# Patient Record
Sex: MALE | Race: WHITE | NOT HISPANIC OR LATINO | Employment: UNEMPLOYED | RURAL
[De-identification: names, ages, dates, MRNs, and addresses within clinical notes are randomized per-mention and may not be internally consistent; named-entity substitution may affect disease eponyms.]

---

## 2018-12-04 ENCOUNTER — HISTORICAL (OUTPATIENT)
Dept: ADMINISTRATIVE | Facility: HOSPITAL | Age: 58
End: 2018-12-04

## 2018-12-06 LAB
LAB AP CLINICAL INFORMATION: NORMAL
LAB AP COMMENTS: NORMAL
LAB AP DIAGNOSIS - HISTORICAL: NORMAL
LAB AP GROSS PATHOLOGY - HISTORICAL: NORMAL
LAB AP SPECIMEN SUBMITTED - HISTORICAL: NORMAL

## 2021-04-30 DIAGNOSIS — Z12.11 COLON CANCER SCREENING: Primary | ICD-10-CM

## 2021-05-17 ENCOUNTER — ANESTHESIA EVENT (OUTPATIENT)
Dept: GASTROENTEROLOGY | Facility: HOSPITAL | Age: 61
End: 2021-05-17
Payer: COMMERCIAL

## 2021-05-17 ENCOUNTER — ANESTHESIA (OUTPATIENT)
Dept: GASTROENTEROLOGY | Facility: HOSPITAL | Age: 61
End: 2021-05-17
Payer: COMMERCIAL

## 2021-05-17 ENCOUNTER — HOSPITAL ENCOUNTER (OUTPATIENT)
Dept: GASTROENTEROLOGY | Facility: HOSPITAL | Age: 61
Discharge: HOME | End: 2021-05-17
Attending: INTERNAL MEDICINE
Payer: COMMERCIAL

## 2021-05-17 VITALS
SYSTOLIC BLOOD PRESSURE: 142 MMHG | RESPIRATION RATE: 14 BRPM | TEMPERATURE: 98 F | DIASTOLIC BLOOD PRESSURE: 78 MMHG | OXYGEN SATURATION: 96 % | HEART RATE: 94 BPM

## 2021-05-17 DIAGNOSIS — D12.4 ADENOMATOUS POLYP OF DESCENDING COLON: ICD-10-CM

## 2021-05-17 DIAGNOSIS — K57.30 DIVERTICULA, COLON: ICD-10-CM

## 2021-05-17 DIAGNOSIS — Z12.11 SCREENING FOR MALIGNANT NEOPLASM OF COLON: ICD-10-CM

## 2021-05-17 DIAGNOSIS — Z12.11 COLON CANCER SCREENING: ICD-10-CM

## 2021-05-17 PROCEDURE — 27201423 OPTIME MED/SURG SUP & DEVICES STERILE SUPPLY

## 2021-05-17 PROCEDURE — D9220A PRA ANESTHESIA: ICD-10-PCS | Mod: PT,,, | Performed by: NURSE ANESTHETIST, CERTIFIED REGISTERED

## 2021-05-17 PROCEDURE — D9220A PRA ANESTHESIA: Mod: PT,,, | Performed by: NURSE ANESTHETIST, CERTIFIED REGISTERED

## 2021-05-17 PROCEDURE — 88305 TISSUE EXAM BY PATHOLOGIST: CPT | Mod: 26,,, | Performed by: PATHOLOGY

## 2021-05-17 PROCEDURE — 63600175 PHARM REV CODE 636 W HCPCS: Performed by: NURSE ANESTHETIST, CERTIFIED REGISTERED

## 2021-05-17 PROCEDURE — 45385 COLONOSCOPY W/LESION REMOVAL: CPT | Mod: PT

## 2021-05-17 PROCEDURE — 37000008 HC ANESTHESIA 1ST 15 MINUTES

## 2021-05-17 PROCEDURE — 25000003 PHARM REV CODE 250: Performed by: NURSE ANESTHETIST, CERTIFIED REGISTERED

## 2021-05-17 PROCEDURE — 88305 TISSUE EXAM BY PATHOLOGIST: CPT | Mod: SUR | Performed by: INTERNAL MEDICINE

## 2021-05-17 PROCEDURE — C1889 IMPLANT/INSERT DEVICE, NOC: HCPCS

## 2021-05-17 PROCEDURE — 88305 SURGICAL PATHOLOGY: ICD-10-PCS | Mod: 26,,, | Performed by: PATHOLOGY

## 2021-05-17 RX ORDER — LIDOCAINE HYDROCHLORIDE 20 MG/ML
INJECTION, SOLUTION EPIDURAL; INFILTRATION; INTRACAUDAL; PERINEURAL
Status: DISCONTINUED | OUTPATIENT
Start: 2021-05-17 | End: 2021-05-17

## 2021-05-17 RX ORDER — SODIUM CHLORIDE 0.9 % (FLUSH) 0.9 %
10 SYRINGE (ML) INJECTION
Status: DISCONTINUED | OUTPATIENT
Start: 2021-05-17 | End: 2021-05-18 | Stop reason: HOSPADM

## 2021-05-17 RX ORDER — PROPOFOL 10 MG/ML
INJECTION, EMULSION INTRAVENOUS
Status: DISCONTINUED | OUTPATIENT
Start: 2021-05-17 | End: 2021-05-17

## 2021-05-17 RX ORDER — ATORVASTATIN CALCIUM 10 MG/1
10 TABLET, FILM COATED ORAL DAILY
COMMUNITY
End: 2021-12-03

## 2021-05-17 RX ADMIN — PROPOFOL 30 MG: 10 INJECTION, EMULSION INTRAVENOUS at 02:05

## 2021-05-17 RX ADMIN — LIDOCAINE HYDROCHLORIDE 100 MG: 20 INJECTION, SOLUTION INTRAVENOUS at 02:05

## 2021-05-17 RX ADMIN — PROPOFOL 70 MG: 10 INJECTION, EMULSION INTRAVENOUS at 02:05

## 2021-05-17 RX ADMIN — SODIUM CHLORIDE: 9 INJECTION, SOLUTION INTRAVENOUS at 02:05

## 2021-05-17 RX ADMIN — PROPOFOL 40 MG: 10 INJECTION, EMULSION INTRAVENOUS at 02:05

## 2021-05-18 LAB
ESTROGEN SERPL-MCNC: NORMAL PG/ML
LAB AP GROSS DESCRIPTION: NORMAL
LAB AP LABORATORY NOTES: NORMAL
T3RU NFR SERPL: NORMAL %

## 2021-05-19 ENCOUNTER — TELEPHONE (OUTPATIENT)
Dept: GASTROENTEROLOGY | Facility: CLINIC | Age: 61
End: 2021-05-19

## 2021-06-10 ENCOUNTER — OFFICE VISIT (OUTPATIENT)
Dept: FAMILY MEDICINE | Facility: CLINIC | Age: 61
End: 2021-06-10
Payer: COMMERCIAL

## 2021-06-10 DIAGNOSIS — I10 HYPERTENSION, UNSPECIFIED TYPE: Primary | ICD-10-CM

## 2021-06-10 DIAGNOSIS — F41.9 ANXIETY: ICD-10-CM

## 2021-06-10 DIAGNOSIS — K21.9 GASTROESOPHAGEAL REFLUX DISEASE, UNSPECIFIED WHETHER ESOPHAGITIS PRESENT: ICD-10-CM

## 2021-06-10 DIAGNOSIS — R52 PAIN: ICD-10-CM

## 2021-06-10 DIAGNOSIS — E78.5 HYPERLIPIDEMIA, UNSPECIFIED HYPERLIPIDEMIA TYPE: ICD-10-CM

## 2021-06-10 LAB
ALBUMIN SERPL BCP-MCNC: 3.6 G/DL (ref 3.5–5)
ALBUMIN/GLOB SERPL: 0.8 {RATIO}
ALP SERPL-CCNC: 126 U/L (ref 45–115)
ALT SERPL W P-5'-P-CCNC: 55 U/L (ref 16–61)
ANION GAP SERPL CALCULATED.3IONS-SCNC: 9 MMOL/L (ref 7–16)
AST SERPL W P-5'-P-CCNC: 27 U/L (ref 15–37)
BACTERIA #/AREA URNS HPF: ABNORMAL /HPF
BILIRUB SERPL-MCNC: 0.2 MG/DL (ref 0–1.2)
BILIRUB UR QL STRIP: NEGATIVE
BUN SERPL-MCNC: 9 MG/DL (ref 7–18)
BUN/CREAT SERPL: 9 (ref 6–20)
CALCIUM SERPL-MCNC: 8.7 MG/DL (ref 8.5–10.1)
CHLORIDE SERPL-SCNC: 106 MMOL/L (ref 98–107)
CHOLEST SERPL-MCNC: 169 MG/DL (ref 0–200)
CHOLEST/HDLC SERPL: 4.6 {RATIO}
CLARITY UR: CLEAR
CO2 SERPL-SCNC: 27 MMOL/L (ref 21–32)
COLOR UR: YELLOW
CREAT SERPL-MCNC: 1.03 MG/DL (ref 0.7–1.3)
CREAT UR-MCNC: 169 MG/DL (ref 39–259)
GLOBULIN SER-MCNC: 4.5 G/DL (ref 2–4)
GLUCOSE SERPL-MCNC: 91 MG/DL (ref 74–106)
GLUCOSE UR STRIP-MCNC: NEGATIVE MG/DL
HDLC SERPL-MCNC: 37 MG/DL (ref 40–60)
HYALINE CASTS #/AREA URNS LPF: ABNORMAL /LPF
KETONES UR STRIP-SCNC: NEGATIVE MG/DL
LDLC SERPL CALC-MCNC: 96 MG/DL
LDLC/HDLC SERPL: 2.6 {RATIO}
LEUKOCYTE ESTERASE UR QL STRIP: ABNORMAL
MICROALBUMIN UR-MCNC: 2 MG/DL (ref 0–2.8)
MICROALBUMIN/CREAT RATIO PNL UR: 11.8 MG/G (ref 0–30)
MUCOUS THREADS #/AREA URNS HPF: ABNORMAL /HPF
NITRITE UR QL STRIP: NEGATIVE
NONHDLC SERPL-MCNC: 132 MG/DL
PH UR STRIP: 6 PH UNITS
POTASSIUM SERPL-SCNC: 4 MMOL/L (ref 3.5–5.1)
PROT SERPL-MCNC: 8.1 G/DL (ref 6.4–8.2)
PROT UR QL STRIP: NEGATIVE
RBC # UR STRIP: NEGATIVE /UL
RBC #/AREA URNS HPF: ABNORMAL /HPF
SODIUM SERPL-SCNC: 138 MMOL/L (ref 136–145)
SP GR UR STRIP: 1.01
TRIGL SERPL-MCNC: 182 MG/DL (ref 35–150)
UROBILINOGEN UR STRIP-ACNC: 0.2 MG/DL
VLDLC SERPL-MCNC: 36 MG/DL
WBC #/AREA URNS HPF: ABNORMAL /HPF

## 2021-06-10 PROCEDURE — 82043 MICROALBUMIN / CREATININE RATIO URINE: ICD-10-PCS | Mod: ,,, | Performed by: CLINICAL MEDICAL LABORATORY

## 2021-06-10 PROCEDURE — 82570 MICROALBUMIN / CREATININE RATIO URINE: ICD-10-PCS | Mod: ,,, | Performed by: CLINICAL MEDICAL LABORATORY

## 2021-06-10 PROCEDURE — 82043 UR ALBUMIN QUANTITATIVE: CPT | Mod: ,,, | Performed by: CLINICAL MEDICAL LABORATORY

## 2021-06-10 PROCEDURE — 99214 OFFICE O/P EST MOD 30 MIN: CPT | Mod: ,,, | Performed by: NURSE PRACTITIONER

## 2021-06-10 PROCEDURE — 81001 URINALYSIS AUTO W/SCOPE: CPT | Mod: ,,, | Performed by: CLINICAL MEDICAL LABORATORY

## 2021-06-10 PROCEDURE — 80061 LIPID PANEL: ICD-10-PCS | Mod: ,,, | Performed by: CLINICAL MEDICAL LABORATORY

## 2021-06-10 PROCEDURE — 80061 LIPID PANEL: CPT | Mod: ,,, | Performed by: CLINICAL MEDICAL LABORATORY

## 2021-06-10 PROCEDURE — 82570 ASSAY OF URINE CREATININE: CPT | Mod: ,,, | Performed by: CLINICAL MEDICAL LABORATORY

## 2021-06-10 PROCEDURE — 99214 PR OFFICE/OUTPT VISIT, EST, LEVL IV, 30-39 MIN: ICD-10-PCS | Mod: ,,, | Performed by: NURSE PRACTITIONER

## 2021-06-10 PROCEDURE — 80053 COMPREHEN METABOLIC PANEL: CPT | Mod: ,,, | Performed by: CLINICAL MEDICAL LABORATORY

## 2021-06-10 PROCEDURE — 81001 URINALYSIS, REFLEX TO URINE CULTURE: ICD-10-PCS | Mod: ,,, | Performed by: CLINICAL MEDICAL LABORATORY

## 2021-06-10 PROCEDURE — 80053 COMPREHENSIVE METABOLIC PANEL: ICD-10-PCS | Mod: ,,, | Performed by: CLINICAL MEDICAL LABORATORY

## 2021-06-10 RX ORDER — NAPROXEN 500 MG/1
500 TABLET ORAL 2 TIMES DAILY
Qty: 90 TABLET | Refills: 1 | Status: SHIPPED | OUTPATIENT
Start: 2021-06-10 | End: 2021-12-15

## 2021-06-10 RX ORDER — OMEPRAZOLE 20 MG/1
20 CAPSULE, DELAYED RELEASE ORAL DAILY
Qty: 90 CAPSULE | Refills: 1 | Status: SHIPPED | OUTPATIENT
Start: 2021-06-10 | End: 2021-12-03 | Stop reason: SDUPTHER

## 2021-06-10 RX ORDER — BUSPIRONE HYDROCHLORIDE 10 MG/1
1 TABLET ORAL 2 TIMES DAILY
COMMUNITY
Start: 2021-03-10 | End: 2021-06-10 | Stop reason: SDUPTHER

## 2021-06-10 RX ORDER — ATORVASTATIN CALCIUM 40 MG/1
40 TABLET, FILM COATED ORAL DAILY
Qty: 90 TABLET | Refills: 1 | Status: SHIPPED | OUTPATIENT
Start: 2021-06-10 | End: 2021-12-03 | Stop reason: SDUPTHER

## 2021-06-10 RX ORDER — ATORVASTATIN CALCIUM 40 MG/1
1 TABLET, FILM COATED ORAL DAILY
COMMUNITY
Start: 2021-03-10 | End: 2021-06-10 | Stop reason: SDUPTHER

## 2021-06-10 RX ORDER — NAPROXEN 500 MG/1
1 TABLET ORAL 2 TIMES DAILY
COMMUNITY
Start: 2021-03-10 | End: 2021-06-10 | Stop reason: SDUPTHER

## 2021-06-10 RX ORDER — LISINOPRIL 10 MG/1
10 TABLET ORAL DAILY
Qty: 90 TABLET | Refills: 1 | Status: SHIPPED | OUTPATIENT
Start: 2021-06-10 | End: 2021-12-03 | Stop reason: SDUPTHER

## 2021-06-10 RX ORDER — LISINOPRIL 10 MG/1
1 TABLET ORAL DAILY
COMMUNITY
Start: 2021-03-10 | End: 2021-06-10 | Stop reason: SDUPTHER

## 2021-06-10 RX ORDER — BUSPIRONE HYDROCHLORIDE 10 MG/1
10 TABLET ORAL 2 TIMES DAILY
Qty: 180 TABLET | Refills: 1 | Status: SHIPPED | OUTPATIENT
Start: 2021-06-10 | End: 2021-12-03 | Stop reason: SDUPTHER

## 2021-06-10 RX ORDER — OMEPRAZOLE 20 MG/1
1 CAPSULE, DELAYED RELEASE ORAL DAILY
COMMUNITY
Start: 2021-03-10 | End: 2021-06-10 | Stop reason: SDUPTHER

## 2021-12-03 ENCOUNTER — OFFICE VISIT (OUTPATIENT)
Dept: FAMILY MEDICINE | Facility: CLINIC | Age: 61
End: 2021-12-03
Payer: COMMERCIAL

## 2021-12-03 VITALS
WEIGHT: 156 LBS | HEART RATE: 79 BPM | DIASTOLIC BLOOD PRESSURE: 80 MMHG | HEIGHT: 65 IN | RESPIRATION RATE: 18 BRPM | BODY MASS INDEX: 25.99 KG/M2 | TEMPERATURE: 99 F | OXYGEN SATURATION: 98 % | SYSTOLIC BLOOD PRESSURE: 130 MMHG

## 2021-12-03 DIAGNOSIS — E78.5 HYPERLIPIDEMIA, UNSPECIFIED HYPERLIPIDEMIA TYPE: ICD-10-CM

## 2021-12-03 DIAGNOSIS — I10 HYPERTENSION, UNSPECIFIED TYPE: ICD-10-CM

## 2021-12-03 DIAGNOSIS — F41.9 ANXIETY: ICD-10-CM

## 2021-12-03 DIAGNOSIS — K21.9 GASTROESOPHAGEAL REFLUX DISEASE, UNSPECIFIED WHETHER ESOPHAGITIS PRESENT: ICD-10-CM

## 2021-12-03 LAB
ALBUMIN SERPL BCP-MCNC: 3.4 G/DL (ref 3.5–5)
ALBUMIN/GLOB SERPL: 0.7 {RATIO}
ALP SERPL-CCNC: 144 U/L (ref 45–115)
ALT SERPL W P-5'-P-CCNC: 51 U/L (ref 16–61)
ANION GAP SERPL CALCULATED.3IONS-SCNC: 6 MMOL/L (ref 7–16)
AST SERPL W P-5'-P-CCNC: 25 U/L (ref 15–37)
BASOPHILS # BLD AUTO: 0.05 K/UL (ref 0–0.2)
BASOPHILS NFR BLD AUTO: 0.5 % (ref 0–1)
BILIRUB SERPL-MCNC: 0.3 MG/DL (ref 0–1.2)
BUN SERPL-MCNC: 9 MG/DL (ref 7–18)
BUN/CREAT SERPL: 9 (ref 6–20)
CALCIUM SERPL-MCNC: 8.8 MG/DL (ref 8.5–10.1)
CHLORIDE SERPL-SCNC: 108 MMOL/L (ref 98–107)
CHOLEST SERPL-MCNC: 126 MG/DL (ref 0–200)
CHOLEST/HDLC SERPL: 3.3 {RATIO}
CO2 SERPL-SCNC: 29 MMOL/L (ref 21–32)
CREAT SERPL-MCNC: 0.95 MG/DL (ref 0.7–1.3)
DIFFERENTIAL METHOD BLD: ABNORMAL
EOSINOPHIL # BLD AUTO: 0.28 K/UL (ref 0–0.5)
EOSINOPHIL NFR BLD AUTO: 2.9 % (ref 1–4)
ERYTHROCYTE [DISTWIDTH] IN BLOOD BY AUTOMATED COUNT: 12.1 % (ref 11.5–14.5)
GLOBULIN SER-MCNC: 4.7 G/DL (ref 2–4)
GLUCOSE SERPL-MCNC: 88 MG/DL (ref 74–106)
HCT VFR BLD AUTO: 43 % (ref 40–54)
HDLC SERPL-MCNC: 38 MG/DL (ref 40–60)
HGB BLD-MCNC: 13.9 G/DL (ref 13.5–18)
IMM GRANULOCYTES # BLD AUTO: 0.02 K/UL (ref 0–0.04)
IMM GRANULOCYTES NFR BLD: 0.2 % (ref 0–0.4)
LDLC SERPL CALC-MCNC: 62 MG/DL
LDLC/HDLC SERPL: 1.6 {RATIO}
LYMPHOCYTES # BLD AUTO: 1.74 K/UL (ref 1–4.8)
LYMPHOCYTES NFR BLD AUTO: 18.3 % (ref 27–41)
MCH RBC QN AUTO: 26.8 PG (ref 27–31)
MCHC RBC AUTO-ENTMCNC: 32.3 G/DL (ref 32–36)
MCV RBC AUTO: 82.9 FL (ref 80–96)
MONOCYTES # BLD AUTO: 0.91 K/UL (ref 0–0.8)
MONOCYTES NFR BLD AUTO: 9.6 % (ref 2–6)
MPC BLD CALC-MCNC: 11.2 FL (ref 9.4–12.4)
NEUTROPHILS # BLD AUTO: 6.52 K/UL (ref 1.8–7.7)
NEUTROPHILS NFR BLD AUTO: 68.5 % (ref 53–65)
NONHDLC SERPL-MCNC: 88 MG/DL
NRBC # BLD AUTO: 0 X10E3/UL
NRBC, AUTO (.00): 0 %
PLATELET # BLD AUTO: 264 K/UL (ref 150–400)
POTASSIUM SERPL-SCNC: 3.6 MMOL/L (ref 3.5–5.1)
PROT SERPL-MCNC: 8.1 G/DL (ref 6.4–8.2)
RBC # BLD AUTO: 5.19 M/UL (ref 4.6–6.2)
SODIUM SERPL-SCNC: 139 MMOL/L (ref 136–145)
TRIGL SERPL-MCNC: 132 MG/DL (ref 35–150)
VLDLC SERPL-MCNC: 26 MG/DL
WBC # BLD AUTO: 9.52 K/UL (ref 4.5–11)

## 2021-12-03 PROCEDURE — 80053 COMPREHENSIVE METABOLIC PANEL: ICD-10-PCS | Mod: ,,, | Performed by: CLINICAL MEDICAL LABORATORY

## 2021-12-03 PROCEDURE — 4010F PR ACE/ARB THEARPY RXD/TAKEN: ICD-10-PCS | Mod: ,,, | Performed by: NURSE PRACTITIONER

## 2021-12-03 PROCEDURE — 80061 LIPID PANEL: CPT | Mod: ,,, | Performed by: CLINICAL MEDICAL LABORATORY

## 2021-12-03 PROCEDURE — 99214 PR OFFICE/OUTPT VISIT, EST, LEVL IV, 30-39 MIN: ICD-10-PCS | Mod: ,,, | Performed by: NURSE PRACTITIONER

## 2021-12-03 PROCEDURE — 3066F PR DOCUMENTATION OF TREATMENT FOR NEPHROPATHY: ICD-10-PCS | Mod: ,,, | Performed by: NURSE PRACTITIONER

## 2021-12-03 PROCEDURE — 3066F NEPHROPATHY DOC TX: CPT | Mod: ,,, | Performed by: NURSE PRACTITIONER

## 2021-12-03 PROCEDURE — 3061F NEG MICROALBUMINURIA REV: CPT | Mod: ,,, | Performed by: NURSE PRACTITIONER

## 2021-12-03 PROCEDURE — 80061 LIPID PANEL: ICD-10-PCS | Mod: ,,, | Performed by: CLINICAL MEDICAL LABORATORY

## 2021-12-03 PROCEDURE — 85025 CBC WITH DIFFERENTIAL: ICD-10-PCS | Mod: ,,, | Performed by: CLINICAL MEDICAL LABORATORY

## 2021-12-03 PROCEDURE — 99214 OFFICE O/P EST MOD 30 MIN: CPT | Mod: ,,, | Performed by: NURSE PRACTITIONER

## 2021-12-03 PROCEDURE — 3061F PR NEG MICROALBUMINURIA RESULT DOCUMENTED/REVIEW: ICD-10-PCS | Mod: ,,, | Performed by: NURSE PRACTITIONER

## 2021-12-03 PROCEDURE — 80053 COMPREHEN METABOLIC PANEL: CPT | Mod: ,,, | Performed by: CLINICAL MEDICAL LABORATORY

## 2021-12-03 PROCEDURE — 85025 COMPLETE CBC W/AUTO DIFF WBC: CPT | Mod: ,,, | Performed by: CLINICAL MEDICAL LABORATORY

## 2021-12-03 PROCEDURE — 4010F ACE/ARB THERAPY RXD/TAKEN: CPT | Mod: ,,, | Performed by: NURSE PRACTITIONER

## 2021-12-03 RX ORDER — OMEPRAZOLE 20 MG/1
20 CAPSULE, DELAYED RELEASE ORAL DAILY
Qty: 90 CAPSULE | Refills: 1 | Status: SHIPPED | OUTPATIENT
Start: 2021-12-03 | End: 2022-12-08 | Stop reason: SDUPTHER

## 2021-12-03 RX ORDER — ATORVASTATIN CALCIUM 40 MG/1
40 TABLET, FILM COATED ORAL DAILY
Qty: 90 TABLET | Refills: 1 | Status: SHIPPED | OUTPATIENT
Start: 2021-12-03 | End: 2022-06-02 | Stop reason: SDUPTHER

## 2021-12-03 RX ORDER — BUSPIRONE HYDROCHLORIDE 10 MG/1
10 TABLET ORAL 2 TIMES DAILY
Qty: 180 TABLET | Refills: 1 | Status: SHIPPED | OUTPATIENT
Start: 2021-12-03 | End: 2022-06-02 | Stop reason: SDUPTHER

## 2021-12-03 RX ORDER — LISINOPRIL 10 MG/1
10 TABLET ORAL DAILY
Qty: 90 TABLET | Refills: 1 | Status: SHIPPED | OUTPATIENT
Start: 2021-12-03 | End: 2022-06-02 | Stop reason: SDUPTHER

## 2021-12-15 ENCOUNTER — OFFICE VISIT (OUTPATIENT)
Dept: FAMILY MEDICINE | Facility: CLINIC | Age: 61
End: 2021-12-15
Payer: COMMERCIAL

## 2021-12-15 VITALS
RESPIRATION RATE: 16 BRPM | HEART RATE: 77 BPM | HEIGHT: 66 IN | WEIGHT: 157.19 LBS | OXYGEN SATURATION: 97 % | SYSTOLIC BLOOD PRESSURE: 150 MMHG | TEMPERATURE: 97 F | BODY MASS INDEX: 25.26 KG/M2 | DIASTOLIC BLOOD PRESSURE: 70 MMHG

## 2021-12-15 DIAGNOSIS — H65.01 RIGHT ACUTE SEROUS OTITIS MEDIA, RECURRENCE NOT SPECIFIED: Primary | ICD-10-CM

## 2021-12-15 PROCEDURE — 4010F ACE/ARB THERAPY RXD/TAKEN: CPT | Mod: ,,, | Performed by: NURSE PRACTITIONER

## 2021-12-15 PROCEDURE — 99213 OFFICE O/P EST LOW 20 MIN: CPT | Mod: ,,, | Performed by: NURSE PRACTITIONER

## 2021-12-15 PROCEDURE — 3066F PR DOCUMENTATION OF TREATMENT FOR NEPHROPATHY: ICD-10-PCS | Mod: ,,, | Performed by: NURSE PRACTITIONER

## 2021-12-15 PROCEDURE — 99213 PR OFFICE/OUTPT VISIT, EST, LEVL III, 20-29 MIN: ICD-10-PCS | Mod: ,,, | Performed by: NURSE PRACTITIONER

## 2021-12-15 PROCEDURE — 3066F NEPHROPATHY DOC TX: CPT | Mod: ,,, | Performed by: NURSE PRACTITIONER

## 2021-12-15 PROCEDURE — 3061F PR NEG MICROALBUMINURIA RESULT DOCUMENTED/REVIEW: ICD-10-PCS | Mod: ,,, | Performed by: NURSE PRACTITIONER

## 2021-12-15 PROCEDURE — 3061F NEG MICROALBUMINURIA REV: CPT | Mod: ,,, | Performed by: NURSE PRACTITIONER

## 2021-12-15 PROCEDURE — 4010F PR ACE/ARB THEARPY RXD/TAKEN: ICD-10-PCS | Mod: ,,, | Performed by: NURSE PRACTITIONER

## 2021-12-15 RX ORDER — CEFPROZIL 500 MG/1
TABLET, FILM COATED ORAL
COMMUNITY
Start: 2021-12-11 | End: 2023-01-06

## 2021-12-15 RX ORDER — CIPROFLOXACIN AND DEXAMETHASONE 3; 1 MG/ML; MG/ML
4 SUSPENSION/ DROPS AURICULAR (OTIC) 2 TIMES DAILY
Qty: 7.5 ML | Refills: 0 | Status: SHIPPED | OUTPATIENT
Start: 2021-12-15 | End: 2021-12-16

## 2021-12-21 ENCOUNTER — OFFICE VISIT (OUTPATIENT)
Dept: OTOLARYNGOLOGY | Facility: CLINIC | Age: 61
End: 2021-12-21
Payer: COMMERCIAL

## 2021-12-21 VITALS — WEIGHT: 157 LBS | BODY MASS INDEX: 25.23 KG/M2 | HEIGHT: 66 IN

## 2021-12-21 DIAGNOSIS — H60.313 DIFFUSE OTITIS EXTERNA OF BOTH EARS, UNSPECIFIED CHRONICITY: ICD-10-CM

## 2021-12-21 DIAGNOSIS — G51.0 FACIAL PARALYSIS: ICD-10-CM

## 2021-12-21 DIAGNOSIS — H92.01 OTALGIA OF RIGHT EAR: Primary | ICD-10-CM

## 2021-12-21 PROCEDURE — 4010F PR ACE/ARB THEARPY RXD/TAKEN: ICD-10-PCS | Mod: CPTII,,, | Performed by: OTOLARYNGOLOGY

## 2021-12-21 PROCEDURE — 3066F PR DOCUMENTATION OF TREATMENT FOR NEPHROPATHY: ICD-10-PCS | Mod: CPTII,,, | Performed by: OTOLARYNGOLOGY

## 2021-12-21 PROCEDURE — 99213 OFFICE O/P EST LOW 20 MIN: CPT | Mod: PBBFAC | Performed by: OTOLARYNGOLOGY

## 2021-12-21 PROCEDURE — 4010F ACE/ARB THERAPY RXD/TAKEN: CPT | Mod: CPTII,,, | Performed by: OTOLARYNGOLOGY

## 2021-12-21 PROCEDURE — 99204 PR OFFICE/OUTPT VISIT, NEW, LEVL IV, 45-59 MIN: ICD-10-PCS | Mod: S$PBB,,, | Performed by: OTOLARYNGOLOGY

## 2021-12-21 PROCEDURE — 3061F NEG MICROALBUMINURIA REV: CPT | Mod: CPTII,,, | Performed by: OTOLARYNGOLOGY

## 2021-12-21 PROCEDURE — 3066F NEPHROPATHY DOC TX: CPT | Mod: CPTII,,, | Performed by: OTOLARYNGOLOGY

## 2021-12-21 PROCEDURE — 99204 OFFICE O/P NEW MOD 45 MIN: CPT | Mod: S$PBB,,, | Performed by: OTOLARYNGOLOGY

## 2021-12-21 PROCEDURE — 3061F PR NEG MICROALBUMINURIA RESULT DOCUMENTED/REVIEW: ICD-10-PCS | Mod: CPTII,,, | Performed by: OTOLARYNGOLOGY

## 2021-12-21 RX ORDER — NEOMYCIN SULFATE, POLYMYXIN B SULFATE AND HYDROCORTISONE 10; 3.5; 1 MG/ML; MG/ML; [USP'U]/ML
3 SUSPENSION/ DROPS AURICULAR (OTIC) 2 TIMES DAILY
Qty: 10 ML | Refills: 0 | Status: SHIPPED | OUTPATIENT
Start: 2021-12-21 | End: 2022-09-11

## 2021-12-21 RX ORDER — AMOXICILLIN AND CLAVULANATE POTASSIUM 500; 125 MG/1; MG/1
1 TABLET, FILM COATED ORAL 2 TIMES DAILY
Qty: 28 TABLET | Refills: 0 | Status: SHIPPED | OUTPATIENT
Start: 2021-12-21 | End: 2022-09-11

## 2022-04-08 ENCOUNTER — OFFICE VISIT (OUTPATIENT)
Dept: FAMILY MEDICINE | Facility: CLINIC | Age: 62
End: 2022-04-08
Payer: COMMERCIAL

## 2022-04-08 VITALS
WEIGHT: 157 LBS | SYSTOLIC BLOOD PRESSURE: 138 MMHG | TEMPERATURE: 98 F | DIASTOLIC BLOOD PRESSURE: 72 MMHG | HEIGHT: 66 IN | OXYGEN SATURATION: 99 % | HEART RATE: 82 BPM | BODY MASS INDEX: 25.23 KG/M2 | RESPIRATION RATE: 20 BRPM

## 2022-04-08 DIAGNOSIS — Z00.00 ENCOUNTER FOR GENERAL ADULT MEDICAL EXAMINATION W/O ABNORMAL FINDINGS: Primary | ICD-10-CM

## 2022-04-08 DIAGNOSIS — Z12.5 ENCOUNTER FOR SCREENING FOR MALIGNANT NEOPLASM OF PROSTATE: ICD-10-CM

## 2022-04-08 DIAGNOSIS — E78.5 HYPERLIPIDEMIA, UNSPECIFIED HYPERLIPIDEMIA TYPE: ICD-10-CM

## 2022-04-08 DIAGNOSIS — I10 HYPERTENSION, UNSPECIFIED TYPE: ICD-10-CM

## 2022-04-08 DIAGNOSIS — Z11.59 ENCOUNTER FOR HEPATITIS C SCREENING TEST FOR LOW RISK PATIENT: ICD-10-CM

## 2022-04-08 PROCEDURE — 1159F MED LIST DOCD IN RCRD: CPT | Mod: ,,, | Performed by: NURSE PRACTITIONER

## 2022-04-08 PROCEDURE — 3008F BODY MASS INDEX DOCD: CPT | Mod: ,,, | Performed by: NURSE PRACTITIONER

## 2022-04-08 PROCEDURE — 4010F PR ACE/ARB THEARPY RXD/TAKEN: ICD-10-PCS | Mod: ,,, | Performed by: NURSE PRACTITIONER

## 2022-04-08 PROCEDURE — 3075F PR MOST RECENT SYSTOLIC BLOOD PRESS GE 130-139MM HG: ICD-10-PCS | Mod: ,,, | Performed by: NURSE PRACTITIONER

## 2022-04-08 PROCEDURE — G0439 PPPS, SUBSEQ VISIT: HCPCS | Mod: ,,, | Performed by: NURSE PRACTITIONER

## 2022-04-08 PROCEDURE — 1159F PR MEDICATION LIST DOCUMENTED IN MEDICAL RECORD: ICD-10-PCS | Mod: ,,, | Performed by: NURSE PRACTITIONER

## 2022-04-08 PROCEDURE — G0439 PR MEDICARE ANNUAL WELLNESS SUBSEQUENT VISIT: ICD-10-PCS | Mod: ,,, | Performed by: NURSE PRACTITIONER

## 2022-04-08 PROCEDURE — 1160F RVW MEDS BY RX/DR IN RCRD: CPT | Mod: ,,, | Performed by: NURSE PRACTITIONER

## 2022-04-08 PROCEDURE — 1160F PR REVIEW ALL MEDS BY PRESCRIBER/CLIN PHARMACIST DOCUMENTED: ICD-10-PCS | Mod: ,,, | Performed by: NURSE PRACTITIONER

## 2022-04-08 PROCEDURE — 3078F PR MOST RECENT DIASTOLIC BLOOD PRESSURE < 80 MM HG: ICD-10-PCS | Mod: ,,, | Performed by: NURSE PRACTITIONER

## 2022-04-08 PROCEDURE — 3078F DIAST BP <80 MM HG: CPT | Mod: ,,, | Performed by: NURSE PRACTITIONER

## 2022-04-08 PROCEDURE — 4010F ACE/ARB THERAPY RXD/TAKEN: CPT | Mod: ,,, | Performed by: NURSE PRACTITIONER

## 2022-04-08 PROCEDURE — 3075F SYST BP GE 130 - 139MM HG: CPT | Mod: ,,, | Performed by: NURSE PRACTITIONER

## 2022-04-08 PROCEDURE — 3008F PR BODY MASS INDEX (BMI) DOCUMENTED: ICD-10-PCS | Mod: ,,, | Performed by: NURSE PRACTITIONER

## 2022-04-08 NOTE — PATIENT INSTRUCTIONS
Counseling and Referral of Other Preventative  (Italic type indicates deductible and co-insurance are waived)    Patient Name: Amador Callahan  Today's Date: 4/8/2022    Health Maintenance         Date Due Completion Date    Hepatitis C Screening Never done ---    Influenza Vaccine (1) 06/30/2022 (Originally 9/1/2021) ---    TETANUS VACCINE 04/08/2023 (Originally 12/11/1976) ---    Shingles Vaccine (1 of 2) 04/08/2023 (Originally 12/11/2008) ---    HIV Screening 12/15/2027 (Originally 12/11/1973) ---    Lipid Panel 12/03/2022 12/3/2021    Colorectal Cancer Screening 05/17/2026 5/17/2021          No orders of the defined types were placed in this encounter.

## 2022-04-08 NOTE — PROGRESS NOTES
[facility] AWV   [facility]     PATIENT NAME: Amador Callahan   : 1960    AGE: 61 y.o. DATE: 2022   MRN: [id number]        Reason for Visit / Chief Complaint: Medicare AWV ([employer] Subs AWV )        Amador Callahan presents for a Subsequent Medicare AWV today.     The following components were reviewed and updated:    Medical/Social/Family History:  Past Medical History:   Diagnosis Date    Adenomatous polyp of descending colon 2021    Anxiety     Diverticula, colon 2021    GERD (gastroesophageal reflux disease)     History of cerebrovascular accident     Hyperlipidemia     Hypertension     Screening for malignant neoplasm of colon 2021        Family History   Problem Relation Age of Onset    Heart disease Mother     Cancer Father     Cancer Sister         Social History     Tobacco Use   Smoking Status Former Smoker    Packs/day: 1.00    Years: 2.00    Pack years: 2.00    Types: Cigarettes    Start date:     Quit date:     Years since quittin.2   Smokeless Tobacco Never Used      Social History     Substance and Sexual Activity   Alcohol Use Never       Family History   Problem Relation Age of Onset    Heart disease Mother     Cancer Father     Cancer Sister        Past Surgical History:   Procedure Laterality Date    heart cath           · Allergies and Current Medications   Review of patient's allergies indicates:  No Known Allergies    Current Outpatient Medications:     atorvastatin (LIPITOR) 40 MG tablet, Take 1 tablet (40 mg total) by mouth once daily., Disp: 90 tablet, Rfl: 1    busPIRone (BUSPAR) 10 MG tablet, Take 1 tablet (10 mg total) by mouth 2 (two) times a day., Disp: 180 tablet, Rfl: 1    lisinopriL 10 MG tablet, Take 1 tablet (10 mg total) by mouth once daily., Disp: 90 tablet, Rfl: 1    omeprazole (PRILOSEC) 20 MG capsule, Take 1 capsule (20 mg total) by mouth once daily., Disp: 90 capsule, Rfl: 1     amoxicillin-clavulanate 500-125mg (AUGMENTIN) 500-125 mg Tab, Take 1 tablet (500 mg total) by mouth 2 (two) times daily. (Patient not taking: Reported on 4/8/2022), Disp: 28 tablet, Rfl: 0    cefPROZIL (CEFZIL) 500 MG tablet, , Disp: , Rfl:     ciprofloxacin-hydrocortisone 0.2-1% (CIPRO HC OTIC) otic suspension, Place 3 drops into the right ear every 12 (twelve) hours. (Patient not taking: Reported on 4/8/2022), Disp: 10 mL, Rfl: 0    neomycin-polymyxin-hydrocortisone (CORTISPORIN) 3.5-10,000-1 mg/mL-unit/mL-% otic suspension, Place 3 drops into the right ear 2 (two) times a day. (Patient not taking: Reported on 4/8/2022), Disp: 10 mL, Rfl: 0    · Health Risk Assessment   Fall Risk: No   Obesity: BMI 25.34     Advance Directive:  Does not have an advanced directive. Verbal education and written education included in today's AVS.   Depression: PHQ9 2    HTN:   yes, DASH diet, exercise, weight management, med compliance, home BP monitoring, and follow-up discussed.   T2DM: NA   Tobacco use: Former Smoker. Quit 1980  STI: NA    Alcohol misuse: Denies   Statin Use: Yes      · Health Risk Assessment  What is your age?:  (61)  Are you male or female?: Male  During the past four weeks, how much have you been bothered by emotional problems such as feeling anxious, depressed, irritable, sad, or downhearted and blue?: Quite a bit  During the past five weeks, has your physical and/or emotional health limited your social activities with family, friends, neighbors, or groups?: Not at all  During the past four weeks, how much bodily pain have you generally had?: Mild pain  During the past four weeks, was someone available to help if you needed and wanted help?: Yes, as much as I wanted  During the past four weeks, what was the hardest physical activity you could do for at least two minutes?: Moderate  Can you get to places out of walking distance without help?  (For example, can you travel alone on buses or taxis, or drive  your own car?): No  Can you go shopping for groceries or clothes without someone's help?: Yes  Can you prepare your own meals?: Yes  Can you do your own housework without help?: Yes  Because of any health problems, do you need the help of another person with your personal care needs such as eating, bathing, dressing, or getting around the house?: No  Can you handle your own money without help?: Yes  During the past four weeks, how would you rate your health in general?: Fair  How have things been going for you during the past four weeks?: Pretty well  Are you having difficulties driving your car?: No  Do you always fasten your seat belt when you are in a car?: Yes, usually  How often in the past four weeks have you been bothered by falling or dizzy when standing up?: Sometimes  How often in the past four weeks have you been bothered by sexual problems?: Never  How often in the past four weeks have you been bothered by trouble eating well?: Never  How often in the past four weeks have you been bothered by teeth or denture problems?: Never  How often in the past four weeks have you been bothered with problems using the telephone?: Never  How often in the past four weeks have you been bothered by tiredness or fatigue?: Sometimes  Have you fallen two or more times in the past year?: No  Are you afraid of falling?: No  Are you a smoker?: No  During the past four weeks, how many drinks of wine, beer, or other alcoholic beverages did you have?: No alcohol at all  Do you exercise for about 20 minutes three or more days a week?: Yes, some of the time  Have you been given any information to help you with hazards in your house that might hurt you?: No  Have you been given any information to help you with keeping track of your medications?: No  How often do you have trouble taking medicines the way you've been told to take them?: I always take them as prescribed  How confident are you that you can control and manage most of  your health problems?: Somewhat confident  What is your race? (Check all that apply.):     · Health Maintenance   Last eye exam: 2021   Last CV screen with lipids: 12/03/2021   Diabetes screening with fasting glucose or A1c: 12/03/2021   Colonoscopy: 05/17/2021   Flu Vaccine: Declined   Pneumonia vaccines: Declined   COVID vaccine: Pfizer 02/22/2021 03/16/2021 10/16/2021   Hep B vaccine: NA   DEXA: NA   Last pap/pelvic: NA   Last Mammogram: NA   Last PSA screen: 10/21/2015   AAA screening: NA (once in lifetime for males 65-75 who have smoked > 100 cigarettes in lifetime)  HIV Screeing: NA  Hepatitis C Screen: Eligible  Low Dose CT Scan: NA    Health Maintenance Topics with due status: Not Due       Topic Last Completion Date    Colorectal Cancer Screening 05/17/2021    Lipid Panel 12/03/2021     Health Maintenance Due   Topic Date Due    PROSTATE-SPECIFIC ANTIGEN  Never done    Hepatitis C Screening  Never done        Incontinence  Bowel: No  Bladder: Yes    Lab results available in Epic or see dates from [facility] above:   Lab Results   Component Value Date    CHOL 126 12/03/2021    CHOL 169 06/10/2021     Lab Results   Component Value Date    HDL 38 (L) 12/03/2021    HDL 37 (L) 06/10/2021     Lab Results   Component Value Date    LDLCALC 62 12/03/2021    LDLCALC 96 06/10/2021     Lab Results   Component Value Date    TRIG 132 12/03/2021    TRIG 182 (H) 06/10/2021     Lab Results   Component Value Date    CHOLHDL 3.3 12/03/2021    CHOLHDL 4.6 06/10/2021       No results found for: LABA1C, HGBA1C    Sodium   Date Value Ref Range Status   12/03/2021 139 136 - 145 mmol/L Final     Potassium   Date Value Ref Range Status   12/03/2021 3.6 3.5 - 5.1 mmol/L Final     Chloride   Date Value Ref Range Status   12/03/2021 108 (H) 98 - 107 mmol/L Final     CO2   Date Value Ref Range Status   12/03/2021 29 21 - 32 mmol/L Final     Glucose   Date Value Ref Range Status   12/03/2021 88 74 - 106 mg/dL Final     BUN   Date  "Value Ref Range Status   12/03/2021 9 7 - 18 mg/dL Final     Creatinine   Date Value Ref Range Status   12/03/2021 0.95 0.70 - 1.30 mg/dL Final     Calcium   Date Value Ref Range Status   12/03/2021 8.8 8.5 - 10.1 mg/dL Final     Total Protein   Date Value Ref Range Status   12/03/2021 8.1 6.4 - 8.2 g/dL Final     Albumin   Date Value Ref Range Status   12/03/2021 3.4 (L) 3.5 - 5.0 g/dL Final     Bilirubin, Total   Date Value Ref Range Status   12/03/2021 0.3 0.0 - 1.2 mg/dL Final     Alk Phos   Date Value Ref Range Status   12/03/2021 144 (H) 45 - 115 U/L Final     AST   Date Value Ref Range Status   12/03/2021 25 15 - 37 U/L Final     ALT   Date Value Ref Range Status   12/03/2021 51 16 - 61 U/L Final     Anion Gap   Date Value Ref Range Status   12/03/2021 6 (L) 7 - 16 mmol/L Final     eGFR   Date Value Ref Range Status   12/03/2021 85 >=60 mL/min/1.73m² Final         No results found for: PSA                **See Completed Assessments for Annual Wellness visit within the encounter summary    The following assessments were completed & reviewed:  · Depression Screening  · Cognitive function Screening  · Timed Get Up Test  · Whisper Test  · Vision Screen  · Health Risk Assessment  · Checklist of ADLs and IADLs      Objective  Vitals:    04/08/22 1119   BP: 138/72   Pulse: 82   Resp: 20   Temp: 97.6 °F (36.4 °C)   SpO2: 99%   Weight: 71.2 kg (157 lb)   Height: 5' 6" (1.676 m)   PainSc:   6   PainLoc: Shoulder      Body mass index is 25.34 kg/m².  Ideal body weight: 63.8 kg (140 lb 10.5 oz)       Physical Exam  Vitals and nursing note reviewed.   Constitutional:       General: He is not in acute distress.     Appearance: He is normal weight.   HENT:      Mouth/Throat:      Mouth: Mucous membranes are moist.   Eyes:      Pupils: Pupils are equal, round, and reactive to light.   Cardiovascular:      Rate and Rhythm: Normal rate and regular rhythm.      Pulses: Normal pulses.      Heart sounds: No murmur " heard.  Pulmonary:      Effort: Pulmonary effort is normal. No respiratory distress.      Breath sounds: Normal breath sounds. No wheezing, rhonchi or rales.   Chest:      Chest wall: No tenderness.   Abdominal:      General: Bowel sounds are normal. There is no distension.      Palpations: Abdomen is soft.      Tenderness: There is no abdominal tenderness. There is no right CVA tenderness, left CVA tenderness, guarding or rebound.   Musculoskeletal:         General: No swelling or tenderness. Normal range of motion.      Cervical back: Normal range of motion and neck supple.      Right lower leg: No edema.      Left lower leg: No edema.   Skin:     General: Skin is warm and dry.   Neurological:      General: No focal deficit present.      Mental Status: He is alert and oriented to person, place, and time.   Psychiatric:         Mood and Affect: Mood normal.         Behavior: Behavior normal.         Thought Content: Thought content normal.         Judgment: Judgment normal.           Assessment:     1. Encounter for hepatitis C screening test for low risk patient  - Hepatitis C Antibody; Future    2. Encounter for screening for malignant neoplasm of prostate  - PSA, Screening; Future    3. Encounter for general adult medical examination w/o abnormal findings    4. Hypertension, unspecified type    5. Hyperlipidemia, unspecified hyperlipidemia type    6. Body mass index (BMI) 25.0-25.9, adult         Plan:                Discussed and provided with a screening schedule and personal prevention plan in accordance with USPSTF age appropriate recommendations and Medicare screening guidelines.   Education, counseling, and referrals were provided as needed.  After Visit Summary printed and given to patient which includes written education and a list of any referrals if indicated.     F/u plan for yearly AWV.    Signature: Kaya Brandt reviewing records for Michelle Go NP.   I have reviewed the encounter  and agree with the assessment and plan.   Jason Brandt MD

## 2022-04-27 ENCOUNTER — OFFICE VISIT (OUTPATIENT)
Dept: FAMILY MEDICINE | Facility: CLINIC | Age: 62
End: 2022-04-27
Payer: COMMERCIAL

## 2022-04-27 VITALS
TEMPERATURE: 99 F | HEIGHT: 66 IN | BODY MASS INDEX: 25.88 KG/M2 | DIASTOLIC BLOOD PRESSURE: 99 MMHG | SYSTOLIC BLOOD PRESSURE: 179 MMHG | RESPIRATION RATE: 20 BRPM | WEIGHT: 161 LBS | HEART RATE: 88 BPM | OXYGEN SATURATION: 98 %

## 2022-04-27 DIAGNOSIS — G89.29 CHRONIC RIGHT SHOULDER PAIN: Primary | ICD-10-CM

## 2022-04-27 DIAGNOSIS — F41.9 ANXIETY: Chronic | ICD-10-CM

## 2022-04-27 DIAGNOSIS — K21.9 GASTROESOPHAGEAL REFLUX DISEASE, UNSPECIFIED WHETHER ESOPHAGITIS PRESENT: Chronic | ICD-10-CM

## 2022-04-27 DIAGNOSIS — E78.5 HYPERLIPIDEMIA, UNSPECIFIED HYPERLIPIDEMIA TYPE: Chronic | ICD-10-CM

## 2022-04-27 DIAGNOSIS — M25.511 CHRONIC RIGHT SHOULDER PAIN: Primary | ICD-10-CM

## 2022-04-27 DIAGNOSIS — I10 HYPERTENSION, UNSPECIFIED TYPE: Chronic | ICD-10-CM

## 2022-04-27 DIAGNOSIS — Z86.73 HISTORY OF CEREBROVASCULAR ACCIDENT: Chronic | ICD-10-CM

## 2022-04-27 PROBLEM — D12.4 ADENOMATOUS POLYP OF DESCENDING COLON: Chronic | Status: ACTIVE | Noted: 2021-05-17

## 2022-04-27 PROBLEM — Z12.11 COLON CANCER SCREENING: Chronic | Status: ACTIVE | Noted: 2021-05-17

## 2022-04-27 PROBLEM — K57.30 DIVERTICULA, COLON: Chronic | Status: ACTIVE | Noted: 2021-05-17

## 2022-04-27 PROCEDURE — 3080F DIAST BP >= 90 MM HG: CPT | Mod: ,,, | Performed by: NURSE PRACTITIONER

## 2022-04-27 PROCEDURE — 3008F BODY MASS INDEX DOCD: CPT | Mod: ,,, | Performed by: NURSE PRACTITIONER

## 2022-04-27 PROCEDURE — 99214 PR OFFICE/OUTPT VISIT, EST, LEVL IV, 30-39 MIN: ICD-10-PCS | Mod: 25,,, | Performed by: NURSE PRACTITIONER

## 2022-04-27 PROCEDURE — 1159F MED LIST DOCD IN RCRD: CPT | Mod: ,,, | Performed by: NURSE PRACTITIONER

## 2022-04-27 PROCEDURE — 3077F PR MOST RECENT SYSTOLIC BLOOD PRESSURE >= 140 MM HG: ICD-10-PCS | Mod: ,,, | Performed by: NURSE PRACTITIONER

## 2022-04-27 PROCEDURE — 3080F PR MOST RECENT DIASTOLIC BLOOD PRESSURE >= 90 MM HG: ICD-10-PCS | Mod: ,,, | Performed by: NURSE PRACTITIONER

## 2022-04-27 PROCEDURE — 96372 THER/PROPH/DIAG INJ SC/IM: CPT | Mod: ,,, | Performed by: NURSE PRACTITIONER

## 2022-04-27 PROCEDURE — 1160F RVW MEDS BY RX/DR IN RCRD: CPT | Mod: ,,, | Performed by: NURSE PRACTITIONER

## 2022-04-27 PROCEDURE — 1160F PR REVIEW ALL MEDS BY PRESCRIBER/CLIN PHARMACIST DOCUMENTED: ICD-10-PCS | Mod: ,,, | Performed by: NURSE PRACTITIONER

## 2022-04-27 PROCEDURE — 4010F PR ACE/ARB THEARPY RXD/TAKEN: ICD-10-PCS | Mod: ,,, | Performed by: NURSE PRACTITIONER

## 2022-04-27 PROCEDURE — 1159F PR MEDICATION LIST DOCUMENTED IN MEDICAL RECORD: ICD-10-PCS | Mod: ,,, | Performed by: NURSE PRACTITIONER

## 2022-04-27 PROCEDURE — 4010F ACE/ARB THERAPY RXD/TAKEN: CPT | Mod: ,,, | Performed by: NURSE PRACTITIONER

## 2022-04-27 PROCEDURE — 3008F PR BODY MASS INDEX (BMI) DOCUMENTED: ICD-10-PCS | Mod: ,,, | Performed by: NURSE PRACTITIONER

## 2022-04-27 PROCEDURE — 99214 OFFICE O/P EST MOD 30 MIN: CPT | Mod: 25,,, | Performed by: NURSE PRACTITIONER

## 2022-04-27 PROCEDURE — 96372 PR INJECTION,THERAP/PROPH/DIAG2ST, IM OR SUBCUT: ICD-10-PCS | Mod: ,,, | Performed by: NURSE PRACTITIONER

## 2022-04-27 PROCEDURE — 3077F SYST BP >= 140 MM HG: CPT | Mod: ,,, | Performed by: NURSE PRACTITIONER

## 2022-04-27 RX ORDER — CYCLOBENZAPRINE HCL 5 MG
2.5-5 TABLET ORAL EVERY 8 HOURS PRN
COMMUNITY
Start: 2022-04-24 | End: 2022-05-19 | Stop reason: SDUPTHER

## 2022-04-27 RX ORDER — KETOROLAC TROMETHAMINE 30 MG/ML
30 INJECTION, SOLUTION INTRAMUSCULAR; INTRAVENOUS
Status: COMPLETED | OUTPATIENT
Start: 2022-04-27 | End: 2022-04-27

## 2022-04-27 RX ADMIN — KETOROLAC TROMETHAMINE 30 MG: 30 INJECTION, SOLUTION INTRAMUSCULAR; INTRAVENOUS at 09:04

## 2022-04-27 NOTE — PATIENT INSTRUCTIONS
Physical therapy 3 times a week for 1 month. Heat to affected area. Aleve 2 times a day for 1 week. Take prescribed Flexeril 3 times a day for 1 week, then as needed. Follow up in 1 month.

## 2022-04-27 NOTE — PROGRESS NOTES
Kaya Go DNP, FNP    [address] [place] [address]     PATIENT NAME: Amador Callahan  : 1960  DATE: 22  MRN: [id number]      Billing Provider: Kaya Go DNP, FNP  Level of Service:   Patient PCP Information     Provider PCP Type    Kaya Go DNP, FNP General          Reason for Visit / Chief Complaint: Arm Pain (Complain of right shoulder and arm pain since Friday.  Patient seen [facility] on , was treated with injection, unknown to what kind and flexeril 5 mg. Patient states no relief. Patient states he has had this problem in the past but today is worse.)       Update PCP  Update Chief Complaint         History of Present Illness / Problem Focused Workflow     Amador Callahan presents to the clinic with Arm Pain (Complain of right shoulder and arm pain since Friday.  Patient seen [facility] on , was treated with injection, unknown to what kind and flexeril 5 mg. Patient states no relief. Patient states he has had this problem in the past but today is worse.)     Pt c/o right shoulder and upper back pain x approx 1 week. Pt noticed after doing yard work last week. Pt went to [facility] and was prescribed muscle relaxers. Pt has not been taking as prescribed. Pt has taken ibuprofen a few times.     Pt has hx right shoulder pain after weed eating several months ago.       Review of Systems     Review of Systems   Constitutional: Negative for activity change and appetite change.   HENT: Negative for dental problem, ear pain, sinus pressure/congestion and sore throat.    Respiratory: Negative for cough, chest tightness and shortness of breath.    Cardiovascular: Negative for chest pain and palpitations.   Gastrointestinal: Negative for abdominal pain.   Genitourinary: Negative for bladder incontinence and dysuria.   Musculoskeletal: Positive for arthralgias and myalgias.        Right shoulder/arm pain.   Integumentary:  Negative for  rash.   Neurological: Positive for facial asymmetry (chronic). Negative for dizziness, weakness and numbness.        Medical / Social / Family History     Past Medical History:   Diagnosis Date    Adenomatous polyp of descending colon 5/17/2021    Anxiety     Diverticula, colon 5/17/2021    GERD (gastroesophageal reflux disease)     History of cerebrovascular accident     Hyperlipidemia     Hypertension     Screening for malignant neoplasm of colon 5/17/2021       Past Surgical History:   Procedure Laterality Date    heart cath         Social History  Mr. Amador Callahan  reports that he quit smoking about 42 years ago. His smoking use included cigarettes. He started smoking about 44 years ago. He has a 2.00 pack-year smoking history. He has never used smokeless tobacco. He reports that he does not drink alcohol and does not use drugs.    Family History  Mr. Amador Callahan's family history includes Cancer in his father and sister; Heart disease in his mother.    Medications and Allergies     Medications  Outpatient Medications Marked as Taking for the 4/27/22 encounter (Office Visit) with Kaya Go DNP, FNP   Medication Sig Dispense Refill    atorvastatin (LIPITOR) 40 MG tablet Take 1 tablet (40 mg total) by mouth once daily. 90 tablet 1    busPIRone (BUSPAR) 10 MG tablet Take 1 tablet (10 mg total) by mouth 2 (two) times a day. 180 tablet 1    cyclobenzaprine (FLEXERIL) 5 MG tablet Take 2.5-5 mg by mouth every 8 (eight) hours as needed.      lisinopriL 10 MG tablet Take 1 tablet (10 mg total) by mouth once daily. 90 tablet 1    omeprazole (PRILOSEC) 20 MG capsule Take 1 capsule (20 mg total) by mouth once daily. 90 capsule 1     Current Facility-Administered Medications for the 4/27/22 encounter (Office Visit) with Kaya Go DNP, FNP   Medication Dose Route Frequency Provider Last Rate Last Admin    [COMPLETED] ketorolac injection 30 mg  30 mg Intramuscular 1 time in Clinic/HOD Kaya HALEY  "Go, VERONIKA, FNP   30 mg at 04/27/22 0925       Allergies  Review of patient's allergies indicates:  No Known Allergies    Physical Examination     Vitals:    04/27/22 0921   BP: (!) 179/99   Pulse: 88   Resp:    Temp:      Vitals:    04/27/22 0857 04/27/22 0921   BP: (!) 166/91 (!) 179/99   Pulse: 85 88   Resp: 20    Temp: 98.6 °F (37 °C)    TempSrc: Oral    SpO2: 98%    Weight: 73 kg (161 lb)    Height: 5' 6" (1.676 m)        Physical Exam  Vitals and nursing note reviewed.   Constitutional:       General: He is not in acute distress.     Appearance: He is normal weight.   HENT:      Mouth/Throat:      Mouth: Mucous membranes are moist.   Eyes:      Pupils: Pupils are equal, round, and reactive to light.   Cardiovascular:      Rate and Rhythm: Normal rate and regular rhythm.      Pulses: Normal pulses.      Heart sounds: No murmur heard.  Pulmonary:      Effort: Pulmonary effort is normal. No respiratory distress.      Breath sounds: Normal breath sounds. No wheezing, rhonchi or rales.   Chest:      Chest wall: No tenderness.   Abdominal:      General: Bowel sounds are normal. There is no distension.      Palpations: Abdomen is soft.      Tenderness: There is no abdominal tenderness. There is no right CVA tenderness, left CVA tenderness, guarding or rebound.   Musculoskeletal:         General: No swelling. Normal range of motion.      Right shoulder: Tenderness present. Normal range of motion.        Arms:       Cervical back: Normal range of motion and neck supple.      Right lower leg: No edema.      Left lower leg: No edema.   Skin:     General: Skin is warm and dry.   Neurological:      General: No focal deficit present.      Mental Status: He is alert and oriented to person, place, and time.   Psychiatric:         Mood and Affect: Mood normal.         Behavior: Behavior normal.         Thought Content: Thought content normal.         Judgment: Judgment normal.          Assessment and Plan (including Health " Maintenance)      Problem List  Smart Sets  Document Outside HM   :    Plan:     Pt needs to monitor blood pressure at home and return to clinic if remains elevated > 140/90.     Health Maintenance Due   Topic Date Due    PROSTATE-SPECIFIC ANTIGEN  Never done    Hepatitis C Screening  Never done       Problem List Items Addressed This Visit        Neuro    History of cerebrovascular accident (Chronic)       Psychiatric    Anxiety (Chronic)       Cardiac/Vascular    Hyperlipidemia (Chronic)    Hypertension (Chronic)       GI    GERD (gastroesophageal reflux disease) (Chronic)      Other Visit Diagnoses     Chronic right shoulder pain    -  Primary    Relevant Medications    ketorolac injection 30 mg (Completed) (Start on 4/27/2022  9:30 AM)        Chronic right shoulder pain  -     ketorolac injection 30 mg    History of cerebrovascular accident    Anxiety    Hyperlipidemia, unspecified hyperlipidemia type    Hypertension, unspecified type    Gastroesophageal reflux disease, unspecified whether esophagitis present       Health Maintenance Topics with due status: Not Due       Topic Last Completion Date    Colorectal Cancer Screening 05/17/2021    Lipid Panel 12/03/2021    High Dose Statin 04/27/2022           Future Appointments   Date Time Provider Department Center   4/12/2023  2:00 PM AWV NURSE, [facility] [place]        Follow up in about 4 weeks (around 5/25/2022).     Signature:  Kaya Go DNP, FNP  [address] [place] [address]  Phone #: [phone]  Fax #: [phone]    Date of encounter: 4/27/22    Patient Instructions   Physical therapy 3 times a week for 1 month. Heat to affected area. Aleve 2 times a day for 1 week. Take prescribed Flexeril 3 times a day for 1 week, then as needed. Follow up in 1 month.

## 2022-04-29 ENCOUNTER — TELEPHONE (OUTPATIENT)
Dept: FAMILY MEDICINE | Facility: CLINIC | Age: 62
End: 2022-04-29
Payer: COMMERCIAL

## 2022-04-29 DIAGNOSIS — R29.818 OTHER SYMPTOMS AND SIGNS INVOLVING THE NERVOUS SYSTEM: ICD-10-CM

## 2022-04-29 DIAGNOSIS — R25.1 OCCASIONAL TREMORS: ICD-10-CM

## 2022-04-29 DIAGNOSIS — G89.29 CHRONIC RIGHT SHOULDER PAIN: Primary | ICD-10-CM

## 2022-04-29 DIAGNOSIS — R26.89 BALANCE PROBLEM: ICD-10-CM

## 2022-04-29 DIAGNOSIS — M25.511 CHRONIC RIGHT SHOULDER PAIN: Primary | ICD-10-CM

## 2022-04-29 NOTE — TELEPHONE ENCOUNTER
Spoke with MANOLO - [facility] at [facility] outpatient therapy dept   States Mr. reyes came in with right shoulder pain 10/10   Hx of stroke with facial dropping - but reports patient was off balance, could not hardly lift right arm and had tremors - would like patient to have CT scan and right shoulder xray    Will order CT scan and right shoulder xray

## 2022-05-05 ENCOUNTER — HOSPITAL ENCOUNTER (OUTPATIENT)
Dept: RADIOLOGY | Facility: HOSPITAL | Age: 62
Discharge: HOME | End: 2022-05-05
Attending: NURSE PRACTITIONER
Payer: COMMERCIAL

## 2022-05-05 DIAGNOSIS — R29.818 OTHER SYMPTOMS AND SIGNS INVOLVING THE NERVOUS SYSTEM: ICD-10-CM

## 2022-05-05 DIAGNOSIS — G89.29 CHRONIC RIGHT SHOULDER PAIN: ICD-10-CM

## 2022-05-05 DIAGNOSIS — R25.1 OCCASIONAL TREMORS: ICD-10-CM

## 2022-05-05 DIAGNOSIS — R26.89 BALANCE PROBLEM: ICD-10-CM

## 2022-05-05 DIAGNOSIS — M25.511 CHRONIC RIGHT SHOULDER PAIN: ICD-10-CM

## 2022-05-05 PROCEDURE — 70450 CT HEAD/BRAIN W/O DYE: CPT | Mod: TC

## 2022-05-05 PROCEDURE — 73030 X-RAY EXAM OF SHOULDER: CPT | Mod: TC,RT

## 2022-05-19 ENCOUNTER — OFFICE VISIT (OUTPATIENT)
Dept: FAMILY MEDICINE | Facility: CLINIC | Age: 62
End: 2022-05-19
Payer: COMMERCIAL

## 2022-05-19 VITALS
OXYGEN SATURATION: 98 % | SYSTOLIC BLOOD PRESSURE: 134 MMHG | TEMPERATURE: 98 F | RESPIRATION RATE: 20 BRPM | WEIGHT: 161 LBS | HEIGHT: 66 IN | BODY MASS INDEX: 25.88 KG/M2 | DIASTOLIC BLOOD PRESSURE: 89 MMHG | HEART RATE: 72 BPM

## 2022-05-19 DIAGNOSIS — M25.511 CHRONIC RIGHT SHOULDER PAIN: ICD-10-CM

## 2022-05-19 DIAGNOSIS — G89.29 CHRONIC RIGHT SHOULDER PAIN: ICD-10-CM

## 2022-05-19 DIAGNOSIS — M54.12 CERVICAL RADICULOPATHY: Primary | ICD-10-CM

## 2022-05-19 PROCEDURE — 4010F ACE/ARB THERAPY RXD/TAKEN: CPT | Mod: ,,, | Performed by: NURSE PRACTITIONER

## 2022-05-19 PROCEDURE — 3075F SYST BP GE 130 - 139MM HG: CPT | Mod: ,,, | Performed by: NURSE PRACTITIONER

## 2022-05-19 PROCEDURE — 3008F BODY MASS INDEX DOCD: CPT | Mod: ,,, | Performed by: NURSE PRACTITIONER

## 2022-05-19 PROCEDURE — 1160F PR REVIEW ALL MEDS BY PRESCRIBER/CLIN PHARMACIST DOCUMENTED: ICD-10-PCS | Mod: ,,, | Performed by: NURSE PRACTITIONER

## 2022-05-19 PROCEDURE — 1160F RVW MEDS BY RX/DR IN RCRD: CPT | Mod: ,,, | Performed by: NURSE PRACTITIONER

## 2022-05-19 PROCEDURE — 1159F MED LIST DOCD IN RCRD: CPT | Mod: ,,, | Performed by: NURSE PRACTITIONER

## 2022-05-19 PROCEDURE — 3079F PR MOST RECENT DIASTOLIC BLOOD PRESSURE 80-89 MM HG: ICD-10-PCS | Mod: ,,, | Performed by: NURSE PRACTITIONER

## 2022-05-19 PROCEDURE — 99214 OFFICE O/P EST MOD 30 MIN: CPT | Mod: ,,, | Performed by: NURSE PRACTITIONER

## 2022-05-19 PROCEDURE — 4010F PR ACE/ARB THEARPY RXD/TAKEN: ICD-10-PCS | Mod: ,,, | Performed by: NURSE PRACTITIONER

## 2022-05-19 PROCEDURE — 3008F PR BODY MASS INDEX (BMI) DOCUMENTED: ICD-10-PCS | Mod: ,,, | Performed by: NURSE PRACTITIONER

## 2022-05-19 PROCEDURE — 3075F PR MOST RECENT SYSTOLIC BLOOD PRESS GE 130-139MM HG: ICD-10-PCS | Mod: ,,, | Performed by: NURSE PRACTITIONER

## 2022-05-19 PROCEDURE — 99214 PR OFFICE/OUTPT VISIT, EST, LEVL IV, 30-39 MIN: ICD-10-PCS | Mod: ,,, | Performed by: NURSE PRACTITIONER

## 2022-05-19 PROCEDURE — 1159F PR MEDICATION LIST DOCUMENTED IN MEDICAL RECORD: ICD-10-PCS | Mod: ,,, | Performed by: NURSE PRACTITIONER

## 2022-05-19 PROCEDURE — 3079F DIAST BP 80-89 MM HG: CPT | Mod: ,,, | Performed by: NURSE PRACTITIONER

## 2022-05-19 RX ORDER — CYCLOBENZAPRINE HCL 5 MG
5 TABLET ORAL EVERY 8 HOURS PRN
Qty: 30 TABLET | Refills: 0 | Status: SHIPPED | OUTPATIENT
Start: 2022-05-19 | End: 2022-06-02 | Stop reason: SDUPTHER

## 2022-05-19 NOTE — PROGRESS NOTES
Kaya Go DNP, FNP    [address] [place] [address]     PATIENT NAME: Amador Callahan  : 1960  DATE: 22  MRN: [id number]      Billing Provider: Kaya Go DNP, FNP  Level of Service:   Patient PCP Information     Provider PCP Type    Kaya Go DNP, FNP General          Reason for Visit / Chief Complaint: Shoulder Pain (Patient states he is having right shoulder pain radiates down his arm to his arm pit, fingers, also complains of pain in right rib cage area. States pain has been present for about a month. Denies injury. States he has been taking ibuprofen but it hasn't helped. States he went to physical therapy but was discharged because he could not tolerate the pain. Patient is requesting an MRI.), Arm Pain, ribcage pain, and Hand Pain       Update PCP  Update Chief Complaint         History of Present Illness / Problem Focused Workflow     Amador Callahan presents to the clinic with Shoulder Pain (Patient states he is having right shoulder pain radiates down his arm to his arm pit, fingers, also complains of pain in right rib cage area. States pain has been present for about a month. Denies injury. States he has been taking ibuprofen but it hasn't helped. States he went to physical therapy but was discharged because he could not tolerate the pain. Patient is requesting an MRI.), Arm Pain, ribcage pain, and Hand Pain     Pt c/o continued right shoulder arm pain for approx 2 months. Pt was discharged from PT recently due to pain during therapy and was recommended by therapist that it is neuropathic pain from cervical spine. Pt has taken NSAIDs and done PT with no improvement and worsening in symptoms.        Review of Systems     Review of Systems   Constitutional: Negative for activity change and appetite change.   HENT: Negative for dental problem, ear pain, sinus pressure/congestion and sore throat.    Respiratory: Negative for cough,  chest tightness and shortness of breath.    Cardiovascular: Negative for chest pain and palpitations.   Gastrointestinal: Negative for abdominal pain.   Genitourinary: Negative for bladder incontinence and dysuria.   Musculoskeletal: Positive for arthralgias and neck pain.        Right shoulder pain  Right arm pain  Right hand pain  Right rib cage pain.   Integumentary:  Negative for rash.   Neurological: Negative for dizziness, weakness and numbness.        Medical / Social / Family History     Past Medical History:   Diagnosis Date    Adenomatous polyp of descending colon 5/17/2021    Anxiety     Diverticula, colon 5/17/2021    GERD (gastroesophageal reflux disease)     History of cerebrovascular accident     Hyperlipidemia     Hypertension     Screening for malignant neoplasm of colon 5/17/2021       Past Surgical History:   Procedure Laterality Date    heart cath         Social History  Mr. Amador Callahan  reports that he quit smoking about 42 years ago. His smoking use included cigarettes. He started smoking about 44 years ago. He has a 2.00 pack-year smoking history. He has never used smokeless tobacco. He reports that he does not drink alcohol and does not use drugs.    Family History  Mr. Amador Callahan's family history includes Cancer in his father and sister; Heart disease in his mother.    Medications and Allergies     Medications  Outpatient Medications Marked as Taking for the 5/19/22 encounter (Office Visit) with Kaya Go, VERONIKA, FNP   Medication Sig Dispense Refill    atorvastatin (LIPITOR) 40 MG tablet Take 1 tablet (40 mg total) by mouth once daily. 90 tablet 1    busPIRone (BUSPAR) 10 MG tablet Take 1 tablet (10 mg total) by mouth 2 (two) times a day. 180 tablet 1    lisinopriL 10 MG tablet Take 1 tablet (10 mg total) by mouth once daily. 90 tablet 1    omeprazole (PRILOSEC) 20 MG capsule Take 1 capsule (20 mg total) by mouth once daily. 90 capsule 1    [DISCONTINUED]  cyclobenzaprine (FLEXERIL) 5 MG tablet Take 2.5-5 mg by mouth every 8 (eight) hours as needed.         Allergies  Review of patient's allergies indicates:  No Known Allergies    Physical Examination     Vitals:    05/19/22 0845   BP: 134/89   Pulse: 72   Resp: 20   Temp: 97.7 °F (36.5 °C)     Physical Exam  Vitals and nursing note reviewed.   Constitutional:       General: He is not in acute distress.     Appearance: He is normal weight.   HENT:      Mouth/Throat:      Mouth: Mucous membranes are moist.   Eyes:      Pupils: Pupils are equal, round, and reactive to light.   Cardiovascular:      Rate and Rhythm: Normal rate and regular rhythm.      Pulses: Normal pulses.      Heart sounds: No murmur heard.  Pulmonary:      Effort: Pulmonary effort is normal. No respiratory distress.      Breath sounds: Normal breath sounds. No wheezing, rhonchi or rales.   Chest:      Chest wall: No tenderness.   Abdominal:      General: Bowel sounds are normal. There is no distension.      Palpations: Abdomen is soft.      Tenderness: There is no abdominal tenderness. There is no right CVA tenderness, left CVA tenderness, guarding or rebound.   Musculoskeletal:         General: No swelling or tenderness.      Cervical back: Neck supple. Spasms present. Pain with movement present. Decreased range of motion.      Right lower leg: No edema.      Left lower leg: No edema.      Comments: + shoulder adduction   Skin:     General: Skin is warm and dry.   Neurological:      General: No focal deficit present.      Mental Status: He is alert and oriented to person, place, and time.      Comments: Facial asymmetrical (chronic)   Psychiatric:         Mood and Affect: Mood normal.         Behavior: Behavior normal.         Thought Content: Thought content normal.         Judgment: Judgment normal.          Assessment and Plan (including Health Maintenance)      Problem List  Smart Sets  Document Outside HM   :    Plan:         Health Maintenance  Due   Topic Date Due    PROSTATE-SPECIFIC ANTIGEN  Never done    Hepatitis C Screening  Never done       Problem List Items Addressed This Visit    None     Visit Diagnoses     Cervical radiculopathy    -  Primary    Relevant Orders    MRI Cervical Spine Without Contrast    Chronic right shoulder pain        Relevant Medications    cyclobenzaprine (FLEXERIL) 5 MG tablet        Cervical radiculopathy  -     MRI Cervical Spine Without Contrast; Future; Expected date: 05/19/2022    Chronic right shoulder pain  -     cyclobenzaprine (FLEXERIL) 5 MG tablet; Take 1 tablet (5 mg total) by mouth every 8 (eight) hours as needed for Muscle spasms.  Dispense: 30 tablet; Refill: 0       Health Maintenance Topics with due status: Not Due       Topic Last Completion Date    Colorectal Cancer Screening 05/17/2021    Lipid Panel 12/03/2021    High Dose Statin 04/27/2022    Influenza Vaccine Not Due           Future Appointments   Date Time Provider Department Center   4/12/2023  2:00 PM AWV NURSE, [facility] [place]        Follow up if symptoms worsen or fail to improve.     Signature:  Kaya Go DNP, FNP  [address] [place] [address]  Phone #: [phone]  Fax #: [phone]    Date of encounter: 5/19/22    Patient Instructions   Will refer for MRI cervical spine pending insurance approval.

## 2022-06-01 ENCOUNTER — HOSPITAL ENCOUNTER (OUTPATIENT)
Dept: RADIOLOGY | Facility: HOSPITAL | Age: 62
Discharge: HOME | End: 2022-06-01
Attending: NURSE PRACTITIONER
Payer: COMMERCIAL

## 2022-06-01 DIAGNOSIS — M54.12 CERVICAL RADICULOPATHY: ICD-10-CM

## 2022-06-01 PROCEDURE — 72141 MRI NECK SPINE W/O DYE: CPT | Mod: TC

## 2022-06-02 ENCOUNTER — OFFICE VISIT (OUTPATIENT)
Dept: FAMILY MEDICINE | Facility: CLINIC | Age: 62
End: 2022-06-02
Payer: COMMERCIAL

## 2022-06-02 VITALS
RESPIRATION RATE: 20 BRPM | SYSTOLIC BLOOD PRESSURE: 132 MMHG | OXYGEN SATURATION: 98 % | WEIGHT: 161 LBS | TEMPERATURE: 98 F | BODY MASS INDEX: 25.88 KG/M2 | HEIGHT: 66 IN | DIASTOLIC BLOOD PRESSURE: 83 MMHG | HEART RATE: 83 BPM

## 2022-06-02 DIAGNOSIS — I10 HYPERTENSION, UNSPECIFIED TYPE: ICD-10-CM

## 2022-06-02 DIAGNOSIS — G89.29 CHRONIC RIGHT SHOULDER PAIN: ICD-10-CM

## 2022-06-02 DIAGNOSIS — M25.511 CHRONIC RIGHT SHOULDER PAIN: ICD-10-CM

## 2022-06-02 DIAGNOSIS — F41.9 ANXIETY: ICD-10-CM

## 2022-06-02 DIAGNOSIS — M48.02 NEURAL FORAMINAL STENOSIS OF CERVICAL SPINE: Primary | ICD-10-CM

## 2022-06-02 DIAGNOSIS — E78.5 HYPERLIPIDEMIA, UNSPECIFIED HYPERLIPIDEMIA TYPE: ICD-10-CM

## 2022-06-02 LAB
ALBUMIN SERPL BCP-MCNC: 3.8 G/DL (ref 3.5–5)
ALBUMIN/GLOB SERPL: 0.9 {RATIO}
ALP SERPL-CCNC: 161 U/L (ref 45–115)
ALT SERPL W P-5'-P-CCNC: 78 U/L (ref 16–61)
ANION GAP SERPL CALCULATED.3IONS-SCNC: 12 MMOL/L (ref 7–16)
AST SERPL W P-5'-P-CCNC: 32 U/L (ref 15–37)
BASOPHILS # BLD AUTO: 0.05 K/UL (ref 0–0.2)
BASOPHILS NFR BLD AUTO: 0.6 % (ref 0–1)
BILIRUB SERPL-MCNC: 0.5 MG/DL (ref 0–1.2)
BUN SERPL-MCNC: 11 MG/DL (ref 7–18)
BUN/CREAT SERPL: 11 (ref 6–20)
CALCIUM SERPL-MCNC: 9 MG/DL (ref 8.5–10.1)
CHLORIDE SERPL-SCNC: 104 MMOL/L (ref 98–107)
CHOLEST SERPL-MCNC: 150 MG/DL (ref 0–200)
CHOLEST/HDLC SERPL: 3.6 {RATIO}
CO2 SERPL-SCNC: 28 MMOL/L (ref 21–32)
CREAT SERPL-MCNC: 1 MG/DL (ref 0.7–1.3)
CREAT UR-MCNC: 137 MG/DL (ref 39–259)
DIFFERENTIAL METHOD BLD: ABNORMAL
EOSINOPHIL # BLD AUTO: 0.35 K/UL (ref 0–0.5)
EOSINOPHIL NFR BLD AUTO: 4.3 % (ref 1–4)
ERYTHROCYTE [DISTWIDTH] IN BLOOD BY AUTOMATED COUNT: 12.5 % (ref 11.5–14.5)
GLOBULIN SER-MCNC: 4.3 G/DL (ref 2–4)
GLUCOSE SERPL-MCNC: 105 MG/DL (ref 74–106)
HCT VFR BLD AUTO: 46.4 % (ref 40–54)
HDLC SERPL-MCNC: 42 MG/DL (ref 40–60)
HGB BLD-MCNC: 14.8 G/DL (ref 13.5–18)
IMM GRANULOCYTES # BLD AUTO: 0.02 K/UL (ref 0–0.04)
IMM GRANULOCYTES NFR BLD: 0.2 % (ref 0–0.4)
LDLC SERPL CALC-MCNC: 83 MG/DL
LDLC/HDLC SERPL: 2 {RATIO}
LYMPHOCYTES # BLD AUTO: 1.71 K/UL (ref 1–4.8)
LYMPHOCYTES NFR BLD AUTO: 20.9 % (ref 27–41)
MCH RBC QN AUTO: 27.1 PG (ref 27–31)
MCHC RBC AUTO-ENTMCNC: 31.9 G/DL (ref 32–36)
MCV RBC AUTO: 85 FL (ref 80–96)
MICROALBUMIN UR-MCNC: 1.5 MG/DL (ref 0–2.8)
MICROALBUMIN/CREAT RATIO PNL UR: 10.9 MG/G (ref 0–30)
MONOCYTES # BLD AUTO: 0.58 K/UL (ref 0–0.8)
MONOCYTES NFR BLD AUTO: 7.1 % (ref 2–6)
MPC BLD CALC-MCNC: 11.1 FL (ref 9.4–12.4)
NEUTROPHILS # BLD AUTO: 5.47 K/UL (ref 1.8–7.7)
NEUTROPHILS NFR BLD AUTO: 66.9 % (ref 53–65)
NONHDLC SERPL-MCNC: 108 MG/DL
NRBC # BLD AUTO: 0 X10E3/UL
NRBC, AUTO (.00): 0 %
PLATELET # BLD AUTO: 252 K/UL (ref 150–400)
POTASSIUM SERPL-SCNC: 4.1 MMOL/L (ref 3.5–5.1)
PROT SERPL-MCNC: 8.1 G/DL (ref 6.4–8.2)
RBC # BLD AUTO: 5.46 M/UL (ref 4.6–6.2)
SODIUM SERPL-SCNC: 140 MMOL/L (ref 136–145)
TRIGL SERPL-MCNC: 123 MG/DL (ref 35–150)
VLDLC SERPL-MCNC: 25 MG/DL
WBC # BLD AUTO: 8.18 K/UL (ref 4.5–11)

## 2022-06-02 PROCEDURE — 4010F ACE/ARB THERAPY RXD/TAKEN: CPT | Mod: ,,, | Performed by: NURSE PRACTITIONER

## 2022-06-02 PROCEDURE — 1160F RVW MEDS BY RX/DR IN RCRD: CPT | Mod: ,,, | Performed by: NURSE PRACTITIONER

## 2022-06-02 PROCEDURE — 80053 COMPREHENSIVE METABOLIC PANEL: ICD-10-PCS | Mod: ,,, | Performed by: CLINICAL MEDICAL LABORATORY

## 2022-06-02 PROCEDURE — 80053 COMPREHEN METABOLIC PANEL: CPT | Mod: ,,, | Performed by: CLINICAL MEDICAL LABORATORY

## 2022-06-02 PROCEDURE — 1159F PR MEDICATION LIST DOCUMENTED IN MEDICAL RECORD: ICD-10-PCS | Mod: ,,, | Performed by: NURSE PRACTITIONER

## 2022-06-02 PROCEDURE — 99214 OFFICE O/P EST MOD 30 MIN: CPT | Mod: ,,, | Performed by: NURSE PRACTITIONER

## 2022-06-02 PROCEDURE — 1159F MED LIST DOCD IN RCRD: CPT | Mod: ,,, | Performed by: NURSE PRACTITIONER

## 2022-06-02 PROCEDURE — 80061 LIPID PANEL: CPT | Mod: ,,, | Performed by: CLINICAL MEDICAL LABORATORY

## 2022-06-02 PROCEDURE — 99214 PR OFFICE/OUTPT VISIT, EST, LEVL IV, 30-39 MIN: ICD-10-PCS | Mod: ,,, | Performed by: NURSE PRACTITIONER

## 2022-06-02 PROCEDURE — 3008F BODY MASS INDEX DOCD: CPT | Mod: ,,, | Performed by: NURSE PRACTITIONER

## 2022-06-02 PROCEDURE — 3008F PR BODY MASS INDEX (BMI) DOCUMENTED: ICD-10-PCS | Mod: ,,, | Performed by: NURSE PRACTITIONER

## 2022-06-02 PROCEDURE — 1160F PR REVIEW ALL MEDS BY PRESCRIBER/CLIN PHARMACIST DOCUMENTED: ICD-10-PCS | Mod: ,,, | Performed by: NURSE PRACTITIONER

## 2022-06-02 PROCEDURE — 3079F PR MOST RECENT DIASTOLIC BLOOD PRESSURE 80-89 MM HG: ICD-10-PCS | Mod: ,,, | Performed by: NURSE PRACTITIONER

## 2022-06-02 PROCEDURE — 82043 UR ALBUMIN QUANTITATIVE: CPT | Mod: ,,, | Performed by: CLINICAL MEDICAL LABORATORY

## 2022-06-02 PROCEDURE — 4010F PR ACE/ARB THEARPY RXD/TAKEN: ICD-10-PCS | Mod: ,,, | Performed by: NURSE PRACTITIONER

## 2022-06-02 PROCEDURE — 82570 ASSAY OF URINE CREATININE: CPT | Mod: ,,, | Performed by: CLINICAL MEDICAL LABORATORY

## 2022-06-02 PROCEDURE — 3075F PR MOST RECENT SYSTOLIC BLOOD PRESS GE 130-139MM HG: ICD-10-PCS | Mod: ,,, | Performed by: NURSE PRACTITIONER

## 2022-06-02 PROCEDURE — 85025 CBC WITH DIFFERENTIAL: ICD-10-PCS | Mod: ,,, | Performed by: CLINICAL MEDICAL LABORATORY

## 2022-06-02 PROCEDURE — 82043 MICROALBUMIN / CREATININE RATIO URINE: ICD-10-PCS | Mod: ,,, | Performed by: CLINICAL MEDICAL LABORATORY

## 2022-06-02 PROCEDURE — 82570 MICROALBUMIN / CREATININE RATIO URINE: ICD-10-PCS | Mod: ,,, | Performed by: CLINICAL MEDICAL LABORATORY

## 2022-06-02 PROCEDURE — 3079F DIAST BP 80-89 MM HG: CPT | Mod: ,,, | Performed by: NURSE PRACTITIONER

## 2022-06-02 PROCEDURE — 3075F SYST BP GE 130 - 139MM HG: CPT | Mod: ,,, | Performed by: NURSE PRACTITIONER

## 2022-06-02 PROCEDURE — 85025 COMPLETE CBC W/AUTO DIFF WBC: CPT | Mod: ,,, | Performed by: CLINICAL MEDICAL LABORATORY

## 2022-06-02 PROCEDURE — 80061 LIPID PANEL: ICD-10-PCS | Mod: ,,, | Performed by: CLINICAL MEDICAL LABORATORY

## 2022-06-02 RX ORDER — ATORVASTATIN CALCIUM 40 MG/1
40 TABLET, FILM COATED ORAL DAILY
Qty: 90 TABLET | Refills: 1 | Status: SHIPPED | OUTPATIENT
Start: 2022-06-02 | End: 2022-12-08 | Stop reason: SDUPTHER

## 2022-06-02 RX ORDER — BUSPIRONE HYDROCHLORIDE 10 MG/1
10 TABLET ORAL 2 TIMES DAILY
Qty: 180 TABLET | Refills: 1 | Status: SHIPPED | OUTPATIENT
Start: 2022-06-02 | End: 2022-12-08 | Stop reason: SDUPTHER

## 2022-06-02 RX ORDER — CYCLOBENZAPRINE HCL 5 MG
5 TABLET ORAL EVERY 8 HOURS PRN
Qty: 30 TABLET | Refills: 0 | Status: SHIPPED | OUTPATIENT
Start: 2022-06-02 | End: 2023-06-01 | Stop reason: SDUPTHER

## 2022-06-02 RX ORDER — LISINOPRIL 10 MG/1
10 TABLET ORAL DAILY
Qty: 90 TABLET | Refills: 1 | Status: SHIPPED | OUTPATIENT
Start: 2022-06-02 | End: 2022-12-08 | Stop reason: SDUPTHER

## 2022-06-02 NOTE — PROGRESS NOTES
Kaya Go DNP, FNP    [address] [place] [address]     PATIENT NAME: Amador Callahan  : 1960  DATE: 22  MRN: [id number]      Billing Provider: Kaya Go DNP, FNP  Level of Service:   Patient PCP Information     Provider PCP Type    Kaya Go DNP, FNP General          Reason for Visit / Chief Complaint: Medication Refill and Shoulder Pain (Patient here today for medication refill, and right shoulder pain. States he had his MRI done yesterday at [place].)       Update PCP  Update Chief Complaint         History of Present Illness / Problem Focused Workflow     Amador Callahan presents to the clinic with Medication Refill and Shoulder Pain (Patient here today for medication refill, and right shoulder pain. States he had his MRI done yesterday at [place].)     Pt here for results of MRI cervical spine and med refills. Pt continues to have cervical radiculopathy to right upper extremity.       Review of Systems     Review of Systems   Constitutional: Negative for activity change and appetite change.   HENT: Negative for dental problem, ear pain, sinus pressure/congestion and sore throat.    Respiratory: Negative for cough, chest tightness and shortness of breath.    Cardiovascular: Negative for chest pain and palpitations.   Gastrointestinal: Negative for abdominal pain.   Genitourinary: Negative for bladder incontinence and dysuria.   Musculoskeletal: Positive for arthralgias and neck pain.        Right shoulder pain   Integumentary:  Negative for rash.   Neurological: Negative for dizziness, weakness and numbness.        Medical / Social / Family History     Past Medical History:   Diagnosis Date    Adenomatous polyp of descending colon 2021    Anxiety     Diverticula, colon 2021    GERD (gastroesophageal reflux disease)     History of cerebrovascular accident     Hyperlipidemia     Hypertension     Screening for malignant  neoplasm of colon 5/17/2021       Past Surgical History:   Procedure Laterality Date    heart cath         Social History  . Amador Callahan  reports that he quit smoking about 42 years ago. His smoking use included cigarettes. He started smoking about 44 years ago. He has a 2.00 pack-year smoking history. He has never used smokeless tobacco. He reports that he does not drink alcohol and does not use drugs.    Family History  Mr. Amador Callahan's family history includes Cancer in his father and sister; Heart disease in his mother.    Medications and Allergies     Medications  Outpatient Medications Marked as Taking for the 6/2/22 encounter (Office Visit) with Kaya Go, VERONIKA, FNP   Medication Sig Dispense Refill    omeprazole (PRILOSEC) 20 MG capsule Take 1 capsule (20 mg total) by mouth once daily. 90 capsule 1    [DISCONTINUED] atorvastatin (LIPITOR) 40 MG tablet Take 1 tablet (40 mg total) by mouth once daily. 90 tablet 1    [DISCONTINUED] busPIRone (BUSPAR) 10 MG tablet Take 1 tablet (10 mg total) by mouth 2 (two) times a day. 180 tablet 1    [DISCONTINUED] cyclobenzaprine (FLEXERIL) 5 MG tablet Take 1 tablet (5 mg total) by mouth every 8 (eight) hours as needed for Muscle spasms. 30 tablet 0    [DISCONTINUED] lisinopriL 10 MG tablet Take 1 tablet (10 mg total) by mouth once daily. 90 tablet 1       Allergies  Review of patient's allergies indicates:  No Known Allergies    Physical Examination     Vitals:    06/02/22 1013   BP: 132/83   Pulse: 83   Resp: 20   Temp: 97.7 °F (36.5 °C)     Physical Exam  Vitals and nursing note reviewed.   Constitutional:       General: He is not in acute distress.     Appearance: He is normal weight.   HENT:      Mouth/Throat:      Mouth: Mucous membranes are moist.      Comments: Mouth drawn--chronic  Eyes:      Pupils: Pupils are equal, round, and reactive to light.   Cardiovascular:      Rate and Rhythm: Normal rate and regular rhythm.      Pulses: Normal pulses.       Heart sounds: No murmur heard.  Pulmonary:      Effort: Pulmonary effort is normal. No respiratory distress.      Breath sounds: Normal breath sounds. No wheezing, rhonchi or rales.   Chest:      Chest wall: No tenderness.   Abdominal:      General: Bowel sounds are normal. There is no distension.      Palpations: Abdomen is soft.      Tenderness: There is no abdominal tenderness. There is no right CVA tenderness, left CVA tenderness, guarding or rebound.   Musculoskeletal:         General: No swelling or tenderness. Normal range of motion.      Cervical back: Normal range of motion and neck supple.      Right lower leg: No edema.      Left lower leg: No edema.   Skin:     General: Skin is warm and dry.   Neurological:      General: No focal deficit present.      Mental Status: He is alert and oriented to person, place, and time.   Psychiatric:         Mood and Affect: Mood normal.         Behavior: Behavior normal.         Thought Content: Thought content normal.         Judgment: Judgment normal.          Assessment and Plan (including Health Maintenance)      Problem List  Smart Sets  Document Outside HM   :    Plan:     MRI Cervical Spine Without Contrast  Order: [id number]   Status: Final result     Visible to patient: No (inaccessible in Patient Portal)     Next appt: 07/18/2022 at 01:00 PM in Pain Medicine (Angelic Hdz MD)     Dx: Cervical radiculopathy     1 Result Note    Details    Reading Physician Reading Date Result Priority   Samson Tenorio DO  [phone] 6/1/2022 Routine     Narrative & Impression  EXAMINATION:  MRI CERVICAL SPINE WITHOUT CONTRAST     CLINICAL HISTORY:  Radiculopathy, cervical regionCervical radiculopathy, no red flags;cervical radiculopathy;     COMPARISON:  None     TECHNIQUE:  Multiplanar MRI imaging of the cervical spine was obtained without the use of intravenous contrast.     FINDINGS:  There is straightening of normal cervical lordosis which may be positional or  secondary to muscle spasm. There is no significant anterolisthesis or retrolisthesis.  Multilevel mild marginal vertebral body osteophyte formation.  Multilevel disc desiccation.     Disc levels:     C2/C3: No significant disc bulge, neuroforaminal narrowing or spinal canal stenosis.     C3/C4: Small posterior disc osteophyte complex formation with moderate spinal canal narrowing.  Posterior facet and uncovertebral joint hypertrophy result in mild right and mild left neuroforaminal narrowing.     C4/C5: Small posterior disc osteophyte complex formation with mild spinal canal narrowing.  Posterior facet and uncovertebral joint hypertrophy result in moderate to severe right and mild left neuroforaminal narrowing.     C5/C6: Moderate loss of disc space height.  Posterior disc osteophyte complex with moderate spinal canal narrowing. Posterior facet and uncovertebral joint hypertrophy result in moderate to severe right and moderate to severe left neuroforaminal narrowing.     C6/C7: Small posterior disc osteophyte complex formation with mild spinal canal narrowing.  Posterior facet and uncovertebral joint hypertrophy result in mild to moderate right and moderate to severe left neuroforaminal narrowing.     C7/T1: No significant disc bulge, neuroforaminal narrowing or spinal canal stenosis.     Impression:     Degenerative change of the cervical spine with spinal canal and neuroforaminal narrowing as detailed above.  Moderate spinal canal narrowing noted at a few levels.  Moderate/severe neural foraminal narrowing noted at several locations.     Point of Service: [facility]        Electronically signed by: Samson Tenorio  Date:                                            06/01/2022  Time:                                           12:21         Health Maintenance Due   Topic Date Due    PROSTATE-SPECIFIC ANTIGEN  Never done    Hepatitis C Screening  Never done       Problem List Items Addressed This Visit         Psychiatric    Anxiety (Chronic)    Relevant Medications    busPIRone (BUSPAR) 10 MG tablet       Cardiac/Vascular    Hyperlipidemia (Chronic)    Relevant Medications    atorvastatin (LIPITOR) 40 MG tablet    Other Relevant Orders    Comprehensive Metabolic Panel    Lipid Panel    Hypertension (Chronic)    Relevant Medications    lisinopriL 10 MG tablet    Other Relevant Orders    Comprehensive Metabolic Panel    CBC Auto Differential    Lipid Panel    Microalbumin/Creatinine Ratio, Urine      Other Visit Diagnoses     Neural foraminal stenosis of cervical spine    -  Primary    Relevant Orders    Ambulatory referral/consult to Pain Clinic    Chronic right shoulder pain        Relevant Medications    cyclobenzaprine (FLEXERIL) 5 MG tablet        Neural foraminal stenosis of cervical spine  -     Ambulatory referral/consult to Pain Clinic; Future; Expected date: 06/09/2022    Chronic right shoulder pain  -     cyclobenzaprine (FLEXERIL) 5 MG tablet; Take 1 tablet (5 mg total) by mouth every 8 (eight) hours as needed for Muscle spasms.  Dispense: 30 tablet; Refill: 0    Hyperlipidemia, unspecified hyperlipidemia type  -     atorvastatin (LIPITOR) 40 MG tablet; Take 1 tablet (40 mg total) by mouth once daily.  Dispense: 90 tablet; Refill: 1  -     Comprehensive Metabolic Panel; Future; Expected date: 06/02/2022  -     Lipid Panel; Future; Expected date: 06/02/2022    Hypertension, unspecified type  -     lisinopriL 10 MG tablet; Take 1 tablet (10 mg total) by mouth once daily.  Dispense: 90 tablet; Refill: 1  -     Comprehensive Metabolic Panel; Future; Expected date: 06/02/2022  -     CBC Auto Differential; Future; Expected date: 06/02/2022  -     Lipid Panel; Future; Expected date: 06/02/2022  -     Microalbumin/Creatinine Ratio, Urine    Anxiety  -     busPIRone (BUSPAR) 10 MG tablet; Take 1 tablet (10 mg total) by mouth 2 (two) times a day.  Dispense: 180 tablet; Refill: 1       Health Maintenance Topics with  due status: Not Due       Topic Last Completion Date    Colorectal Cancer Screening 05/17/2021    Lipid Panel 12/03/2021    High Dose Statin 06/02/2022    Influenza Vaccine Not Due         Future Appointments   Date Time Provider Department Center   7/18/2022  1:00 PM Angelic Hdz MD [facility]   4/12/2023  2:00 PM AWV NURSE, [facility]IC[facility]        No follow-ups on file.     Signature:  Kaya Go DNP, FNP  [address] [place] [address]  Phone #: [phone]  Fax #: [phone]    Date of encounter: 6/2/22    Patient Instructions   Will refer to pain treatment for evaluation. Continue current medication regimen. Will call pt with lab results.  Follow up as needed.

## 2022-06-02 NOTE — PATIENT INSTRUCTIONS
Will refer to pain treatment for evaluation. Continue current medication regimen. Will call pt with lab results.  Follow up as needed.

## 2022-07-17 NOTE — PROGRESS NOTES
"Chronic Pain - New Consult    Referring Physician: Angelic Hdz MD       SUBJECTIVE: Disclaimer: This note has been generated using voice-recognition software. There may be typographical errors that have been missed during proof-reading      Initial encounter:    Amador Callahan presents to the clinic for the evaluation of *** pain.       61-year-old male presents for new patient evaluation consultation from [facility] nurse practitioner.  Patient reports a 1 month acute onset of right neck and shoulder pain.  He denies any precipitating events or prior injuries.  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  He started physical therapy in the shoulder 1 month ago but was unable to complete due to pain and discomfort.  Patient notes decreased range of motion of the right arm and shoulder with inability to forward extend.  Current medications are providing ineffective pain relief.  He received an MRI of the cervical spine which revealed multilevel degenerative changes and neural foraminal narrowing moderate to severe at multiple levels.  He has not received an MRI of the right shoulder to rule out a rotator cuff tear or injury.  Based on clinical findings I feel that his pain is probably related to the shoulder and less involvement from the cervical spine therefore I will order an MRI of the right shoulder to about a rotator cuff and of and pending results I will refer to Orthopedic for further evaluation.               Physical Therapy/Home Exercise: {YES/NO:63}        Pain Medications:  has a current medication list which includes the following prescription(s): amoxicillin-clavulanate 500-125mg, atorvastatin, buspirone, cefprozil, ciprofloxacin-hydrocortisone 0.2-1%, cyclobenzaprine, lisinopril, neomycin-polymyxin-hydrocortisone, and omeprazole.      Tried in Past:  NSAIDS-***  TCA-***  SNRI-***  Anti-convulsants-***  Muscle Relaxants-***  Opioids-***  Benzodiazepines-***     4A"s of Opioid Risk " Assessment  Activity: Patient *** perform  ADL  Analgesia:  Patient's pain is partially controlled by current medication.   Aberrant Behavior:  reviewed with no aberrant drug seeking/taking behavior     report:  {:26126}    Patient denies suicidal or homicidal ideations    Pain interventional therapy-***    Chiropractor -***  Acupuncture - ***  TENS unit -***  Spinal decompression -***  Joint replacement -***     Review of Systems   Constitutional: Negative.    HENT: Negative.    Eyes: Negative.    Respiratory: Negative.    Cardiovascular: Negative.    Gastrointestinal: Negative.    Endocrine: Negative.    Genitourinary: Negative.    Musculoskeletal: Positive for arthralgias (Right shoulder), neck pain and neck stiffness.   Integumentary:  Negative.   Allergic/Immunologic: Negative.    Neurological: Positive for weakness (Right upper extremity).   Hematological: Negative.    Psychiatric/Behavioral: Negative.              MRI Cervical Spine Without Contrast  Narrative: EXAMINATION:  MRI CERVICAL SPINE WITHOUT CONTRAST    CLINICAL HISTORY:  Radiculopathy, cervical regionCervical radiculopathy, no red flags;cervical radiculopathy;    COMPARISON:  None    TECHNIQUE:  Multiplanar MRI imaging of the cervical spine was obtained without the use of intravenous contrast.    FINDINGS:  There is straightening of normal cervical lordosis which may be positional or secondary to muscle spasm. There is no significant anterolisthesis or retrolisthesis.  Multilevel mild marginal vertebral body osteophyte formation.  Multilevel disc desiccation.    Disc levels:    C2/C3: No significant disc bulge, neuroforaminal narrowing or spinal canal stenosis.    C3/C4: Small posterior disc osteophyte complex formation with moderate spinal canal narrowing.  Posterior facet and uncovertebral joint hypertrophy result in mild right and mild left neuroforaminal narrowing.    C4/C5: Small posterior disc osteophyte complex formation with mild  spinal canal narrowing.  Posterior facet and uncovertebral joint hypertrophy result in moderate to severe right and mild left neuroforaminal narrowing.    C5/C6: Moderate loss of disc space height.  Posterior disc osteophyte complex with moderate spinal canal narrowing. Posterior facet and uncovertebral joint hypertrophy result in moderate to severe right and moderate to severe left neuroforaminal narrowing.    C6/C7: Small posterior disc osteophyte complex formation with mild spinal canal narrowing.  Posterior facet and uncovertebral joint hypertrophy result in mild to moderate right and moderate to severe left neuroforaminal narrowing.    C7/T1: No significant disc bulge, neuroforaminal narrowing or spinal canal stenosis.  Impression: Degenerative change of the cervical spine with spinal canal and neuroforaminal narrowing as detailed above.  Moderate spinal canal narrowing noted at a few levels.  Moderate/severe neural foraminal narrowing noted at several locations.    Point of Service: [facility]    Electronically signed by: Samson Tenorio  Date:    2022  Time:    12:21         Past Medical History:   Diagnosis Date    Adenomatous polyp of descending colon 2021    Anxiety     Diverticula, colon 2021    GERD (gastroesophageal reflux disease)     History of cerebrovascular accident     Hyperlipidemia     Hypertension     Screening for malignant neoplasm of colon 2021     Past Surgical History:   Procedure Laterality Date    heart cath       Social History     Socioeconomic History    Marital status: Single   Tobacco Use    Smoking status: Former Smoker     Packs/day: 1.00     Years: 2.00     Pack years: 2.00     Types: Cigarettes     Start date:      Quit date:      Years since quittin.5    Smokeless tobacco: Never Used   Substance and Sexual Activity    Alcohol use: Never    Drug use: Never    Sexual activity: Not Currently     Family History   Problem  Relation Age of Onset    Heart disease Mother     Cancer Father     Cancer Sister      Review of patient's allergies indicates:  No Known Allergies      OBJECTIVE:  There were no vitals filed for this visit.  There were no vitals taken for this visit.  Physical Exam  Vitals and nursing note reviewed.   Constitutional:       General: He is not in acute distress.     Appearance: Normal appearance. He is not ill-appearing, toxic-appearing or diaphoretic.   HENT:      Head: Normocephalic and atraumatic.      Nose: Nose normal.      Mouth/Throat:      Mouth: Mucous membranes are moist.   Eyes:      Extraocular Movements: Extraocular movements intact.      Pupils: Pupils are equal, round, and reactive to light.   Cardiovascular:      Rate and Rhythm: Normal rate and regular rhythm.      Heart sounds: Normal heart sounds.   Pulmonary:      Effort: Pulmonary effort is normal. No respiratory distress.      Breath sounds: Normal breath sounds. No stridor. No wheezing or rhonchi.   Abdominal:      General: Bowel sounds are normal.      Palpations: Abdomen is soft.   Musculoskeletal:         General: No swelling or deformity.      Right shoulder: Tenderness and bony tenderness present. Decreased range of motion (Forward 40 extension). Decreased strength.      Cervical back: Tenderness present. No spasms. Pain with movement present. Decreased range of motion.      Thoracic back: Normal.      Lumbar back: No spasms, tenderness or bony tenderness. Normal range of motion. Negative right straight leg raise test and negative left straight leg raise test. No scoliosis.      Right lower leg: No edema.      Left lower leg: No edema.      Comments: Cervical pain with flexion, extension and lateral rotation.   Skin:     General: Skin is warm.   Neurological:      General: No focal deficit present.      Mental Status: He is alert and oriented to person, place, and time. Mental status is at baseline.      Cranial Nerves: No cranial nerve  deficit.      Sensory: Sensation is intact. No sensory deficit.      Motor: No weakness.      Coordination: Coordination normal.      Gait: Gait normal.      Deep Tendon Reflexes: Reflexes are normal and symmetric.   Psychiatric:         Mood and Affect: Mood normal.         Behavior: Behavior normal.            ASSESSMENT: 61 y.o. year old male with pain, consistent with     No diagnosis found.     PLAN:   1. reviewed  2..Addiction, Dependency, Tolerance, Opioid abuse-misuse, Death, Diversion Discussed. Overdose reversal drug Naloxone discussed  2.UDS point of care obtained for new patient evaluation and consultation. We will obtain a definitve UDS for confirmation.  3. Opioid contract signed today  4.Refill/ Continue medications for pain control and function       Requested Prescriptions      No prescriptions requested or ordered in this encounter     5. No orders of the defined types were placed in this encounter.       The total time spent for evaluation and management on 2022 including reviewing separately obtained history, performing a medically appropriate exam and evaluation, documenting clinical information in the health record, independently interpreting results and communicating them to the patient/family/caregiver, and ordering medications/tests/procedures was between {time based billin} minutes.    The above plan and management options were discussed at length with patient. Patient is in agreement with the above and verbalized understanding. It will be communicated with the referring physician via electronic record, fax, or mail.    Angelic Hdz  2022

## 2022-07-18 ENCOUNTER — HOSPITAL ENCOUNTER (OUTPATIENT)
Dept: RADIOLOGY | Facility: HOSPITAL | Age: 62
Discharge: HOME | End: 2022-07-18
Attending: PAIN MEDICINE
Payer: COMMERCIAL

## 2022-07-18 ENCOUNTER — OFFICE VISIT (OUTPATIENT)
Dept: PAIN MEDICINE | Facility: CLINIC | Age: 62
End: 2022-07-18
Payer: COMMERCIAL

## 2022-07-18 VITALS
HEART RATE: 71 BPM | BODY MASS INDEX: 25.07 KG/M2 | WEIGHT: 156 LBS | HEIGHT: 66 IN | SYSTOLIC BLOOD PRESSURE: 146 MMHG | DIASTOLIC BLOOD PRESSURE: 62 MMHG

## 2022-07-18 DIAGNOSIS — M25.511 ACUTE PAIN OF RIGHT SHOULDER: ICD-10-CM

## 2022-07-18 DIAGNOSIS — Z79.899 ENCOUNTER FOR LONG-TERM (CURRENT) USE OF OTHER MEDICATIONS: Primary | ICD-10-CM

## 2022-07-18 DIAGNOSIS — M48.02 NEURAL FORAMINAL STENOSIS OF CERVICAL SPINE: ICD-10-CM

## 2022-07-18 LAB

## 2022-07-18 PROCEDURE — 4010F ACE/ARB THERAPY RXD/TAKEN: CPT | Mod: CPTII,,, | Performed by: PAIN MEDICINE

## 2022-07-18 PROCEDURE — 3061F PR NEG MICROALBUMINURIA RESULT DOCUMENTED/REVIEW: ICD-10-PCS | Mod: CPTII,,, | Performed by: PAIN MEDICINE

## 2022-07-18 PROCEDURE — G0481 PR DRUG TEST DEF 8-14 CLASSES: ICD-10-PCS | Mod: ,,, | Performed by: CLINICAL MEDICAL LABORATORY

## 2022-07-18 PROCEDURE — 1159F PR MEDICATION LIST DOCUMENTED IN MEDICAL RECORD: ICD-10-PCS | Mod: CPTII,,, | Performed by: PAIN MEDICINE

## 2022-07-18 PROCEDURE — 99215 OFFICE O/P EST HI 40 MIN: CPT | Mod: PBBFAC | Performed by: PAIN MEDICINE

## 2022-07-18 PROCEDURE — 3077F PR MOST RECENT SYSTOLIC BLOOD PRESSURE >= 140 MM HG: ICD-10-PCS | Mod: CPTII,,, | Performed by: PAIN MEDICINE

## 2022-07-18 PROCEDURE — 3008F BODY MASS INDEX DOCD: CPT | Mod: CPTII,,, | Performed by: PAIN MEDICINE

## 2022-07-18 PROCEDURE — 3066F NEPHROPATHY DOC TX: CPT | Mod: CPTII,,, | Performed by: PAIN MEDICINE

## 2022-07-18 PROCEDURE — 80305 DRUG TEST PRSMV DIR OPT OBS: CPT | Mod: PBBFAC | Performed by: PAIN MEDICINE

## 2022-07-18 PROCEDURE — 4010F PR ACE/ARB THEARPY RXD/TAKEN: ICD-10-PCS | Mod: CPTII,,, | Performed by: PAIN MEDICINE

## 2022-07-18 PROCEDURE — 3078F PR MOST RECENT DIASTOLIC BLOOD PRESSURE < 80 MM HG: ICD-10-PCS | Mod: CPTII,,, | Performed by: PAIN MEDICINE

## 2022-07-18 PROCEDURE — G0481 DRUG TEST DEF 8-14 CLASSES: HCPCS | Mod: ,,, | Performed by: CLINICAL MEDICAL LABORATORY

## 2022-07-18 PROCEDURE — 3008F PR BODY MASS INDEX (BMI) DOCUMENTED: ICD-10-PCS | Mod: CPTII,,, | Performed by: PAIN MEDICINE

## 2022-07-18 PROCEDURE — 73030 XR SHOULDER COMPLETE 2 OR MORE VIEWS RIGHT: ICD-10-PCS | Mod: 26,RT,, | Performed by: RADIOLOGY

## 2022-07-18 PROCEDURE — 3066F PR DOCUMENTATION OF TREATMENT FOR NEPHROPATHY: ICD-10-PCS | Mod: CPTII,,, | Performed by: PAIN MEDICINE

## 2022-07-18 PROCEDURE — 99213 OFFICE O/P EST LOW 20 MIN: CPT | Mod: S$PBB,,, | Performed by: PAIN MEDICINE

## 2022-07-18 PROCEDURE — 3077F SYST BP >= 140 MM HG: CPT | Mod: CPTII,,, | Performed by: PAIN MEDICINE

## 2022-07-18 PROCEDURE — 3078F DIAST BP <80 MM HG: CPT | Mod: CPTII,,, | Performed by: PAIN MEDICINE

## 2022-07-18 PROCEDURE — 99213 PR OFFICE/OUTPT VISIT, EST, LEVL III, 20-29 MIN: ICD-10-PCS | Mod: S$PBB,,, | Performed by: PAIN MEDICINE

## 2022-07-18 PROCEDURE — 73030 X-RAY EXAM OF SHOULDER: CPT | Mod: TC,RT

## 2022-07-18 PROCEDURE — 73030 X-RAY EXAM OF SHOULDER: CPT | Mod: 26,RT,, | Performed by: RADIOLOGY

## 2022-07-18 PROCEDURE — 1159F MED LIST DOCD IN RCRD: CPT | Mod: CPTII,,, | Performed by: PAIN MEDICINE

## 2022-07-18 PROCEDURE — 3061F NEG MICROALBUMINURIA REV: CPT | Mod: CPTII,,, | Performed by: PAIN MEDICINE

## 2022-07-18 RX ORDER — HYDROCODONE BITARTRATE AND ACETAMINOPHEN 10; 325 MG/1; MG/1
1 TABLET ORAL EVERY 12 HOURS PRN
Qty: 30 TABLET | Refills: 0 | Status: SHIPPED | OUTPATIENT
Start: 2022-07-18 | End: 2022-09-29 | Stop reason: SDUPTHER

## 2022-07-18 NOTE — PROGRESS NOTES
Chronic Pain - New Consult    Referring Physician: Angelic Hdz MD       SUBJECTIVE: Disclaimer: This note has been generated using voice-recognition software. There may be typographical errors that have been missed during proof-reading      Initial encounter:    Amador Callahan presents to the clinic for the evaluation of right neck and shoulder pain pain.       61-year-old male presents for new patient evaluation and consultation from Kaya Go,  nurse practitioner.  Patient reports an acute onset of right neck and shoulder pain 1 month ago.  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  He started physical therapy 1 month ago,  but was unable to complete due to pain and discomfort.  Patient reports  decreased range of motion of the right arm and shoulder and  inability to forward extend. Medications are providing ineffective pain relief.  He received an MRI of the cervical spine which revealed multilevel degenerative changes and neural foraminal narrowing , moderate to severe at multiple levels.  He has not received an MRI of the right shoulder to rule out a rotator cuff tear or injury.  Based on clinical findings, I feel that his pain is probably related to the shoulder and less involvement from the cervical spine. I will order an MRI of the right shoulder for possible rotator cuff injury  and pending results,  I will refer to Orthopedic for further evaluation.      Neck Pain   Associated symptoms include weakness (Right upper extremity).     Pain Assessment  Pain Score:   9  Pain Location: Neck  Pain Orientation: Right  Pain Radiating Towards: radiates down right arm to hand  Pain Descriptors: Sharp, Constant  Pain Frequency: Continuous  Pain Onset: Awakened from sleep  Clinical Progression: Gradually worsening  Aggravating Factors: Standing, Walking, Other (Comment) (lying and sitting)      Physical Therapy/Home Exercise: yes        Pain  "Medications:  has a current medication list which includes the following prescription(s): atorvastatin, buspirone, cefprozil, cyclobenzaprine, lisinopril, omeprazole, amoxicillin-clavulanate 500-125mg, ciprofloxacin-hydrocortisone 0.2-1%, hydrocodone-acetaminophen, and neomycin-polymyxin-hydrocortisone.      Tried in Past:  NSAIDS-yes  TCA-no  SNRI-no  Anti-convulsants-no  Muscle Relaxants-yes  Opioids-no  Benzodiazepines-no     4A"s of Opioid Risk Assessment  Activity: Patient is unable to perform  ADL  Analgesia:  Patient's pain is partially controlled by current medication.   Aberrant Behavior:  reviewed with no aberrant drug seeking/taking behavior     report:  Reviewed and consistent with medication use as prescribed.    Patient denies suicidal or homicidal ideations    Pain interventional therapy-no    Chiropractor -no  Acupuncture - no  TENS unit -no  Spinal decompression -no  Joint replacement -no     Review of Systems   Constitutional: Negative.    HENT: Negative.    Eyes: Negative.    Respiratory: Negative.    Cardiovascular: Negative.    Gastrointestinal: Negative.    Endocrine: Negative.    Genitourinary: Negative.    Musculoskeletal: Positive for arthralgias (Right shoulder), neck pain and neck stiffness.   Integumentary:  Negative.   Allergic/Immunologic: Negative.    Neurological: Positive for weakness (Right upper extremity).   Hematological: Negative.    Psychiatric/Behavioral: Negative.              MRI Cervical Spine Without Contrast  Narrative: EXAMINATION:  MRI CERVICAL SPINE WITHOUT CONTRAST    CLINICAL HISTORY:  Radiculopathy, cervical regionCervical radiculopathy, no red flags;cervical radiculopathy;    COMPARISON:  None    TECHNIQUE:  Multiplanar MRI imaging of the cervical spine was obtained without the use of intravenous contrast.    FINDINGS:  There is straightening of normal cervical lordosis which may be positional or secondary to muscle spasm. There is no significant " anterolisthesis or retrolisthesis.  Multilevel mild marginal vertebral body osteophyte formation.  Multilevel disc desiccation.    Disc levels:    C2/C3: No significant disc bulge, neuroforaminal narrowing or spinal canal stenosis.    C3/C4: Small posterior disc osteophyte complex formation with moderate spinal canal narrowing.  Posterior facet and uncovertebral joint hypertrophy result in mild right and mild left neuroforaminal narrowing.    C4/C5: Small posterior disc osteophyte complex formation with mild spinal canal narrowing.  Posterior facet and uncovertebral joint hypertrophy result in moderate to severe right and mild left neuroforaminal narrowing.    C5/C6: Moderate loss of disc space height.  Posterior disc osteophyte complex with moderate spinal canal narrowing. Posterior facet and uncovertebral joint hypertrophy result in moderate to severe right and moderate to severe left neuroforaminal narrowing.    C6/C7: Small posterior disc osteophyte complex formation with mild spinal canal narrowing.  Posterior facet and uncovertebral joint hypertrophy result in mild to moderate right and moderate to severe left neuroforaminal narrowing.    C7/T1: No significant disc bulge, neuroforaminal narrowing or spinal canal stenosis.  Impression: Degenerative change of the cervical spine with spinal canal and neuroforaminal narrowing as detailed above.  Moderate spinal canal narrowing noted at a few levels.  Moderate/severe neural foraminal narrowing noted at several locations.    Point of Service: [facility]    Electronically signed by: Samson Tenorio  Date:    06/01/2022  Time:    12:21         Past Medical History:   Diagnosis Date    Adenomatous polyp of descending colon 5/17/2021    Anxiety     Diverticula, colon 5/17/2021    GERD (gastroesophageal reflux disease)     History of cerebrovascular accident     Hyperlipidemia     Hypertension     Screening for malignant neoplasm of colon 5/17/2021  "    Past Surgical History:   Procedure Laterality Date    heart cath       Social History     Socioeconomic History    Marital status: Single   Tobacco Use    Smoking status: Former Smoker     Packs/day: 1.00     Years: 2.00     Pack years: 2.00     Types: Cigarettes     Start date:      Quit date:      Years since quittin.5    Smokeless tobacco: Never Used   Substance and Sexual Activity    Alcohol use: Never    Drug use: Never    Sexual activity: Not Currently     Family History   Problem Relation Age of Onset    Heart disease Mother     Cancer Father     Cancer Sister      Review of patient's allergies indicates:  No Known Allergies      OBJECTIVE:  Vitals:    22 1013   BP: (!) 146/62   Pulse: 71     BP (!) 146/62   Pulse 71   Ht 5' 6" (1.676 m)   Wt 70.8 kg (156 lb)   BMI 25.18 kg/m²   Physical Exam  Vitals and nursing note reviewed.   Constitutional:       General: He is not in acute distress.     Appearance: Normal appearance. He is not ill-appearing, toxic-appearing or diaphoretic.   HENT:      Head: Normocephalic and atraumatic.      Nose: Nose normal.      Mouth/Throat:      Mouth: Mucous membranes are moist.   Eyes:      Extraocular Movements: Extraocular movements intact.      Pupils: Pupils are equal, round, and reactive to light.   Cardiovascular:      Rate and Rhythm: Normal rate and regular rhythm.      Heart sounds: Normal heart sounds.   Pulmonary:      Effort: Pulmonary effort is normal. No respiratory distress.      Breath sounds: Normal breath sounds. No stridor. No wheezing or rhonchi.   Abdominal:      General: Bowel sounds are normal.      Palpations: Abdomen is soft.   Musculoskeletal:         General: No swelling or deformity.      Right shoulder: Tenderness and bony tenderness present. Decreased range of motion (Forward 40 extension). Decreased strength.      Cervical back: Tenderness present. No spasms. Pain with movement present. Decreased range of motion. "      Thoracic back: Normal.      Lumbar back: No spasms, tenderness or bony tenderness. Normal range of motion. Negative right straight leg raise test and negative left straight leg raise test. No scoliosis.      Right lower leg: No edema.      Left lower leg: No edema.      Comments: Cervical pain with flexion, extension and lateral rotation.   Skin:     General: Skin is warm.   Neurological:      General: No focal deficit present.      Mental Status: He is alert and oriented to person, place, and time. Mental status is at baseline.      Cranial Nerves: No cranial nerve deficit.      Sensory: Sensation is intact. No sensory deficit.      Motor: No weakness.      Coordination: Coordination normal.      Gait: Gait normal.      Deep Tendon Reflexes: Reflexes are normal and symmetric.   Psychiatric:         Mood and Affect: Mood normal.         Behavior: Behavior normal.            ASSESSMENT: 61 y.o. year old male with pain, consistent with     Encounter Diagnoses   Name Primary?    Neural foraminal stenosis of cervical spine     Encounter for long-term (current) use of other medications Yes    Acute pain of right shoulder         PLAN:   1. reviewed  2..Addiction, Dependency, Tolerance, Opioid abuse-misuse, Death, Diversion Discussed. Overdose reversal drug Naloxone discussed  2.UDS point of care obtained for new patient evaluation and consultation. We will [facility] for confirmation.  3. Opioid contract signed today  4.Refill/ Continue medications for pain control and function.  Start Norco 1-2 tablets a day for severe shoulder pain and discomfort       Requested Prescriptions     Signed Prescriptions Disp Refills    HYDROcodone-acetaminophen (NORCO)  mg per tablet 30 tablet 0     Sig: Take 1 tablet by mouth every 12 (twelve) hours as needed for Pain.     5. Obtain MRI of the right shoulder to rule out rotator cuff tear  6. Obtain x-ray of the right shoulder prior to MRI  7.Urine drug  screen and confirmation testing was ordered as documented on the requisition form in order to verify medication compliance, test for illicit substances.    Orders Placed This Encounter   Procedures    MRI Shoulder W WO Contrast Right     Standing Status:   Future     Standing Expiration Date:   7/18/2023     Order Specific Question:   Does the patient have a pacemaker or a defibrilator (Note: Some facilities may not be able to schedule an MRI for patients with pacemakers and defibrillators. You should contact your local radiology department to determine if this is the case.)?     Answer:   No     Order Specific Question:   Does the patient have an aneurysm or surgical clip, pump, nerve/brain stimulator, middle/inner ear prosthesis, or other metal implant or foreign object (bullet, shrapnel)? If they have a card related to their implant, ask them to bring it. Issues related to the implant may cause the MRI to be delayed.     Answer:   No     Order Specific Question:   Is the patient claustrophobic?     Answer:   No     Order Specific Question:   Will the patient require sedation?     Answer:   No     Order Specific Question:   Does the patient have any of the following conditions? Diabetes, History of Renal Disease or Hypertension requiring medical therapy?     Answer:   Yes     Order Specific Question:   May the Radiologist modify the order per protocol to meet the clinical needs of the patient?     Answer:   Yes     Order Specific Question:   Is this part of a Research Study?     Answer:   No     Order Specific Question:   Does the patient have on a skin patch for medication with aluminized backing?     Answer:   No    X-ray Shoulder 2 or More Views Right     Standing Status:   Future     Standing Expiration Date:   7/18/2023     Order Specific Question:   May the Radiologist modify the order per protocol to meet the clinical needs of the patient?     Answer:   Yes     Order Specific Question:   Release to  patient     Answer:   Immediate    Drug Screen Definitive 14, Urine     Standing Status:   Future     Standing Expiration Date:   9/16/2023     Order Specific Question:   Specimen Source     Answer:   Urine    POCT Urine Drug Screen Presump     Interpretive Information:     Negative:  No drug detected at the cut off level.   Positive:  This result represents presumptive positive for the   tested drug, other substances may yield a positive response other   than the analyte of interest. This result should be utilized for   diagnostic purpose only. Confirmation testing will be performed upon physician request only.         8. Return after MRI for re-evaluation  9. Pending MRI results I will consider nerve block injections versus orthopedic evaluation    The total time spent for evaluation and management on 07/18/2022 including reviewing separately obtained history, performing a medically appropriate exam and evaluation, documenting clinical information in the health record, independently interpreting results and communicating them to the patient/family/caregiver, and ordering medications/tests/procedures was between 15-29 minutes.    The above plan and management options were discussed at length with patient. Patient is in agreement with the above and verbalized understanding. It will be communicated with the referring physician via electronic record, fax, or mail.    Angelic Hdz  07/18/2022

## 2022-07-20 LAB
6-ACETYLMORPHINE, URINE (RUSH): NEGATIVE 10 NG/ML
7-AMINOCLONAZEPAM, URINE (RUSH): NEGATIVE 25 NG/ML
A-HYDROXYALPRAZOLAM, URINE (RUSH): NEGATIVE 25 NG/ML
ACETYL FENTANYL, URINE (RUSH): NEGATIVE 2.5 NG/ML
ACETYL NORFENTANYL OXALATE, URINE (RUSH): NEGATIVE 5 NG/ML
AMPHET UR QL SCN: NEGATIVE
BENZOYLECGONINE, URINE (RUSH): NEGATIVE 100 NG/ML
BUPRENORPHINE UR QL SCN: NEGATIVE 25 NG/ML
CODEINE, URINE (RUSH): NEGATIVE 25 NG/ML
CREAT UR-MCNC: 83 MG/DL (ref 39–259)
EDDP, URINE (RUSH): NEGATIVE 25 NG/ML
FENTANYL, URINE (RUSH): NEGATIVE 2.5 NG/ML
HYDROCODONE, URINE (RUSH): NEGATIVE 25 NG/ML
HYDROMORPHONE, URINE (RUSH): NEGATIVE 25 NG/ML
LORAZEPAM, URINE (RUSH): NEGATIVE 25 NG/ML
METHADONE UR QL SCN: NEGATIVE 25 NG/ML
METHAMPHET UR QL SCN: NEGATIVE
MORPHINE, URINE (RUSH): NEGATIVE 25 NG/ML
NORBUPRENORPHINE, URINE (RUSH): NEGATIVE 25 NG/ML
NORDIAZEPAM, URINE (RUSH): NEGATIVE 25 NG/ML
NORFENTANYL OXALATE, URINE (RUSH): NEGATIVE 5 NG/ML
NORHYDROCODONE, URINE (RUSH): NEGATIVE 50 NG/ML
NOROXYCODONE HCL, URINE (RUSH): NEGATIVE 50 NG/ML
OXAZEPAM, URINE (RUSH): NEGATIVE 25 NG/ML
OXYCODONE UR QL SCN: NEGATIVE 25 NG/ML
OXYMORPHONE, URINE (RUSH): NEGATIVE 25 NG/ML
PH UR STRIP: 6 PH UNITS
SP GR UR STRIP: 1.01
TAPENTADOL, URINE (RUSH): NEGATIVE 25 NG/ML
TEMAZEPAM, URINE (RUSH): NEGATIVE 25 NG/ML
THC-COOH, URINE (RUSH): NEGATIVE 25 NG/ML
TRAMADOL, URINE (RUSH): NEGATIVE 100 NG/ML

## 2022-08-03 ENCOUNTER — OFFICE VISIT (OUTPATIENT)
Dept: PAIN MEDICINE | Facility: CLINIC | Age: 62
End: 2022-08-03
Payer: COMMERCIAL

## 2022-08-03 VITALS
WEIGHT: 157.19 LBS | DIASTOLIC BLOOD PRESSURE: 59 MMHG | HEART RATE: 88 BPM | SYSTOLIC BLOOD PRESSURE: 141 MMHG | HEIGHT: 66 IN | BODY MASS INDEX: 25.26 KG/M2

## 2022-08-03 DIAGNOSIS — M54.2 CERVICALGIA: ICD-10-CM

## 2022-08-03 DIAGNOSIS — M75.101 TEAR OF RIGHT ROTATOR CUFF, UNSPECIFIED TEAR EXTENT, UNSPECIFIED WHETHER TRAUMATIC: Primary | ICD-10-CM

## 2022-08-03 DIAGNOSIS — M25.511 ACUTE PAIN OF RIGHT SHOULDER: ICD-10-CM

## 2022-08-03 PROCEDURE — 3061F NEG MICROALBUMINURIA REV: CPT | Mod: CPTII,,, | Performed by: PAIN MEDICINE

## 2022-08-03 PROCEDURE — 4010F PR ACE/ARB THEARPY RXD/TAKEN: ICD-10-PCS | Mod: CPTII,,, | Performed by: PAIN MEDICINE

## 2022-08-03 PROCEDURE — 3078F PR MOST RECENT DIASTOLIC BLOOD PRESSURE < 80 MM HG: ICD-10-PCS | Mod: CPTII,,, | Performed by: PAIN MEDICINE

## 2022-08-03 PROCEDURE — 3077F SYST BP >= 140 MM HG: CPT | Mod: CPTII,,, | Performed by: PAIN MEDICINE

## 2022-08-03 PROCEDURE — 3066F NEPHROPATHY DOC TX: CPT | Mod: CPTII,,, | Performed by: PAIN MEDICINE

## 2022-08-03 PROCEDURE — 1159F PR MEDICATION LIST DOCUMENTED IN MEDICAL RECORD: ICD-10-PCS | Mod: CPTII,,, | Performed by: PAIN MEDICINE

## 2022-08-03 PROCEDURE — 1159F MED LIST DOCD IN RCRD: CPT | Mod: CPTII,,, | Performed by: PAIN MEDICINE

## 2022-08-03 PROCEDURE — 3008F BODY MASS INDEX DOCD: CPT | Mod: CPTII,,, | Performed by: PAIN MEDICINE

## 2022-08-03 PROCEDURE — 3066F PR DOCUMENTATION OF TREATMENT FOR NEPHROPATHY: ICD-10-PCS | Mod: CPTII,,, | Performed by: PAIN MEDICINE

## 2022-08-03 PROCEDURE — 99213 OFFICE O/P EST LOW 20 MIN: CPT | Mod: S$PBB,,, | Performed by: PAIN MEDICINE

## 2022-08-03 PROCEDURE — 3077F PR MOST RECENT SYSTOLIC BLOOD PRESSURE >= 140 MM HG: ICD-10-PCS | Mod: CPTII,,, | Performed by: PAIN MEDICINE

## 2022-08-03 PROCEDURE — 4010F ACE/ARB THERAPY RXD/TAKEN: CPT | Mod: CPTII,,, | Performed by: PAIN MEDICINE

## 2022-08-03 PROCEDURE — 3061F PR NEG MICROALBUMINURIA RESULT DOCUMENTED/REVIEW: ICD-10-PCS | Mod: CPTII,,, | Performed by: PAIN MEDICINE

## 2022-08-03 PROCEDURE — 99215 OFFICE O/P EST HI 40 MIN: CPT | Mod: PBBFAC | Performed by: PAIN MEDICINE

## 2022-08-03 PROCEDURE — 3008F PR BODY MASS INDEX (BMI) DOCUMENTED: ICD-10-PCS | Mod: CPTII,,, | Performed by: PAIN MEDICINE

## 2022-08-03 PROCEDURE — 3078F DIAST BP <80 MM HG: CPT | Mod: CPTII,,, | Performed by: PAIN MEDICINE

## 2022-08-03 PROCEDURE — 99213 PR OFFICE/OUTPT VISIT, EST, LEVL III, 20-29 MIN: ICD-10-PCS | Mod: S$PBB,,, | Performed by: PAIN MEDICINE

## 2022-08-03 RX ORDER — NAPROXEN 500 MG/1
500 TABLET ORAL 2 TIMES DAILY
Qty: 60 TABLET | Refills: 0 | Status: SHIPPED | OUTPATIENT
Start: 2022-08-03 | End: 2022-08-31 | Stop reason: SDUPTHER

## 2022-08-03 RX ORDER — HYDROCODONE BITARTRATE AND ACETAMINOPHEN 5; 325 MG/1; MG/1
1 TABLET ORAL EVERY 12 HOURS PRN
Qty: 60 TABLET | Refills: 0 | Status: SHIPPED | OUTPATIENT
Start: 2022-08-03 | End: 2022-08-04 | Stop reason: SDUPTHER

## 2022-08-03 NOTE — PROGRESS NOTES
She Disclaimer: This note has been generated using voice-recognition software. There may be typographical errors that have been missed during proof-reading        Patient ID: Amador Callahan is a 61 y.o. male.      Chief Complaint: Shoulder Pain      61-year-old male returns following MRI of the right shoulder.  Results were reviewed and discussed with patient.  He was unable to complete physical therapy at [facility]  due to pain and discomfort.  He continues to experience decreased range of motion and inability to forward extend.  Norco is creating lethargy but without significant improvement of his pain.  Orthopedic consultation was discussed for right rotator cuff tear.              Pain Assessment  Pain Score:   9  Pain Location: Shoulder  Pain Orientation: Right  Pain Descriptors: Constant, Dull  Pain Frequency: Continuous  Pain Onset: Awakened from sleep  Clinical Progression: Not changed  Aggravating Factors: Standing, Other (Comment), Walking (sitting and lying)      A's of Opioid Risk Assessment  Activity:Patient can not perform ADL.   Analgesia:Patients pain is not controlled by current medication.   Adverse Effects: Patient denies constipation or sedation.  Aberrant Behavior:  reviewed with no aberrant drug seeking/taking behavior.      Patient denies any suicidal or homicidal ideations    Physical Therapy/Home Exercise: yes      MRI Shoulder Without Contrast Right  Narrative: EXAMINATION:  MRI SHOULDER WITHOUT CONTRAST RIGHT    CLINICAL HISTORY:  Pain in right shoulderRIGHT SHOULDER PAIN;    COMPARISON:  Right shoulder x-ray July 18 2022    TECHNIQUE:  Magnetic resonance imaging of the right shoulder was performed without the use of intravenous contrast.    FINDINGS:  Acromion and acromioclavicular joint: There are mild degenerative changes in the acromioclavicular joint. The acromial undersurface is essentially flat.    Subacromial space: There is no evidence of significant abnormal   fluid in the  subacromial/subdeltoid bursa.    Rotator cuff: Low-grade bursal surface tearing of the supraspinatus and infraspinatus tendons.    Tendon of the long head of the biceps: There is increased signal within the intra-articular portion of the long head of biceps tendon extending to the biceps labral complex consistent with tendinopathy.    Labrum and labral anchor: There is irregularity of the posterosuperior labrum which may reflect sequela of chronic SLAP tear.    Osseous structures: The bone marrow signal is grossly unremarkable.    Glenohumeral Joint: There is no evidence of significant glenohumeral joint effusion.  Mild degenerative change of the glenohumeral joint with some cartilage loss.  Impression: Low-grade bursal surface tearing of the supraspinatus and infraspinatus tendons.    Mild degenerative change of the acromioclavicular and glenohumeral joints.    There is increased signal within the intra-articular portion of the long head of biceps tendon extending to the biceps labral complex consistent with tendinopathy.There is irregularity of the posterosuperior labrum which may reflect sequela of chronic SLAP tear.    Point of Service: [facility]    Electronically signed by: Samson Tenorio  Date:    08/03/2022  Time:    10:41      Review of Systems   Constitutional: Negative.    HENT: Negative.    Eyes: Negative.    Respiratory: Negative.    Cardiovascular: Negative.    Gastrointestinal: Negative.    Endocrine: Negative.    Genitourinary: Negative.    Musculoskeletal: Positive for arthralgias (Right shoulder), neck pain and neck stiffness.   Integumentary:  Negative.   Allergic/Immunologic: Negative.    Neurological: Negative.    Hematological: Negative.    Psychiatric/Behavioral: Positive for sleep disturbance.             Past Medical History:   Diagnosis Date    Adenomatous polyp of descending colon 5/17/2021    Anxiety     Diverticula, colon 5/17/2021    GERD (gastroesophageal reflux  disease)     History of cerebrovascular accident     Hyperlipidemia     Hypertension     Screening for malignant neoplasm of colon 2021     Past Surgical History:   Procedure Laterality Date    heart cath       Social History     Socioeconomic History    Marital status: Single   Tobacco Use    Smoking status: Former Smoker     Packs/day: 1.00     Years: 2.00     Pack years: 2.00     Types: Cigarettes     Start date:      Quit date:      Years since quittin.6    Smokeless tobacco: Never Used   Substance and Sexual Activity    Alcohol use: Never    Drug use: Never    Sexual activity: Not Currently     Family History   Problem Relation Age of Onset    Heart disease Mother     Cancer Father     Cancer Sister      Review of patient's allergies indicates:  No Known Allergies  has a current medication list which includes the following prescription(s): atorvastatin, buspirone, cefprozil, cyclobenzaprine, lisinopril, omeprazole, amoxicillin-clavulanate 500-125mg, ciprofloxacin-hydrocortisone 0.2-1%, hydrocodone-acetaminophen, naproxen, and neomycin-polymyxin-hydrocortisone.      Objective:  Vitals:    22 1155   BP: (!) 141/59   Pulse: 88        Physical Exam  Vitals and nursing note reviewed.   Constitutional:       General: He is not in acute distress.     Appearance: Normal appearance. He is not ill-appearing, toxic-appearing or diaphoretic.   HENT:      Head: Normocephalic and atraumatic.      Nose: Nose normal.      Mouth/Throat:      Mouth: Mucous membranes are moist.   Eyes:      Extraocular Movements: Extraocular movements intact.      Pupils: Pupils are equal, round, and reactive to light.   Cardiovascular:      Rate and Rhythm: Normal rate and regular rhythm.      Heart sounds: Normal heart sounds.   Pulmonary:      Effort: Pulmonary effort is normal. No respiratory distress.      Breath sounds: Normal breath sounds. No stridor. No wheezing or rhonchi.   Abdominal:      General:  Bowel sounds are normal.      Palpations: Abdomen is soft.   Musculoskeletal:         General: No swelling or deformity.      Right shoulder: Tenderness and bony tenderness present. Decreased range of motion. Decreased strength.      Cervical back: Normal and normal range of motion. No spasms or tenderness. No pain with movement. Normal range of motion.      Thoracic back: Normal.      Lumbar back: Tenderness present. No spasms or bony tenderness. Decreased range of motion. Negative right straight leg raise test and negative left straight leg raise test. No scoliosis.      Right lower leg: No edema.      Left lower leg: No edema.   Skin:     General: Skin is warm.   Neurological:      General: No focal deficit present.      Mental Status: He is alert and oriented to person, place, and time. Mental status is at baseline.      Cranial Nerves: No cranial nerve deficit.      Sensory: Sensation is intact. No sensory deficit.      Motor: No weakness.      Coordination: Coordination normal.      Gait: Gait normal.      Deep Tendon Reflexes: Reflexes are normal and symmetric.   Psychiatric:         Mood and Affect: Mood normal.         Behavior: Behavior normal.           Assessment:      1. Tear of right rotator cuff, unspecified tear extent, unspecified whether traumatic    2. Acute pain of right shoulder    3. Cervicalgia          Plan:  1. reviewed  2.Addiction, Dependency, Tolerance, Opioid abuse-misuse, Death, Diversion Discussed. Overdose reversal drug Naloxone discussed.  3.Refill/Continue medications for pain control and function       Requested Prescriptions     Signed Prescriptions Disp Refills    HYDROcodone-acetaminophen (NORCO) 5-325 mg per tablet 60 tablet 0     Sig: Take 1 tablet by mouth every 12 (twelve) hours as needed for Pain.    naproxen (NAPROSYN) 500 MG tablet 60 tablet 0     Sig: Take 1 tablet (500 mg total) by mouth 2 (two) times daily.     4.Orthopedic consultation for right rotator cuff  tear  Orders Placed This Encounter   Procedures    Ambulatory referral/consult to Orthopedics     Standing Status:   Future     Standing Expiration Date:   9/3/2023     Referral Priority:   Routine     Referral Type:   Consultation     Requested Specialty:   Orthopedic Surgery     Number of Visits Requested:   1      5.Return in 1 month with me  for re-evaluation and medication refill       report:  Reviewed and consistent with medication use as prescribed.      The total time spent for evaluation and management on 08/03/2022 including reviewing separately obtained history, performing a medically appropriate exam and evaluation, documenting clinical information in the health record, independently interpreting results and communicating them to the patient/family/caregiver, and ordering medications/tests/procedures was between 15-29 minutes.    The above plan and management options were discussed at length with patient. Patient is in agreement with the above and verbalized understanding. It will be communicated with the referring physician via electronic record, fax, or mail.

## 2022-08-04 ENCOUNTER — TELEPHONE (OUTPATIENT)
Dept: PAIN MEDICINE | Facility: CLINIC | Age: 62
End: 2022-08-04
Payer: COMMERCIAL

## 2022-08-04 NOTE — TELEPHONE ENCOUNTER
----- Message from Jelena Reynoso sent at 8/3/2022  3:00 PM CDT -----  Regarding: [facility]  Patient states insurance would only pay for 7 pain pills. Please call pt. @ [phone]

## 2022-08-04 NOTE — TELEPHONE ENCOUNTER
Norco 5mg #14 was filled 8-3-2022 per .  Pt needs a new prescription for Norco for the remaining 46 tablets.

## 2022-08-04 NOTE — TELEPHONE ENCOUNTER
Pt states his insurance would only pay for 7 day supply of his Norco and he did not have the money to purchase the rest of the medication.  Pt states he had to pay for cash last month on the Norco.  Pt informed I would send a message to Mariana to do a PA on his Norco, voiced understanding, message was sent to Mariana.

## 2022-08-04 NOTE — TELEPHONE ENCOUNTER
----- Message from Mariana Carpenter sent at 8/4/2022  2:22 PM CDT -----  [employer] approved PA but patient already received the 7 day supply. Insurance states pt needs a new refill to cover the other 46 pills needs to be picked up this month.      ----- Message -----  From: Annmarie Parks RN  Sent: 8/4/2022  11:45 AM CDT  To: Mariana Carpenter    Pt states he had to pay cash for his Norco last month and his insurance would only give him a 7 day supply this time and he was not able to pay for the remaining.  Can you please do a PA on his Norco?

## 2022-08-08 RX ORDER — HYDROCODONE BITARTRATE AND ACETAMINOPHEN 5; 325 MG/1; MG/1
1 TABLET ORAL EVERY 12 HOURS PRN
Qty: 46 TABLET | Refills: 0 | Status: SHIPPED | OUTPATIENT
Start: 2022-08-10 | End: 2022-09-09

## 2022-08-09 NOTE — TELEPHONE ENCOUNTER
Pt notified and made aware of Dr Hdz sent in the remaining 46 tablets of his Norco and PA was approved, voiced understanding.

## 2022-08-31 ENCOUNTER — OFFICE VISIT (OUTPATIENT)
Dept: PAIN MEDICINE | Facility: CLINIC | Age: 62
End: 2022-08-31
Payer: COMMERCIAL

## 2022-08-31 VITALS
DIASTOLIC BLOOD PRESSURE: 74 MMHG | BODY MASS INDEX: 25.49 KG/M2 | HEART RATE: 89 BPM | HEIGHT: 65 IN | WEIGHT: 153 LBS | SYSTOLIC BLOOD PRESSURE: 138 MMHG

## 2022-08-31 DIAGNOSIS — M75.101 TEAR OF RIGHT ROTATOR CUFF, UNSPECIFIED TEAR EXTENT, UNSPECIFIED WHETHER TRAUMATIC: Primary | Chronic | ICD-10-CM

## 2022-08-31 DIAGNOSIS — M54.2 CERVICALGIA: Chronic | ICD-10-CM

## 2022-08-31 PROCEDURE — 1159F MED LIST DOCD IN RCRD: CPT | Mod: CPTII,,, | Performed by: PAIN MEDICINE

## 2022-08-31 PROCEDURE — 99214 PR OFFICE/OUTPT VISIT, EST, LEVL IV, 30-39 MIN: ICD-10-PCS | Mod: S$PBB,,, | Performed by: PAIN MEDICINE

## 2022-08-31 PROCEDURE — 3066F NEPHROPATHY DOC TX: CPT | Mod: CPTII,,, | Performed by: PAIN MEDICINE

## 2022-08-31 PROCEDURE — 3008F BODY MASS INDEX DOCD: CPT | Mod: CPTII,,, | Performed by: PAIN MEDICINE

## 2022-08-31 PROCEDURE — 3078F PR MOST RECENT DIASTOLIC BLOOD PRESSURE < 80 MM HG: ICD-10-PCS | Mod: CPTII,,, | Performed by: PAIN MEDICINE

## 2022-08-31 PROCEDURE — 4010F PR ACE/ARB THEARPY RXD/TAKEN: ICD-10-PCS | Mod: CPTII,,, | Performed by: PAIN MEDICINE

## 2022-08-31 PROCEDURE — 3078F DIAST BP <80 MM HG: CPT | Mod: CPTII,,, | Performed by: PAIN MEDICINE

## 2022-08-31 PROCEDURE — 3066F PR DOCUMENTATION OF TREATMENT FOR NEPHROPATHY: ICD-10-PCS | Mod: CPTII,,, | Performed by: PAIN MEDICINE

## 2022-08-31 PROCEDURE — 99214 OFFICE O/P EST MOD 30 MIN: CPT | Mod: S$PBB,,, | Performed by: PAIN MEDICINE

## 2022-08-31 PROCEDURE — 99214 OFFICE O/P EST MOD 30 MIN: CPT | Mod: PBBFAC | Performed by: PAIN MEDICINE

## 2022-08-31 PROCEDURE — 3061F PR NEG MICROALBUMINURIA RESULT DOCUMENTED/REVIEW: ICD-10-PCS | Mod: CPTII,,, | Performed by: PAIN MEDICINE

## 2022-08-31 PROCEDURE — 3061F NEG MICROALBUMINURIA REV: CPT | Mod: CPTII,,, | Performed by: PAIN MEDICINE

## 2022-08-31 PROCEDURE — 3075F PR MOST RECENT SYSTOLIC BLOOD PRESS GE 130-139MM HG: ICD-10-PCS | Mod: CPTII,,, | Performed by: PAIN MEDICINE

## 2022-08-31 PROCEDURE — 1159F PR MEDICATION LIST DOCUMENTED IN MEDICAL RECORD: ICD-10-PCS | Mod: CPTII,,, | Performed by: PAIN MEDICINE

## 2022-08-31 PROCEDURE — 4010F ACE/ARB THERAPY RXD/TAKEN: CPT | Mod: CPTII,,, | Performed by: PAIN MEDICINE

## 2022-08-31 PROCEDURE — 3008F PR BODY MASS INDEX (BMI) DOCUMENTED: ICD-10-PCS | Mod: CPTII,,, | Performed by: PAIN MEDICINE

## 2022-08-31 PROCEDURE — 3075F SYST BP GE 130 - 139MM HG: CPT | Mod: CPTII,,, | Performed by: PAIN MEDICINE

## 2022-08-31 RX ORDER — NAPROXEN 500 MG/1
500 TABLET ORAL 2 TIMES DAILY
Qty: 60 TABLET | Refills: 0 | Status: SHIPPED | OUTPATIENT
Start: 2022-09-02 | End: 2022-10-02

## 2022-08-31 RX ORDER — HYDROCODONE BITARTRATE AND ACETAMINOPHEN 5; 325 MG/1; MG/1
1 TABLET ORAL EVERY 12 HOURS PRN
Qty: 60 TABLET | Refills: 0 | Status: SHIPPED | OUTPATIENT
Start: 2022-09-02 | End: 2022-09-11 | Stop reason: SDUPTHER

## 2022-08-31 NOTE — PROGRESS NOTES
She Disclaimer: This note has been generated using voice-recognition software. There may be typographical errors that have been missed during proof-reading        Patient ID: Amador Callahan is a 61 y.o. male.      Chief Complaint: Shoulder Pain (Right)      61-year-old male returns for re-evaluation of right shoulder pain.  He was evaluated by Dr. Sheehan August 9, 2022 and received a right shoulder injection.  He was informed that surgery was not indicated.  He is awaiting to start physical therapy.  Naproxen and Norco are providing some relief.  He has not decreased his intake since the intra-articular injection.        Pain Assessment  Pain Score:   8  Pain Location: Shoulder  Pain Orientation: Right  Pain Descriptors: Aching, Dull, Sharp, Constant  Pain Frequency: Continuous  Pain Onset: Awakened from sleep  Clinical Progression: Not changed  Aggravating Factors:  (using right shoulder)  Pain Intervention(s): Medication (See eMAR), Rest      A's of Opioid Risk Assessment  Activity:Patient can partly perform ADL.   Analgesia:Patients pain is  controlled by current medication.   Adverse Effects: Patient denies constipation or sedation.  Aberrant Behavior:  reviewed with no aberrant drug seeking/taking behavior.      Patient denies any suicidal or homicidal ideations    Physical Therapy/Home Exercise:  Scheduled to start PT      MRI Shoulder Without Contrast Right  Narrative: EXAMINATION:  MRI SHOULDER WITHOUT CONTRAST RIGHT    CLINICAL HISTORY:  Pain in right shoulderRIGHT SHOULDER PAIN;    COMPARISON:  Right shoulder x-ray July 18 2022    TECHNIQUE:  Magnetic resonance imaging of the right shoulder was performed without the use of intravenous contrast.    FINDINGS:  Acromion and acromioclavicular joint: There are mild degenerative changes in the acromioclavicular joint. The acromial undersurface is essentially flat.    Subacromial space: There is no evidence of significant abnormal   fluid in the  subacromial/subdeltoid bursa.    Rotator cuff: Low-grade bursal surface tearing of the supraspinatus and infraspinatus tendons.    Tendon of the long head of the biceps: There is increased signal within the intra-articular portion of the long head of biceps tendon extending to the biceps labral complex consistent with tendinopathy.    Labrum and labral anchor: There is irregularity of the posterosuperior labrum which may reflect sequela of chronic SLAP tear.    Osseous structures: The bone marrow signal is grossly unremarkable.    Glenohumeral Joint: There is no evidence of significant glenohumeral joint effusion.  Mild degenerative change of the glenohumeral joint with some cartilage loss.  Impression: Low-grade bursal surface tearing of the supraspinatus and infraspinatus tendons.    Mild degenerative change of the acromioclavicular and glenohumeral joints.    There is increased signal within the intra-articular portion of the long head of biceps tendon extending to the biceps labral complex consistent with tendinopathy.There is irregularity of the posterosuperior labrum which may reflect sequela of chronic SLAP tear.    Point of Service: [facility]    Electronically signed by: Samson Tenorio  Date:    08/03/2022  Time:    10:41      Review of Systems   Constitutional: Negative.    HENT: Negative.     Eyes: Negative.    Respiratory: Negative.     Cardiovascular: Negative.    Gastrointestinal: Negative.    Endocrine: Negative.    Genitourinary: Negative.    Musculoskeletal:  Positive for arthralgias (Right shoulder).   Integumentary:  Negative.   Allergic/Immunologic: Negative.    Neurological: Negative.    Hematological: Negative.    Psychiatric/Behavioral: Negative.             Past Medical History:   Diagnosis Date    Adenomatous polyp of descending colon 5/17/2021    Anxiety     Diverticula, colon 5/17/2021    GERD (gastroesophageal reflux disease)     History of cerebrovascular accident      Hyperlipidemia     Hypertension     Screening for malignant neoplasm of colon 2021     Past Surgical History:   Procedure Laterality Date    heart cath       Social History     Socioeconomic History    Marital status: Single   Tobacco Use    Smoking status: Former     Packs/day: 1.00     Years: 2.00     Pack years: 2.00     Types: Cigarettes     Start date:      Quit date:      Years since quittin.6    Smokeless tobacco: Never   Substance and Sexual Activity    Alcohol use: Never    Drug use: Never    Sexual activity: Not Currently     Family History   Problem Relation Age of Onset    Heart disease Mother     Cancer Father     Cancer Sister      Review of patient's allergies indicates:  No Known Allergies  has a current medication list which includes the following prescription(s): atorvastatin, buspirone, cefprozil, cyclobenzaprine, hydrocodone-acetaminophen, lisinopril, omeprazole, amoxicillin-clavulanate 500-125mg, ciprofloxacin-hydrocortisone 0.2-1%, [START ON 2022] hydrocodone-acetaminophen, [START ON 2022] naproxen, and neomycin-polymyxin-hydrocortisone.      Objective:  Vitals:    22 1253   BP: 138/74   Pulse: 89        Physical Exam  Vitals and nursing note reviewed.   Constitutional:       General: He is not in acute distress.     Appearance: Normal appearance. He is not ill-appearing, toxic-appearing or diaphoretic.   HENT:      Head: Normocephalic and atraumatic.      Nose: Nose normal.      Mouth/Throat:      Mouth: Mucous membranes are moist.   Eyes:      Extraocular Movements: Extraocular movements intact.      Pupils: Pupils are equal, round, and reactive to light.   Cardiovascular:      Rate and Rhythm: Normal rate and regular rhythm.      Heart sounds: Normal heart sounds.   Pulmonary:      Effort: Pulmonary effort is normal. No respiratory distress.      Breath sounds: Normal breath sounds. No stridor. No wheezing or rhonchi.   Abdominal:      General: Bowel sounds  are normal.      Palpations: Abdomen is soft.   Musculoskeletal:         General: No swelling or deformity.      Right shoulder: Tenderness and bony tenderness present. Decreased range of motion.      Cervical back: Normal and normal range of motion. No spasms or tenderness. No pain with movement. Normal range of motion.      Thoracic back: Normal.      Lumbar back: No spasms, tenderness or bony tenderness. Normal range of motion. Negative right straight leg raise test and negative left straight leg raise test. No scoliosis.      Right lower leg: No edema.      Left lower leg: No edema.   Skin:     General: Skin is warm.   Neurological:      General: No focal deficit present.      Mental Status: He is alert and oriented to person, place, and time. Mental status is at baseline.      Cranial Nerves: No cranial nerve deficit.      Sensory: Sensation is intact. No sensory deficit.      Motor: No weakness.      Coordination: Coordination normal.      Gait: Gait normal.      Deep Tendon Reflexes: Reflexes are normal and symmetric.   Psychiatric:         Mood and Affect: Mood normal.         Behavior: Behavior normal.         Assessment:      1. Tear of right rotator cuff, unspecified tear extent, unspecified whether traumatic    2. Cervicalgia          Plan:  1. reviewed  2.Addiction, Dependency, Tolerance, Opioid abuse-misuse, Death, Diversion Discussed. Overdose reversal drug Naloxone discussed.  3.Refill/Continue medications for pain control and function       Requested Prescriptions     Signed Prescriptions Disp Refills    HYDROcodone-acetaminophen (NORCO) 5-325 mg per tablet 60 tablet 0     Sig: Take 1 tablet by mouth every 12 (twelve) hours as needed for Pain.    naproxen (NAPROSYN) 500 MG tablet 60 tablet 0     Sig: Take 1 tablet (500 mg total) by mouth 2 (two) times daily.     4. Return in 1 month with TONY Garduno for re-evaluation medication management     5. Wean  Norco as tolerated at next office  visit     report:  Reviewed and consistent with medication use as prescribed.      The total time spent for evaluation and management on 09/01/2022 including reviewing separately obtained history, performing a medically appropriate exam and evaluation, documenting clinical information in the health record, independently interpreting results and communicating them to the patient/family/caregiver, and ordering medications/tests/procedures was between 15-29 minutes.    The above plan and management options were discussed at length with patient. Patient is in agreement with the above and verbalized understanding. It will be communicated with the referring physician via electronic record, fax, or mail.

## 2022-09-07 ENCOUNTER — OFFICE VISIT (OUTPATIENT)
Dept: FAMILY MEDICINE | Facility: CLINIC | Age: 62
End: 2022-09-07
Payer: COMMERCIAL

## 2022-09-07 VITALS
DIASTOLIC BLOOD PRESSURE: 80 MMHG | OXYGEN SATURATION: 98 % | RESPIRATION RATE: 18 BRPM | HEART RATE: 80 BPM | BODY MASS INDEX: 26.02 KG/M2 | HEIGHT: 65 IN | WEIGHT: 156.19 LBS | SYSTOLIC BLOOD PRESSURE: 146 MMHG | TEMPERATURE: 98 F

## 2022-09-07 DIAGNOSIS — Z11.59 NEED FOR HEPATITIS C SCREENING TEST: ICD-10-CM

## 2022-09-07 DIAGNOSIS — I10 HYPERTENSION, UNSPECIFIED TYPE: Primary | ICD-10-CM

## 2022-09-07 DIAGNOSIS — Z12.5 SCREENING FOR MALIGNANT NEOPLASM OF PROSTATE: ICD-10-CM

## 2022-09-07 DIAGNOSIS — Z86.73 HISTORY OF CEREBROVASCULAR ACCIDENT: ICD-10-CM

## 2022-09-07 DIAGNOSIS — E78.5 HYPERLIPIDEMIA, UNSPECIFIED HYPERLIPIDEMIA TYPE: ICD-10-CM

## 2022-09-07 LAB — PSA SERPL-MCNC: 0.28 NG/ML (ref 0–4.1)

## 2022-09-07 PROCEDURE — 3061F NEG MICROALBUMINURIA REV: CPT | Mod: ,,, | Performed by: NURSE PRACTITIONER

## 2022-09-07 PROCEDURE — 3008F BODY MASS INDEX DOCD: CPT | Mod: ,,, | Performed by: NURSE PRACTITIONER

## 2022-09-07 PROCEDURE — G0103 PSA, SCREENING: ICD-10-PCS | Mod: ,,, | Performed by: CLINICAL MEDICAL LABORATORY

## 2022-09-07 PROCEDURE — 86803 HEPATITIS C ANTIBODY: ICD-10-PCS | Mod: ,,, | Performed by: CLINICAL MEDICAL LABORATORY

## 2022-09-07 PROCEDURE — 1159F PR MEDICATION LIST DOCUMENTED IN MEDICAL RECORD: ICD-10-PCS | Mod: ,,, | Performed by: NURSE PRACTITIONER

## 2022-09-07 PROCEDURE — G0103 PSA SCREENING: HCPCS | Mod: ,,, | Performed by: CLINICAL MEDICAL LABORATORY

## 2022-09-07 PROCEDURE — 3079F DIAST BP 80-89 MM HG: CPT | Mod: ,,, | Performed by: NURSE PRACTITIONER

## 2022-09-07 PROCEDURE — 3066F PR DOCUMENTATION OF TREATMENT FOR NEPHROPATHY: ICD-10-PCS | Mod: ,,, | Performed by: NURSE PRACTITIONER

## 2022-09-07 PROCEDURE — 3061F PR NEG MICROALBUMINURIA RESULT DOCUMENTED/REVIEW: ICD-10-PCS | Mod: ,,, | Performed by: NURSE PRACTITIONER

## 2022-09-07 PROCEDURE — 1160F RVW MEDS BY RX/DR IN RCRD: CPT | Mod: ,,, | Performed by: NURSE PRACTITIONER

## 2022-09-07 PROCEDURE — 3008F PR BODY MASS INDEX (BMI) DOCUMENTED: ICD-10-PCS | Mod: ,,, | Performed by: NURSE PRACTITIONER

## 2022-09-07 PROCEDURE — 3079F PR MOST RECENT DIASTOLIC BLOOD PRESSURE 80-89 MM HG: ICD-10-PCS | Mod: ,,, | Performed by: NURSE PRACTITIONER

## 2022-09-07 PROCEDURE — 86803 HEPATITIS C AB TEST: CPT | Mod: ,,, | Performed by: CLINICAL MEDICAL LABORATORY

## 2022-09-07 PROCEDURE — 3066F NEPHROPATHY DOC TX: CPT | Mod: ,,, | Performed by: NURSE PRACTITIONER

## 2022-09-07 PROCEDURE — 1159F MED LIST DOCD IN RCRD: CPT | Mod: ,,, | Performed by: NURSE PRACTITIONER

## 2022-09-07 PROCEDURE — 4010F PR ACE/ARB THEARPY RXD/TAKEN: ICD-10-PCS | Mod: ,,, | Performed by: NURSE PRACTITIONER

## 2022-09-07 PROCEDURE — 1160F PR REVIEW ALL MEDS BY PRESCRIBER/CLIN PHARMACIST DOCUMENTED: ICD-10-PCS | Mod: ,,, | Performed by: NURSE PRACTITIONER

## 2022-09-07 PROCEDURE — 4010F ACE/ARB THERAPY RXD/TAKEN: CPT | Mod: ,,, | Performed by: NURSE PRACTITIONER

## 2022-09-07 PROCEDURE — 99214 PR OFFICE/OUTPT VISIT, EST, LEVL IV, 30-39 MIN: ICD-10-PCS | Mod: ,,, | Performed by: NURSE PRACTITIONER

## 2022-09-07 PROCEDURE — 99214 OFFICE O/P EST MOD 30 MIN: CPT | Mod: ,,, | Performed by: NURSE PRACTITIONER

## 2022-09-07 PROCEDURE — 3077F PR MOST RECENT SYSTOLIC BLOOD PRESSURE >= 140 MM HG: ICD-10-PCS | Mod: ,,, | Performed by: NURSE PRACTITIONER

## 2022-09-07 PROCEDURE — 3077F SYST BP >= 140 MM HG: CPT | Mod: ,,, | Performed by: NURSE PRACTITIONER

## 2022-09-07 NOTE — PROGRESS NOTES
Kaya Go DNP, FNP    [address] [place] [address]     PATIENT NAME: Amador Callahan  : 1960  DATE: 22  MRN: [id number]      Billing Provider: Kaya Go DNP, FNP  Level of Service:   Patient PCP Information       Provider PCP Type    Kaya Go DNP, FNP General            Reason for Visit / Chief Complaint: Follow-up       Update PCP  Update Chief Complaint         History of Present Illness / Problem Focused Workflow     Amador Callahan presents to the clinic with Follow-up     Pt here for evaluation of chronic medical problems. Denies any complaints.     Follow-up  Pertinent negatives include no abdominal pain, arthralgias, change in bowel habit, chest pain, chills, congestion, coughing, fatigue, fever, headaches, myalgias, nausea, rash, sore throat, vomiting or weakness.     Review of Systems     Review of Systems   Constitutional:  Negative for activity change, appetite change, chills, fatigue and fever.   HENT:  Negative for nasal congestion, ear pain, hearing loss, postnasal drip and sore throat.    Respiratory:  Negative for cough, chest tightness, shortness of breath and wheezing.    Cardiovascular:  Negative for chest pain, palpitations, leg swelling and claudication.   Gastrointestinal:  Negative for abdominal pain, change in bowel habit, constipation, diarrhea, nausea, vomiting and change in bowel habit.   Genitourinary:  Negative for dysuria.   Musculoskeletal:  Negative for arthralgias, back pain, gait problem and myalgias.   Integumentary:  Negative for rash.   Neurological:  Negative for weakness and headaches.   Psychiatric/Behavioral:  Negative for suicidal ideas. The patient is not nervous/anxious.       Medical / Social / Family History     Past Medical History:   Diagnosis Date    Adenomatous polyp of descending colon 2021    Anxiety     Diverticula, colon 2021    GERD (gastroesophageal reflux disease)      "History of cerebrovascular accident     Hyperlipidemia     Hypertension     Screening for malignant neoplasm of colon 2021       Past Surgical History:   Procedure Laterality Date    heart cath         Social History  Mr. Amador Callahan  reports that he quit smoking about 42 years ago. His smoking use included cigarettes. He started smoking about 44 years ago. He has a 2.00 pack-year smoking history. He has never used smokeless tobacco. He reports that he does not drink alcohol and does not use drugs.    Family History  Mr. Amador Callahan's family history includes Cancer in his father and sister; Heart disease in his mother.    Medications and Allergies     Medications  Outpatient Medications Marked as Taking for the 22 encounter (Office Visit) with Kaya Go, VERONIKA, FNP   Medication Sig Dispense Refill    atorvastatin (LIPITOR) 40 MG tablet Take 1 tablet (40 mg total) by mouth once daily. 90 tablet 1    busPIRone (BUSPAR) 10 MG tablet Take 1 tablet (10 mg total) by mouth 2 (two) times a day. 180 tablet 1    [] HYDROcodone-acetaminophen (NORCO) 5-325 mg per tablet Take 1 tablet by mouth every 12 (twelve) hours as needed for Pain. 46 tablet 0    lisinopriL 10 MG tablet Take 1 tablet (10 mg total) by mouth once daily. 90 tablet 1    naproxen (NAPROSYN) 500 MG tablet Take 1 tablet (500 mg total) by mouth 2 (two) times daily. 60 tablet 0    omeprazole (PRILOSEC) 20 MG capsule Take 1 capsule (20 mg total) by mouth once daily. 90 capsule 1       Allergies  Review of patient's allergies indicates:  No Known Allergies    Physical Examination     Vitals:    22 1453 22 1504   BP: (!) 148/79 (!) 146/80   BP Location: Left arm Right arm   Patient Position: Sitting Sitting   BP Method: Large (Automatic)    Pulse: 80    Resp: 18    Temp: 98.4 °F (36.9 °C)    TempSrc: Oral    SpO2: 98%    Weight: 70.9 kg (156 lb 3.2 oz)    Height: 5' 5" (1.651 m)      Physical Exam  Vitals and nursing note " reviewed.   Constitutional:       General: He is not in acute distress.     Appearance: He is normal weight.   HENT:      Mouth/Throat:      Mouth: Mucous membranes are moist.      Comments: Mouth drawn--chronic  Eyes:      Pupils: Pupils are equal, round, and reactive to light.   Cardiovascular:      Rate and Rhythm: Normal rate and regular rhythm.      Pulses: Normal pulses.      Heart sounds: No murmur heard.  Pulmonary:      Effort: Pulmonary effort is normal. No respiratory distress.      Breath sounds: Normal breath sounds. No wheezing, rhonchi or rales.   Chest:      Chest wall: No tenderness.   Abdominal:      General: Bowel sounds are normal. There is no distension.      Palpations: Abdomen is soft.      Tenderness: There is no abdominal tenderness. There is no right CVA tenderness, left CVA tenderness, guarding or rebound.   Musculoskeletal:         General: No swelling or tenderness. Normal range of motion.      Cervical back: Normal range of motion and neck supple.      Right lower leg: No edema.      Left lower leg: No edema.   Skin:     General: Skin is warm and dry.   Neurological:      General: No focal deficit present.      Mental Status: He is alert and oriented to person, place, and time.   Psychiatric:         Mood and Affect: Mood normal.         Behavior: Behavior normal.         Thought Content: Thought content normal.         Judgment: Judgment normal.        Assessment and Plan (including Health Maintenance)      Problem List  Smart Sets  Document Outside HM   :    Plan:         Health Maintenance Due   Topic Date Due    Influenza Vaccine (1) Never done       Problem List Items Addressed This Visit          Neuro    History of cerebrovascular accident (Chronic)       Cardiac/Vascular    Hyperlipidemia (Chronic)    Hypertension - Primary (Chronic)     Other Visit Diagnoses       Screening for malignant neoplasm of prostate        Relevant Orders    PSA, Screening (Completed)    Need for  hepatitis C screening test        Relevant Orders    Hepatitis C Antibody (Completed)          Hypertension, unspecified type    Screening for malignant neoplasm of prostate  -     PSA, Screening; Future; Expected date: 09/07/2022    Need for hepatitis C screening test  -     Hepatitis C Antibody; Future; Expected date: 09/07/2022    Hyperlipidemia, unspecified hyperlipidemia type    History of cerebrovascular accident     Health Maintenance Topics with due status: Not Due       Topic Last Completion Date    Colorectal Cancer Screening 05/17/2021    Lipid Panel 06/02/2022    PROSTATE-SPECIFIC ANTIGEN 09/07/2022    High Dose Statin 09/07/2022         Future Appointments   Date Time Provider Department Center   9/13/2022 10:30 AM [facility]   9/29/2022 12:45 PM JAMEY Orourke [facility]   12/7/2022  9:00 AM Kaya Go DNP, FNP [facility] [place]   4/12/2023  2:00 PM AWV NURSE, [facility] [place]        Follow up in about 3 months (around 12/7/2022).     Signature:  Kaya Go DNP, HERVE  [address] [place] [address]  Phone #: [phone]  Fax #: [phone]    Date of encounter: 9/7/22    Patient Instructions   Continue current medication regimen. Will call pt with lab results. Recommend exercise 30 minutes per day most days of the week. Follow up in 6 months for chronic medical problems. Follow up as needed.

## 2022-09-08 LAB — HCV AB SER QL: NORMAL

## 2022-09-11 NOTE — PATIENT INSTRUCTIONS
Continue current medication regimen. Will call pt with lab results. Recommend exercise 30 minutes per day most days of the week. Follow up in 6 months for chronic medical problems. Follow up as needed.

## 2022-09-29 ENCOUNTER — OFFICE VISIT (OUTPATIENT)
Dept: PAIN MEDICINE | Facility: CLINIC | Age: 62
End: 2022-09-29
Payer: COMMERCIAL

## 2022-09-29 VITALS
DIASTOLIC BLOOD PRESSURE: 87 MMHG | HEIGHT: 66 IN | WEIGHT: 155.63 LBS | SYSTOLIC BLOOD PRESSURE: 161 MMHG | HEART RATE: 79 BPM | BODY MASS INDEX: 25.01 KG/M2

## 2022-09-29 DIAGNOSIS — M54.2 NECK PAIN: Chronic | ICD-10-CM

## 2022-09-29 DIAGNOSIS — G89.29 CHRONIC RIGHT SHOULDER PAIN: Primary | Chronic | ICD-10-CM

## 2022-09-29 DIAGNOSIS — M89.49 OSTEOARTHROSIS MULTIPLE SITES, NOT SPECIFIED AS GENERALIZED: Chronic | ICD-10-CM

## 2022-09-29 DIAGNOSIS — M54.12 CERVICAL RADICULOPATHY: Chronic | ICD-10-CM

## 2022-09-29 DIAGNOSIS — M25.511 CHRONIC RIGHT SHOULDER PAIN: Primary | Chronic | ICD-10-CM

## 2022-09-29 PROCEDURE — 3008F BODY MASS INDEX DOCD: CPT | Mod: CPTII,,, | Performed by: PHYSICIAN ASSISTANT

## 2022-09-29 PROCEDURE — 3077F SYST BP >= 140 MM HG: CPT | Mod: CPTII,,, | Performed by: PHYSICIAN ASSISTANT

## 2022-09-29 PROCEDURE — 3061F PR NEG MICROALBUMINURIA RESULT DOCUMENTED/REVIEW: ICD-10-PCS | Mod: CPTII,,, | Performed by: PHYSICIAN ASSISTANT

## 2022-09-29 PROCEDURE — 3079F PR MOST RECENT DIASTOLIC BLOOD PRESSURE 80-89 MM HG: ICD-10-PCS | Mod: CPTII,,, | Performed by: PHYSICIAN ASSISTANT

## 2022-09-29 PROCEDURE — 1159F MED LIST DOCD IN RCRD: CPT | Mod: CPTII,,, | Performed by: PHYSICIAN ASSISTANT

## 2022-09-29 PROCEDURE — 4010F PR ACE/ARB THEARPY RXD/TAKEN: ICD-10-PCS | Mod: CPTII,,, | Performed by: PHYSICIAN ASSISTANT

## 2022-09-29 PROCEDURE — 99214 PR OFFICE/OUTPT VISIT, EST, LEVL IV, 30-39 MIN: ICD-10-PCS | Mod: S$PBB,,, | Performed by: PHYSICIAN ASSISTANT

## 2022-09-29 PROCEDURE — 3066F NEPHROPATHY DOC TX: CPT | Mod: CPTII,,, | Performed by: PHYSICIAN ASSISTANT

## 2022-09-29 PROCEDURE — 4010F ACE/ARB THERAPY RXD/TAKEN: CPT | Mod: CPTII,,, | Performed by: PHYSICIAN ASSISTANT

## 2022-09-29 PROCEDURE — 99214 OFFICE O/P EST MOD 30 MIN: CPT | Mod: S$PBB,,, | Performed by: PHYSICIAN ASSISTANT

## 2022-09-29 PROCEDURE — 3008F PR BODY MASS INDEX (BMI) DOCUMENTED: ICD-10-PCS | Mod: CPTII,,, | Performed by: PHYSICIAN ASSISTANT

## 2022-09-29 PROCEDURE — 99215 OFFICE O/P EST HI 40 MIN: CPT | Mod: PBBFAC | Performed by: PHYSICIAN ASSISTANT

## 2022-09-29 PROCEDURE — 3066F PR DOCUMENTATION OF TREATMENT FOR NEPHROPATHY: ICD-10-PCS | Mod: CPTII,,, | Performed by: PHYSICIAN ASSISTANT

## 2022-09-29 PROCEDURE — 3077F PR MOST RECENT SYSTOLIC BLOOD PRESSURE >= 140 MM HG: ICD-10-PCS | Mod: CPTII,,, | Performed by: PHYSICIAN ASSISTANT

## 2022-09-29 PROCEDURE — 3061F NEG MICROALBUMINURIA REV: CPT | Mod: CPTII,,, | Performed by: PHYSICIAN ASSISTANT

## 2022-09-29 PROCEDURE — 1159F PR MEDICATION LIST DOCUMENTED IN MEDICAL RECORD: ICD-10-PCS | Mod: CPTII,,, | Performed by: PHYSICIAN ASSISTANT

## 2022-09-29 PROCEDURE — 3079F DIAST BP 80-89 MM HG: CPT | Mod: CPTII,,, | Performed by: PHYSICIAN ASSISTANT

## 2022-09-29 RX ORDER — HYDROCODONE BITARTRATE AND ACETAMINOPHEN 10; 325 MG/1; MG/1
1 TABLET ORAL EVERY 12 HOURS PRN
Qty: 30 TABLET | Refills: 0 | Status: SHIPPED | OUTPATIENT
Start: 2022-10-02 | End: 2022-10-17

## 2022-09-29 RX ORDER — GABAPENTIN 100 MG/1
100 CAPSULE ORAL EVERY 8 HOURS
Qty: 90 CAPSULE | Refills: 0 | Status: SHIPPED | OUTPATIENT
Start: 2022-09-29 | End: 2022-11-09 | Stop reason: SDUPTHER

## 2022-09-29 RX ORDER — HYDROCODONE BITARTRATE AND ACETAMINOPHEN 5; 325 MG/1; MG/1
1 TABLET ORAL EVERY 12 HOURS PRN
COMMUNITY
End: 2022-11-09 | Stop reason: SDUPTHER

## 2022-09-29 NOTE — PATIENT INSTRUCTIONS
COVID SCREENING TO BE DONE 48-72 HOURS PRIOR TO PROCEDURE IF PT HAS NOT RECEIVED BOTH COVID VACCINATIONS OR HAS BEEN VACCINATED WITHIN THE LAST 2 WEEKS. VACCINATION CARD MUST BE PROVIDED IF PT HAS BEEN VACCINATED OR PT MUST HAVE COVID SCREENING IN ORDER TO HAVE PROCEDURE.  IF YOUR PROCEDURE IS ON A Tuesday, GET COVID TESTING ON THE Friday PRIOR TO PROCEDURE.  IF YOUR PROCEDURE IS ON A Thursday GET COVID TESTING ON THE Monday OR Tuesday PRIOR TO PROCEDURE.    IF YOUR PRIMARY CARE DOCTOR ORDERS YOUR COVID TESTING YOU MUST BRING YOUR RESULTS WITH YOU TO YOUR PROCEDURE.    Procedure Instructions:    Nothing to eat or drink for 8 hours or after midnight including gum, candy, mints, or tobacco products.  If you are scheduled for 1:30 or later nothing to eat or drink after 5 a.m. the morning of the procedure, including gum, candy, mints, or tobacco products.  Must have a  at least 18 yrs of age to stay present at all times  No Diabetic medications the morning of procedure, check blood sugar the morning of procedure, if it is greater than 200 call the office at [phone]  If you are started on antibiotics or have been prescribed antibiotics, have a fever, or have any other type of infection call to reschedule procedure.  If you take blood pressure medications you can take it at your regular scheduled time.        HOLD ASPIRIN AND ASPIRIN PRODUCTS  (ASPIRIN, BC POWDER ETC. ) FOR 7 DAYS  PRIOR TO PROCEDURE  HOLD NSAIDS( ibuprofen, mobic, meloxicam, advil, diclofenac, methotrexate, aleve etc....)  FOR 3 DAYS   PRIOR TO PROCEDURE     Hold naproxen starting 10/22

## 2022-09-29 NOTE — PROGRESS NOTES
Subjective:         Patient ID: Amador Callahan is a 61 y.o. male.    Chief Complaint: Shoulder Pain and Neck Pain      Pain  This is a chronic problem. The current episode started more than 1 year ago. The problem occurs daily. The problem has been waxing and waning. Associated symptoms include arthralgias, chest pain and neck pain. Pertinent negatives include no abdominal pain, anorexia, change in bowel habit, chills, coughing, diaphoresis, fever, rash, sore throat, swollen glands, vertigo or vomiting.   Review of Systems   Constitutional:  Negative for activity change, appetite change, chills, diaphoresis, fever and unexpected weight change.   HENT:  Negative for drooling, ear discharge, ear pain, facial swelling, nosebleeds, sore throat, trouble swallowing, voice change and goiter.    Eyes:  Negative for photophobia, pain, discharge, redness and visual disturbance.   Respiratory:  Negative for apnea, cough, choking, chest tightness, shortness of breath, wheezing and stridor.    Cardiovascular:  Positive for chest pain. Negative for palpitations and leg swelling.   Gastrointestinal:  Negative for abdominal distention, abdominal pain, anorexia, change in bowel habit, diarrhea, rectal pain, vomiting, fecal incontinence and change in bowel habit.   Endocrine: Negative for cold intolerance, heat intolerance, polydipsia, polyphagia and polyuria.   Genitourinary:  Negative for bladder incontinence, dysuria, flank pain and frequency.   Musculoskeletal:  Positive for arthralgias, back pain, leg pain and neck pain.   Integumentary:  Negative for color change, pallor and rash.   Neurological:  Negative for dizziness, vertigo, seizures, syncope, facial asymmetry, speech difficulty, light-headedness, coordination difficulties, memory loss and coordination difficulties.   Hematological:  Negative for adenopathy. Does not bruise/bleed easily.   Psychiatric/Behavioral:  Negative for agitation, behavioral problems, confusion,  "decreased concentration, dysphoric mood, hallucinations, self-injury and suicidal ideas. The patient is not nervous/anxious and is not hyperactive.          Past Medical History:   Diagnosis Date    Adenomatous polyp of descending colon 2021    Anxiety     Diverticula, colon 2021    GERD (gastroesophageal reflux disease)     History of cerebrovascular accident     Hyperlipidemia     Hypertension     Screening for malignant neoplasm of colon 2021     Past Surgical History:   Procedure Laterality Date    heart cath       Social History     Socioeconomic History    Marital status: Single   Tobacco Use    Smoking status: Former     Packs/day: 1.00     Years: 2.00     Pack years: 2.00     Types: Cigarettes     Start date:      Quit date:      Years since quittin.7    Smokeless tobacco: Never   Substance and Sexual Activity    Alcohol use: Never    Drug use: Never    Sexual activity: Not Currently     Family History   Problem Relation Age of Onset    Heart disease Mother     Cancer Father     Cancer Sister      Review of patient's allergies indicates:  No Known Allergies     Objective:  Vitals:    22 1312 22 1313   BP: (!) 161/87    Pulse: 79    Weight: 70.6 kg (155 lb 9.6 oz)    Height: 5' 6" (1.676 m)    PainSc:   9   9         Physical Exam  Vitals and nursing note reviewed. Exam conducted with a chaperone present.   Constitutional:       General: He is awake. He is not in acute distress.     Appearance: Normal appearance. He is not ill-appearing, toxic-appearing or diaphoretic.   HENT:      Head: Normocephalic and atraumatic.      Nose: Nose normal.      Mouth/Throat:      Mouth: Mucous membranes are moist.      Pharynx: Oropharynx is clear.   Eyes:      Conjunctiva/sclera: Conjunctivae normal.      Pupils: Pupils are equal, round, and reactive to light.   Cardiovascular:      Rate and Rhythm: Normal rate.   Pulmonary:      Effort: Pulmonary effort is normal. No respiratory " distress.   Abdominal:      Palpations: Abdomen is soft.      Tenderness: There is no guarding.   Musculoskeletal:         General: Normal range of motion.      Cervical back: Normal range of motion and neck supple. No rigidity.   Skin:     General: Skin is warm and dry.      Coloration: Skin is not jaundiced or pale.   Neurological:      General: No focal deficit present.      Mental Status: He is alert and oriented to person, place, and time. Mental status is at baseline.      Cranial Nerves: No cranial nerve deficit (II-XII).   Psychiatric:         Mood and Affect: Mood normal.         Behavior: Behavior normal. Behavior is cooperative.         Thought Content: Thought content normal.         MRI Shoulder Without Contrast Right  Narrative: EXAMINATION:  MRI SHOULDER WITHOUT CONTRAST RIGHT    CLINICAL HISTORY:  Pain in right shoulderRIGHT SHOULDER PAIN;    COMPARISON:  Right shoulder x-ray July 18 2022    TECHNIQUE:  Magnetic resonance imaging of the right shoulder was performed without the use of intravenous contrast.    FINDINGS:  Acromion and acromioclavicular joint: There are mild degenerative changes in the acromioclavicular joint. The acromial undersurface is essentially flat.    Subacromial space: There is no evidence of significant abnormal   fluid in the subacromial/subdeltoid bursa.    Rotator cuff: Low-grade bursal surface tearing of the supraspinatus and infraspinatus tendons.    Tendon of the long head of the biceps: There is increased signal within the intra-articular portion of the long head of biceps tendon extending to the biceps labral complex consistent with tendinopathy.    Labrum and labral anchor: There is irregularity of the posterosuperior labrum which may reflect sequela of chronic SLAP tear.    Osseous structures: The bone marrow signal is grossly unremarkable.    Glenohumeral Joint: There is no evidence of significant glenohumeral joint effusion.  Mild degenerative change of the  glenohumeral joint with some cartilage loss.  Impression: Low-grade bursal surface tearing of the supraspinatus and infraspinatus tendons.    Mild degenerative change of the acromioclavicular and glenohumeral joints.    There is increased signal within the intra-articular portion of the long head of biceps tendon extending to the biceps labral complex consistent with tendinopathy.There is irregularity of the posterosuperior labrum which may reflect sequela of chronic SLAP tear.    Point of Service: [facility]    Electronically signed by: Samson Tenorio  Date:    08/03/2022  Time:    10:41       Office Visit on 09/07/2022   Component Date Value Ref Range Status    PSA Total 09/07/2022 0.284  0.000 - 4.100 ng/mL Final    Hepatitis C Ab 09/07/2022 Non-Reactive  Non-Reactive Final   Office Visit on 07/18/2022   Component Date Value Ref Range Status    POC Amphetamines 07/18/2022 Negative  Negative, Inconclusive Final    POC Barbiturates 07/18/2022 Negative  Negative, Inconclusive Final    POC Benzodiazepines 07/18/2022 Negative  Negative, Inconclusive Final    POC Cocaine 07/18/2022 Negative  Negative, Inconclusive Final    POC THC 07/18/2022 Negative  Negative, Inconclusive Final    POC Methadone 07/18/2022 Negative  Negative, Inconclusive Final    POC Methamphetamine 07/18/2022 Negative  Negative, Inconclusive Final    POC Opiates 07/18/2022 Negative  Negative, Inconclusive Final    POC Oxycodone 07/18/2022 Negative  Negative, Inconclusive Final    POC Phencyclidine 07/18/2022 Negative  Negative, Inconclusive Final    POC Methylenedioxymethamphetamine * 07/18/2022 Negative  Negative, Inconclusive Final    POC Tricyclic Antidepressants 07/18/2022 Negative  Negative, Inconclusive Final    POC Buprenorphine 07/18/2022 Negative   Final     Acceptable 07/18/2022 Yes   Final    POC Temperature (Urine) 07/18/2022 92   Final    pH, UA 07/18/2022 6.0  5.0 to 8.0 pH Units Final    Creatinine, Urine  07/18/2022 83  39 - 259 mg/dL Final    6-Acetylmorphine 07/18/2022 Negative  10 ng/mL Final    7-Aminoclonazepam 07/18/2022 Negative  Negative 25 ng/mL Final    a-Hydroxyalprazolam 07/18/2022 Negative  Negative 25 ng/mL Final    Acetyl Fentanyl 07/18/2022 Negative  2.5 ng/mL Final    Acetyl Norfentanyl Oxalate 07/18/2022 Negative  5 ng/mL Final    Benzoylecgonine 07/18/2022 Negative  100 ng/mL Final    Buprenorphine 07/18/2022 Negative  25 ng/mL Final    Codeine 07/18/2022 Negative  25 ng/mL Final    EDDP 07/18/2022 Negative  25 ng/mL Final    Fentanyl 07/18/2022 Negative  2.5 ng/mL Final    Hydrocodone 07/18/2022 Negative  25 ng/mL Final    Hydromorphone 07/18/2022 Negative  25 ng/mL Final    Lorazepam 07/18/2022 Negative  25 ng/mL Final    Morphine 07/18/2022 Negative  25 ng/mL Final    Norbuprenorphine 07/18/2022 Negative  25 ng/mL Final    Nordiazepam 07/18/2022 Negative  25 ng/mL Final    Norfentanyl Oxalate 07/18/2022 Negative  5 ng/mL Final    Norhydrocodone 07/18/2022 Negative  50 ng/mL Final    Noroxycodone HCL 07/18/2022 Negative  50 ng/mL Final    Oxazepam 07/18/2022 Negative  25 ng/mL Final    Oxymorphone 07/18/2022 Negative  25 ng/mL Final    Tapentadol 07/18/2022 Negative  25 ng/mL Final    Temazepam 07/18/2022 Negative  25 ng/mL Final    THC-COOH 07/18/2022 Negative  25 ng/mL Final    Tramadol 07/18/2022 Negative  100 ng/mL Final    Amphetamine, Urine 07/18/2022 Negative  Negative Final    Methamphetamines, Urine 07/18/2022 Negative  Negative Final    Methadone, Urine 07/18/2022 Negative  Negative 25 ng/mL Final    Oxycodone, Urine 07/18/2022 Negative  Negative 25 ng/mL Final    Specific Gravity, UA 07/18/2022 1.010  <=1.030 Final   Office Visit on 06/02/2022   Component Date Value Ref Range Status    Creatinine, Urine 06/02/2022 137  39 - 259 mg/dL Final    Microalbumin 06/02/2022 1.5  0.0 - 2.8 mg/dL Final    Microalbumin/Creatinine Ratio 06/02/2022 10.9  0.0 - 30.0 mg/g Final    Sodium 06/02/2022  140  136 - 145 mmol/L Final    Potassium 06/02/2022 4.1  3.5 - 5.1 mmol/L Final    Chloride 06/02/2022 104  98 - 107 mmol/L Final    CO2 06/02/2022 28  21 - 32 mmol/L Final    Anion Gap 06/02/2022 12  7 - 16 mmol/L Final    Glucose 06/02/2022 105  74 - 106 mg/dL Final    BUN 06/02/2022 11  7 - 18 mg/dL Final    Creatinine 06/02/2022 1.00  0.70 - 1.30 mg/dL Final    BUN/Creatinine Ratio 06/02/2022 11  6 - 20 Final    Calcium 06/02/2022 9.0  8.5 - 10.1 mg/dL Final    Total Protein 06/02/2022 8.1  6.4 - 8.2 g/dL Final    Albumin 06/02/2022 3.8  3.5 - 5.0 g/dL Final    Globulin 06/02/2022 4.3 (H)  2.0 - 4.0 g/dL Final    A/G Ratio 06/02/2022 0.9   Final    Bilirubin, Total 06/02/2022 0.5  0.0 - 1.2 mg/dL Final    Alk Phos 06/02/2022 161 (H)  45 - 115 U/L Final    ALT 06/02/2022 78 (H)  16 - 61 U/L Final    AST 06/02/2022 32  15 - 37 U/L Final    eGFR 06/02/2022 80  >=60 mL/min/1.73m² Final    Triglycerides 06/02/2022 123  35 - 150 mg/dL Final    Cholesterol 06/02/2022 150  0 - 200 mg/dL Final    HDL Cholesterol 06/02/2022 42  40 - 60 mg/dL Final    Cholesterol/HDL Ratio (Risk Factor) 06/02/2022 3.6   Final    Non-HDL 06/02/2022 108  mg/dL Final    LDL Calculated 06/02/2022 83  mg/dL Final    LDL/HDL 06/02/2022 2.0   Final    VLDL 06/02/2022 25  mg/dL Final    WBC 06/02/2022 8.18  4.50 - 11.00 K/uL Final    RBC 06/02/2022 5.46  4.60 - 6.20 M/uL Final    Hemoglobin 06/02/2022 14.8  13.5 - 18.0 g/dL Final    Hematocrit 06/02/2022 46.4  40.0 - 54.0 % Final    MCV 06/02/2022 85.0  80.0 - 96.0 fL Final    MCH 06/02/2022 27.1  27.0 - 31.0 pg Final    MCHC 06/02/2022 31.9 (L)  32.0 - 36.0 g/dL Final    RDW 06/02/2022 12.5  11.5 - 14.5 % Final    Platelet Count 06/02/2022 252  150 - 400 K/uL Final    MPV 06/02/2022 11.1  9.4 - 12.4 fL Final    Neutrophils % 06/02/2022 66.9 (H)  53.0 - 65.0 % Final    Lymphocytes % 06/02/2022 20.9 (L)  27.0 - 41.0 % Final    Monocytes % 06/02/2022 7.1 (H)  2.0 - 6.0 % Final    Eosinophils %  06/02/2022 4.3 (H)  1.0 - 4.0 % Final    Basophils % 06/02/2022 0.6  0.0 - 1.0 % Final    Immature Granulocytes % 06/02/2022 0.2  0.0 - 0.4 % Final    nRBC, Auto 06/02/2022 0.0  <=0.0 % Final    Neutrophils, Abs 06/02/2022 5.47  1.80 - 7.70 K/uL Final    Lymphocytes, Absolute 06/02/2022 1.71  1.00 - 4.80 K/uL Final    Monocytes, Absolute 06/02/2022 0.58  0.00 - 0.80 K/uL Final    Eosinophils, Absolute 06/02/2022 0.35  0.00 - 0.50 K/uL Final    Basophils, Absolute 06/02/2022 0.05  0.00 - 0.20 K/uL Final    Immature Granulocytes, Absolute 06/02/2022 0.02  0.00 - 0.04 K/uL Final    nRBC, Absolute 06/02/2022 0.00  <=0.00 x10e3/uL Final    Diff Type 06/02/2022 Auto   Final         Orders Placed This Encounter   Procedures    Case Request Operating Room: Injection-steroid-epidural-cervical, C4/5 EDE     Order Specific Question:   Medical Necessity:     Answer:   Medically Non-Urgent [100]     Order Specific Question:   CPT Code:     Answer:   WI INJ CERV/THORAC, W/GUIDANCE [73501]     Order Specific Question:   Is an on-site pathologist required for this procedure?     Answer:   N/A         Requested Prescriptions     Signed Prescriptions Disp Refills    HYDROcodone-acetaminophen (NORCO)  mg per tablet 30 tablet 0     Sig: Take 1 tablet by mouth every 12 (twelve) hours as needed for Pain.    gabapentin (NEURONTIN) 100 MG capsule 90 capsule 0     Sig: Take 1 capsule (100 mg total) by mouth every 8 (eight) hours.       Assessment:     1. Chronic right shoulder pain    2. Osteoarthrosis multiple sites, not specified as generalized    3. Neck pain    4. Cervical radiculopathy MRI May 2022         A's of Opioid Risk Assessment  Activity:Patient can perform ADL.   Analgesia:Patients pain is partially controlled by current medication. Patient has tried OTC medications such as Tylenol and Ibuprofen with out relief.   Adverse Effects: Patient denies constipation or sedation.  Aberrant Behavior:  reviewed with no aberrant  drug seeking/taking behavior.  Overdose reversal drug naloxone discussed    Drug screen reviewed      Plan:    Bell's palsy left face    He is Reluctant to wean medication given the amount of pain he has at this time    Continues with right upper arm right shoulder right neck pain numbness and tingling requesting further    Options for pain control     He had a right shoulder injection orthopedics [facility] he states it helped for a little while but the pain has returned    We discussed MRI cervical spine [facility] May 2022 which shows severe right neuroforaminal narrowing central canal narrowing due to herniated discs, severe right neural foraminal narrowing C5/6 multiple level degenerative change    Trial gabapentin 100 mg 1 p.o. q.8 hours     Continue Norco as directed     Indications for this procedure for this specific patient include the following     - Injections being provided as part of a comprehensive pain management program.    - Pt has failed 6 weeks of conservative therapy including oral meds, PT and or home exercise program which has been discussed with the patient   - Injection being provided for suspected radicular pain.    - Pain scale of greater than or equal to 3/10 with functional impairment  - No evidence of local or systemic infection, bleeding tendency or unstable medical condition.    - Pain is causing significant functional limitation resulting in diminished quality of life and impaired age appropriate ADL's.   - Repeat injections are done no sooner than 7 days after the previous injection  - Epidural done for suspected radicular pain along dermatome of nerve   - Epidural done to differentiate level of radicular nerve root pain   - Repeat injections done only when pt reports 50% improvement in pain from previous injections    -increased level of function  - patient is aware if they take any NSAIDs/anticoagulant prior to procedure procedure will be postponed, this was listed on the  preop instructions and highlighted, list of NSAIDs/anticoagulant reviewed with patient to include methotrexate  - Injection done at C4/5 level(s) which is consistent with patient's dermatomal pain complaint  The planned medically necessary  surgical procedure is performed in a hospital outpatient department and not in an ambulatory surgical center due to:     -there is no geographically assessable ambulatory surgery center that has the  necessary equipment and fluoroscopy needed for the procedure     -there is no geographically assessable ambulatory surgical center available at which the physician has privileges     -an ASC's  specific  guideline regarding the individuals weight or health conditions that prevent the use of an ASC    Monitor anesthesia request is medically indicated for the scheduled nerve block procedure due to:  1- needle phobia and anxiety, placing  the patient at risk during the provided service.  2-patient has an ASA class greater than 3 and requires constant presence of an anesthesiologist during the procedure,   3-patient has severe problems hard to lie still  4-patient suffers from chronic pain and is unable to function due to  diminished ADLs    Continue home exercise program as directed     Schedule cervical C4/5 EDE # 1, cervical radiculopathy    Dr. Hdz    Bring original prescription medication bottles/container/box with labels to each visit    Pill count    Physical therapy    Massage therapy declines

## 2022-10-25 ENCOUNTER — TELEPHONE (OUTPATIENT)
Dept: PAIN MEDICINE | Facility: CLINIC | Age: 62
End: 2022-10-25
Payer: COMMERCIAL

## 2022-10-25 NOTE — TELEPHONE ENCOUNTER
Pt called stating he just remember he had a procedure scheduled today.  Pt was scheduled at 0840 for C4-C5 EDE.  Pt states he just ate breakfast.  Pt was last seen by DOM Villaseñor on 9-.  Pt was given office visit with DOM Villaseñor on 11-9-2022 at 2pm  voiced understanding.

## 2022-11-09 ENCOUNTER — OFFICE VISIT (OUTPATIENT)
Dept: PAIN MEDICINE | Facility: CLINIC | Age: 62
End: 2022-11-09
Payer: COMMERCIAL

## 2022-11-09 VITALS
BODY MASS INDEX: 25.99 KG/M2 | WEIGHT: 156 LBS | DIASTOLIC BLOOD PRESSURE: 65 MMHG | RESPIRATION RATE: 18 BRPM | HEIGHT: 65 IN | SYSTOLIC BLOOD PRESSURE: 145 MMHG

## 2022-11-09 DIAGNOSIS — M25.511 CHRONIC RIGHT SHOULDER PAIN: Chronic | ICD-10-CM

## 2022-11-09 DIAGNOSIS — Z79.899 ENCOUNTER FOR LONG-TERM (CURRENT) USE OF OTHER MEDICATIONS: ICD-10-CM

## 2022-11-09 DIAGNOSIS — M54.12 CERVICAL RADICULOPATHY: Primary | Chronic | ICD-10-CM

## 2022-11-09 DIAGNOSIS — G89.29 CHRONIC RIGHT SHOULDER PAIN: Chronic | ICD-10-CM

## 2022-11-09 DIAGNOSIS — M89.49 OSTEOARTHROSIS MULTIPLE SITES, NOT SPECIFIED AS GENERALIZED: Chronic | ICD-10-CM

## 2022-11-09 DIAGNOSIS — Z71.89 COMPLEX CARE COORDINATION: ICD-10-CM

## 2022-11-09 LAB

## 2022-11-09 PROCEDURE — 99215 OFFICE O/P EST HI 40 MIN: CPT | Mod: PBBFAC | Performed by: PHYSICIAN ASSISTANT

## 2022-11-09 PROCEDURE — 3008F PR BODY MASS INDEX (BMI) DOCUMENTED: ICD-10-PCS | Mod: CPTII,,, | Performed by: PHYSICIAN ASSISTANT

## 2022-11-09 PROCEDURE — 3061F NEG MICROALBUMINURIA REV: CPT | Mod: CPTII,,, | Performed by: PHYSICIAN ASSISTANT

## 2022-11-09 PROCEDURE — 3066F PR DOCUMENTATION OF TREATMENT FOR NEPHROPATHY: ICD-10-PCS | Mod: CPTII,,, | Performed by: PHYSICIAN ASSISTANT

## 2022-11-09 PROCEDURE — 4010F PR ACE/ARB THEARPY RXD/TAKEN: ICD-10-PCS | Mod: CPTII,,, | Performed by: PHYSICIAN ASSISTANT

## 2022-11-09 PROCEDURE — 3061F PR NEG MICROALBUMINURIA RESULT DOCUMENTED/REVIEW: ICD-10-PCS | Mod: CPTII,,, | Performed by: PHYSICIAN ASSISTANT

## 2022-11-09 PROCEDURE — 3078F DIAST BP <80 MM HG: CPT | Mod: CPTII,,, | Performed by: PHYSICIAN ASSISTANT

## 2022-11-09 PROCEDURE — 3066F NEPHROPATHY DOC TX: CPT | Mod: CPTII,,, | Performed by: PHYSICIAN ASSISTANT

## 2022-11-09 PROCEDURE — 99214 OFFICE O/P EST MOD 30 MIN: CPT | Mod: S$PBB,,, | Performed by: PHYSICIAN ASSISTANT

## 2022-11-09 PROCEDURE — 80305 DRUG TEST PRSMV DIR OPT OBS: CPT | Mod: PBBFAC | Performed by: PHYSICIAN ASSISTANT

## 2022-11-09 PROCEDURE — 1159F MED LIST DOCD IN RCRD: CPT | Mod: CPTII,,, | Performed by: PHYSICIAN ASSISTANT

## 2022-11-09 PROCEDURE — 99214 PR OFFICE/OUTPT VISIT, EST, LEVL IV, 30-39 MIN: ICD-10-PCS | Mod: S$PBB,,, | Performed by: PHYSICIAN ASSISTANT

## 2022-11-09 PROCEDURE — 1159F PR MEDICATION LIST DOCUMENTED IN MEDICAL RECORD: ICD-10-PCS | Mod: CPTII,,, | Performed by: PHYSICIAN ASSISTANT

## 2022-11-09 PROCEDURE — 3078F PR MOST RECENT DIASTOLIC BLOOD PRESSURE < 80 MM HG: ICD-10-PCS | Mod: CPTII,,, | Performed by: PHYSICIAN ASSISTANT

## 2022-11-09 PROCEDURE — 3077F PR MOST RECENT SYSTOLIC BLOOD PRESSURE >= 140 MM HG: ICD-10-PCS | Mod: CPTII,,, | Performed by: PHYSICIAN ASSISTANT

## 2022-11-09 PROCEDURE — 3008F BODY MASS INDEX DOCD: CPT | Mod: CPTII,,, | Performed by: PHYSICIAN ASSISTANT

## 2022-11-09 PROCEDURE — 3077F SYST BP >= 140 MM HG: CPT | Mod: CPTII,,, | Performed by: PHYSICIAN ASSISTANT

## 2022-11-09 PROCEDURE — 4010F ACE/ARB THERAPY RXD/TAKEN: CPT | Mod: CPTII,,, | Performed by: PHYSICIAN ASSISTANT

## 2022-11-09 RX ORDER — GABAPENTIN 100 MG/1
100 CAPSULE ORAL EVERY 8 HOURS
Qty: 90 CAPSULE | Refills: 0 | Status: SHIPPED | OUTPATIENT
Start: 2022-11-09 | End: 2022-12-15 | Stop reason: SDUPTHER

## 2022-11-09 RX ORDER — HYDROCODONE BITARTRATE AND ACETAMINOPHEN 5; 325 MG/1; MG/1
1 TABLET ORAL EVERY 12 HOURS PRN
Qty: 30 TABLET | Refills: 0 | Status: SHIPPED | OUTPATIENT
Start: 2022-11-09 | End: 2022-12-15 | Stop reason: SDUPTHER

## 2022-11-09 NOTE — H&P (VIEW-ONLY)
Subjective:         Patient ID: Amador Callahan is a 61 y.o. male.    Chief Complaint: Shoulder Pain      Pain  This is a chronic problem. The current episode started more than 1 year ago. The problem occurs daily. The problem has been unchanged. Associated symptoms include arthralgias, chest pain and neck pain. Pertinent negatives include no anorexia, chills, coughing, diaphoresis, fever, sore throat, swollen glands, vertigo or vomiting.   Review of Systems   Constitutional:  Negative for activity change, appetite change, chills, diaphoresis, fever and unexpected weight change.   HENT:  Negative for drooling, ear discharge, ear pain, facial swelling, nosebleeds, sore throat, trouble swallowing, voice change and goiter.    Eyes:  Negative for photophobia, pain, discharge, redness and visual disturbance.   Respiratory:  Negative for apnea, cough, choking, chest tightness, shortness of breath, wheezing and stridor.    Cardiovascular:  Positive for chest pain. Negative for palpitations and leg swelling.   Gastrointestinal:  Negative for abdominal distention, anorexia, diarrhea, rectal pain, vomiting and fecal incontinence.   Endocrine: Negative for cold intolerance, heat intolerance, polydipsia, polyphagia and polyuria.   Genitourinary:  Negative for bladder incontinence, dysuria, flank pain and frequency.   Musculoskeletal:  Positive for arthralgias, back pain, leg pain and neck pain.   Integumentary:  Negative for color change and pallor.   Neurological:  Negative for dizziness, vertigo, seizures, syncope, facial asymmetry, speech difficulty, light-headedness, coordination difficulties, memory loss and coordination difficulties.   Hematological:  Negative for adenopathy. Does not bruise/bleed easily.   Psychiatric/Behavioral:  Negative for agitation, behavioral problems, confusion, decreased concentration, dysphoric mood, hallucinations, self-injury and suicidal ideas. The patient is not nervous/anxious and is not  "hyperactive.          Past Medical History:   Diagnosis Date    Adenomatous polyp of descending colon 2021    Anxiety     Diverticula, colon 2021    GERD (gastroesophageal reflux disease)     History of cerebrovascular accident     Hyperlipidemia     Hypertension     Screening for malignant neoplasm of colon 2021     Past Surgical History:   Procedure Laterality Date    heart cath       Social History     Socioeconomic History    Marital status: Single   Tobacco Use    Smoking status: Former     Packs/day: 1.00     Years: 2.00     Pack years: 2.00     Types: Cigarettes     Start date:      Quit date:      Years since quittin.8    Smokeless tobacco: Never   Substance and Sexual Activity    Alcohol use: Never    Drug use: Never    Sexual activity: Not Currently     Family History   Problem Relation Age of Onset    Heart disease Mother     Cancer Father     Cancer Sister      Review of patient's allergies indicates:  No Known Allergies     Objective:  Vitals:    22 1313   BP: (!) 145/65   Resp: 18   Weight: 70.8 kg (156 lb)   Height: 5' 5" (1.651 m)   PainSc:   6         Physical Exam  Vitals and nursing note reviewed. Exam conducted with a chaperone present.   Constitutional:       General: He is awake. He is not in acute distress.     Appearance: Normal appearance. He is not ill-appearing, toxic-appearing or diaphoretic.   HENT:      Head: Normocephalic and atraumatic.      Nose: Nose normal.      Mouth/Throat:      Mouth: Mucous membranes are moist.      Pharynx: Oropharynx is clear.   Eyes:      Conjunctiva/sclera: Conjunctivae normal.      Pupils: Pupils are equal, round, and reactive to light.   Cardiovascular:      Rate and Rhythm: Normal rate.   Pulmonary:      Effort: Pulmonary effort is normal. No respiratory distress.   Abdominal:      Palpations: Abdomen is soft.      Tenderness: There is no guarding.   Musculoskeletal:         General: Normal range of motion.      Cervical " back: Normal range of motion and neck supple. No rigidity.   Skin:     General: Skin is warm and dry.      Coloration: Skin is not jaundiced or pale.   Neurological:      General: No focal deficit present.      Mental Status: He is alert and oriented to person, place, and time. Mental status is at baseline.      Cranial Nerves: No cranial nerve deficit (II-XII).   Psychiatric:         Mood and Affect: Mood normal.         Behavior: Behavior normal. Behavior is cooperative.         Thought Content: Thought content normal.         MRI Shoulder Without Contrast Right  Narrative: EXAMINATION:  MRI SHOULDER WITHOUT CONTRAST RIGHT    CLINICAL HISTORY:  Pain in right shoulderRIGHT SHOULDER PAIN;    COMPARISON:  Right shoulder x-ray July 18 2022    TECHNIQUE:  Magnetic resonance imaging of the right shoulder was performed without the use of intravenous contrast.    FINDINGS:  Acromion and acromioclavicular joint: There are mild degenerative changes in the acromioclavicular joint. The acromial undersurface is essentially flat.    Subacromial space: There is no evidence of significant abnormal   fluid in the subacromial/subdeltoid bursa.    Rotator cuff: Low-grade bursal surface tearing of the supraspinatus and infraspinatus tendons.    Tendon of the long head of the biceps: There is increased signal within the intra-articular portion of the long head of biceps tendon extending to the biceps labral complex consistent with tendinopathy.    Labrum and labral anchor: There is irregularity of the posterosuperior labrum which may reflect sequela of chronic SLAP tear.    Osseous structures: The bone marrow signal is grossly unremarkable.    Glenohumeral Joint: There is no evidence of significant glenohumeral joint effusion.  Mild degenerative change of the glenohumeral joint with some cartilage loss.  Impression: Low-grade bursal surface tearing of the supraspinatus and infraspinatus tendons.    Mild degenerative change of the  acromioclavicular and glenohumeral joints.    There is increased signal within the intra-articular portion of the long head of biceps tendon extending to the biceps labral complex consistent with tendinopathy.There is irregularity of the posterosuperior labrum which may reflect sequela of chronic SLAP tear.    Point of Service: [facility]    Electronically signed by: Samson Tenorio  Date:    08/03/2022  Time:    10:41       Office Visit on 09/07/2022   Component Date Value Ref Range Status    PSA Total 09/07/2022 0.284  0.000 - 4.100 ng/mL Final    Hepatitis C Ab 09/07/2022 Non-Reactive  Non-Reactive Final   Office Visit on 07/18/2022   Component Date Value Ref Range Status    POC Amphetamines 07/18/2022 Negative  Negative, Inconclusive Final    POC Barbiturates 07/18/2022 Negative  Negative, Inconclusive Final    POC Benzodiazepines 07/18/2022 Negative  Negative, Inconclusive Final    POC Cocaine 07/18/2022 Negative  Negative, Inconclusive Final    POC THC 07/18/2022 Negative  Negative, Inconclusive Final    POC Methadone 07/18/2022 Negative  Negative, Inconclusive Final    POC Methamphetamine 07/18/2022 Negative  Negative, Inconclusive Final    POC Opiates 07/18/2022 Negative  Negative, Inconclusive Final    POC Oxycodone 07/18/2022 Negative  Negative, Inconclusive Final    POC Phencyclidine 07/18/2022 Negative  Negative, Inconclusive Final    POC Methylenedioxymethamphetamine * 07/18/2022 Negative  Negative, Inconclusive Final    POC Tricyclic Antidepressants 07/18/2022 Negative  Negative, Inconclusive Final    POC Buprenorphine 07/18/2022 Negative   Final     Acceptable 07/18/2022 Yes   Final    POC Temperature (Urine) 07/18/2022 92   Final    pH, UA 07/18/2022 6.0  5.0 to 8.0 pH Units Final    Creatinine, Urine 07/18/2022 83  39 - 259 mg/dL Final    6-Acetylmorphine 07/18/2022 Negative  10 ng/mL Final    7-Aminoclonazepam 07/18/2022 Negative  Negative 25 ng/mL Final     a-Hydroxyalprazolam 07/18/2022 Negative  Negative 25 ng/mL Final    Acetyl Fentanyl 07/18/2022 Negative  2.5 ng/mL Final    Acetyl Norfentanyl Oxalate 07/18/2022 Negative  5 ng/mL Final    Benzoylecgonine 07/18/2022 Negative  100 ng/mL Final    Buprenorphine 07/18/2022 Negative  25 ng/mL Final    Codeine 07/18/2022 Negative  25 ng/mL Final    EDDP 07/18/2022 Negative  25 ng/mL Final    Fentanyl 07/18/2022 Negative  2.5 ng/mL Final    Hydrocodone 07/18/2022 Negative  25 ng/mL Final    Hydromorphone 07/18/2022 Negative  25 ng/mL Final    Lorazepam 07/18/2022 Negative  25 ng/mL Final    Morphine 07/18/2022 Negative  25 ng/mL Final    Norbuprenorphine 07/18/2022 Negative  25 ng/mL Final    Nordiazepam 07/18/2022 Negative  25 ng/mL Final    Norfentanyl Oxalate 07/18/2022 Negative  5 ng/mL Final    Norhydrocodone 07/18/2022 Negative  50 ng/mL Final    Noroxycodone HCL 07/18/2022 Negative  50 ng/mL Final    Oxazepam 07/18/2022 Negative  25 ng/mL Final    Oxymorphone 07/18/2022 Negative  25 ng/mL Final    Tapentadol 07/18/2022 Negative  25 ng/mL Final    Temazepam 07/18/2022 Negative  25 ng/mL Final    THC-COOH 07/18/2022 Negative  25 ng/mL Final    Tramadol 07/18/2022 Negative  100 ng/mL Final    Amphetamine, Urine 07/18/2022 Negative  Negative Final    Methamphetamines, Urine 07/18/2022 Negative  Negative Final    Methadone, Urine 07/18/2022 Negative  Negative 25 ng/mL Final    Oxycodone, Urine 07/18/2022 Negative  Negative 25 ng/mL Final    Specific Gravity, UA 07/18/2022 1.010  <=1.030 Final   Office Visit on 06/02/2022   Component Date Value Ref Range Status    Creatinine, Urine 06/02/2022 137  39 - 259 mg/dL Final    Microalbumin 06/02/2022 1.5  0.0 - 2.8 mg/dL Final    Microalbumin/Creatinine Ratio 06/02/2022 10.9  0.0 - 30.0 mg/g Final    Sodium 06/02/2022 140  136 - 145 mmol/L Final    Potassium 06/02/2022 4.1  3.5 - 5.1 mmol/L Final    Chloride 06/02/2022 104  98 - 107 mmol/L Final    CO2 06/02/2022 28  21 - 32  mmol/L Final    Anion Gap 06/02/2022 12  7 - 16 mmol/L Final    Glucose 06/02/2022 105  74 - 106 mg/dL Final    BUN 06/02/2022 11  7 - 18 mg/dL Final    Creatinine 06/02/2022 1.00  0.70 - 1.30 mg/dL Final    BUN/Creatinine Ratio 06/02/2022 11  6 - 20 Final    Calcium 06/02/2022 9.0  8.5 - 10.1 mg/dL Final    Total Protein 06/02/2022 8.1  6.4 - 8.2 g/dL Final    Albumin 06/02/2022 3.8  3.5 - 5.0 g/dL Final    Globulin 06/02/2022 4.3 (H)  2.0 - 4.0 g/dL Final    A/G Ratio 06/02/2022 0.9   Final    Bilirubin, Total 06/02/2022 0.5  0.0 - 1.2 mg/dL Final    Alk Phos 06/02/2022 161 (H)  45 - 115 U/L Final    ALT 06/02/2022 78 (H)  16 - 61 U/L Final    AST 06/02/2022 32  15 - 37 U/L Final    eGFR 06/02/2022 80  >=60 mL/min/1.73m² Final    Triglycerides 06/02/2022 123  35 - 150 mg/dL Final    Cholesterol 06/02/2022 150  0 - 200 mg/dL Final    HDL Cholesterol 06/02/2022 42  40 - 60 mg/dL Final    Cholesterol/HDL Ratio (Risk Factor) 06/02/2022 3.6   Final    Non-HDL 06/02/2022 108  mg/dL Final    LDL Calculated 06/02/2022 83  mg/dL Final    LDL/HDL 06/02/2022 2.0   Final    VLDL 06/02/2022 25  mg/dL Final    WBC 06/02/2022 8.18  4.50 - 11.00 K/uL Final    RBC 06/02/2022 5.46  4.60 - 6.20 M/uL Final    Hemoglobin 06/02/2022 14.8  13.5 - 18.0 g/dL Final    Hematocrit 06/02/2022 46.4  40.0 - 54.0 % Final    MCV 06/02/2022 85.0  80.0 - 96.0 fL Final    MCH 06/02/2022 27.1  27.0 - 31.0 pg Final    MCHC 06/02/2022 31.9 (L)  32.0 - 36.0 g/dL Final    RDW 06/02/2022 12.5  11.5 - 14.5 % Final    Platelet Count 06/02/2022 252  150 - 400 K/uL Final    MPV 06/02/2022 11.1  9.4 - 12.4 fL Final    Neutrophils % 06/02/2022 66.9 (H)  53.0 - 65.0 % Final    Lymphocytes % 06/02/2022 20.9 (L)  27.0 - 41.0 % Final    Monocytes % 06/02/2022 7.1 (H)  2.0 - 6.0 % Final    Eosinophils % 06/02/2022 4.3 (H)  1.0 - 4.0 % Final    Basophils % 06/02/2022 0.6  0.0 - 1.0 % Final    Immature Granulocytes % 06/02/2022 0.2  0.0 - 0.4 % Final    nRBC, Auto  06/02/2022 0.0  <=0.0 % Final    Neutrophils, Abs 06/02/2022 5.47  1.80 - 7.70 K/uL Final    Lymphocytes, Absolute 06/02/2022 1.71  1.00 - 4.80 K/uL Final    Monocytes, Absolute 06/02/2022 0.58  0.00 - 0.80 K/uL Final    Eosinophils, Absolute 06/02/2022 0.35  0.00 - 0.50 K/uL Final    Basophils, Absolute 06/02/2022 0.05  0.00 - 0.20 K/uL Final    Immature Granulocytes, Absolute 06/02/2022 0.02  0.00 - 0.04 K/uL Final    nRBC, Absolute 06/02/2022 0.00  <=0.00 x10e3/uL Final    Diff Type 06/02/2022 Auto   Final         Orders Placed This Encounter   Procedures    POCT Urine Drug Screen Presump     Interpretive Information:     Negative:  No drug detected at the cut off level.   Positive:  This result represents presumptive positive for the   tested drug, other substances may yield a positive response other   than the analyte of interest. This result should be utilized for   diagnostic purpose only. Confirmation testing will be performed upon physician request only.       Case Request Operating Room: Injection-steroid-epidural-cervical, C4/5 EDE     Order Specific Question:   Medical Necessity:     Answer:   Medically Non-Urgent [100]     Order Specific Question:   CPT Code:     Answer:   MI INJ CERV/THORAC, W/GUIDANCE [38857]     Order Specific Question:   Is an on-site pathologist required for this procedure?     Answer:   N/A           Requested Prescriptions     Signed Prescriptions Disp Refills    gabapentin (NEURONTIN) 100 MG capsule 90 capsule 0     Sig: Take 1 capsule (100 mg total) by mouth every 8 (eight) hours.    HYDROcodone-acetaminophen (NORCO) 5-325 mg per tablet 30 tablet 0     Sig: Take 1 tablet by mouth every 12 (twelve) hours as needed for Pain.       Assessment:     1. Cervical radiculopathy MRI May 2022    2. Chronic right shoulder pain    3. Osteoarthrosis multiple sites, not specified as generalized    4. Encounter for long-term (current) use of other medications         A's of Opioid Risk  Assessment  Activity:Patient can perform ADL.   Analgesia:Patients pain is partially controlled by current medication. Patient has tried OTC medications such as Tylenol and Ibuprofen with out relief.   Adverse Effects: Patient denies constipation or sedation.  Aberrant Behavior:  reviewed with no aberrant drug seeking/taking behavior.  Overdose reversal drug naloxone discussed    Drug screen reviewed      Plan:    Presumptive drug screen negative today, patient is 8 days passed his refill date due to scheduling issues in the office not his fault  Has not had medication for 8 days    Bell's palsy left face    Had to postpone procedure     He is here today requesting that we reschedule his cervical spine procedure    Continues with right upper arm right shoulder right neck pain numbness and tingling requesting further    He had a right shoulder injection orthopedics [facility] he states it helped for a little while but the pain has returned    We discussed MRI cervical spine [facility] May 2022 which shows severe right neuroforaminal narrowing central canal narrowing due to herniated discs, severe right neural foraminal narrowing C5/6 multiple level degenerative change    Gabapentin 100 mg 1 p.o. q.8 hours     Continue Norco as directed     Indications for this procedure for this specific patient include the following     - Injections being provided as part of a comprehensive pain management program.    - Pt has failed 6 weeks of conservative therapy including oral meds, PT and or home exercise program which has been discussed with the patient   - Injection being provided for suspected radicular pain.    - Pain scale of greater than or equal to 3/10 with functional impairment  - No evidence of local or systemic infection, bleeding tendency or unstable medical condition.    - Pain is causing significant functional limitation resulting in diminished quality of life and impaired age appropriate ADL's.   - Repeat  injections are done no sooner than 7 days after the previous injection  - Epidural done for suspected radicular pain along dermatome of nerve   - Epidural done to differentiate level of radicular nerve root pain   - Repeat injections done only when pt reports 50% improvement in pain from previous injections    -increased level of function  - patient is aware if they take any NSAIDs/anticoagulant prior to procedure procedure will be postponed, this was listed on the preop instructions and highlighted, list of NSAIDs/anticoagulant reviewed with patient to include methotrexate  - Injection done at C4/5 level(s) which is consistent with patient's dermatomal pain complaint  The planned medically necessary  surgical procedure is performed in a hospital outpatient department and not in an ambulatory surgical center due to:     -there is no geographically assessable ambulatory surgery center that has the  necessary equipment and fluoroscopy needed for the procedure     -there is no geographically assessable ambulatory surgical center available at which the physician has privileges     -an ASC's  specific  guideline regarding the individuals weight or health conditions that prevent the use of an ASC    Monitor anesthesia request is medically indicated for the scheduled nerve block procedure due to:  1- needle phobia and anxiety, placing  the patient at risk during the provided service.  2-patient has an ASA class greater than 3 and requires constant presence of an anesthesiologist during the procedure,   3-patient has severe problems hard to lie still  4-patient suffers from chronic pain and is unable to function due to  diminished ADLs    Continue home exercise program as directed     Schedule cervical C4/5 EDE # 1, cervical radiculopathy    Dr. Hdz    Bring original prescription medication bottles/container/box with labels to each visit    Pill count    Physical therapy    Massage therapy declines

## 2022-11-09 NOTE — PATIENT INSTRUCTIONS
Procedure Instructions:    Nothing to eat or drink for 8 hours or after midnight including gum, candy, mints, or tobacco products.  If you are scheduled for 1:30 or later nothing to eat or drink after 5 a.m. the morning of the procedure, including gum, candy, mints, or tobacco products.  Must have a  at least 18 yrs of age to stay present at all times  No Diabetic medications the morning of procedure, check blood sugar the morning of procedure, if it is greater than 200 call the office at [phone]  If you are started on antibiotics or have been prescribed antibiotics, have a fever, or have any other type of infection call to reschedule procedure.  If you take blood pressure medications you can take it at your regular scheduled time with a small sip of WATER!    HOLD ASPIRIN AND ASPIRIN PRODUCTS  (ASPIRIN, BC POWDER ETC. ) FOR 7 DAYS  PRIOR TO PROCEDURE  HOLD NSAIDS( ibuprofen, mobic, meloxicam, advil, diclofenac, naproxen, relafen, celebrex,  methotrexate, aleve etc....)  FOR 3 DAYS   PRIOR TO PROCEDURE

## 2022-11-09 NOTE — PROGRESS NOTES
Subjective:         Patient ID: Amador Callahan is a 61 y.o. male.    Chief Complaint: Shoulder Pain      Pain  This is a chronic problem. The current episode started more than 1 year ago. The problem occurs daily. The problem has been unchanged. Associated symptoms include arthralgias, chest pain and neck pain. Pertinent negatives include no anorexia, chills, coughing, diaphoresis, fever, sore throat, swollen glands, vertigo or vomiting.   Review of Systems   Constitutional:  Negative for activity change, appetite change, chills, diaphoresis, fever and unexpected weight change.   HENT:  Negative for drooling, ear discharge, ear pain, facial swelling, nosebleeds, sore throat, trouble swallowing, voice change and goiter.    Eyes:  Negative for photophobia, pain, discharge, redness and visual disturbance.   Respiratory:  Negative for apnea, cough, choking, chest tightness, shortness of breath, wheezing and stridor.    Cardiovascular:  Positive for chest pain. Negative for palpitations and leg swelling.   Gastrointestinal:  Negative for abdominal distention, anorexia, diarrhea, rectal pain, vomiting and fecal incontinence.   Endocrine: Negative for cold intolerance, heat intolerance, polydipsia, polyphagia and polyuria.   Genitourinary:  Negative for bladder incontinence, dysuria, flank pain and frequency.   Musculoskeletal:  Positive for arthralgias, back pain, leg pain and neck pain.   Integumentary:  Negative for color change and pallor.   Neurological:  Negative for dizziness, vertigo, seizures, syncope, facial asymmetry, speech difficulty, light-headedness, coordination difficulties, memory loss and coordination difficulties.   Hematological:  Negative for adenopathy. Does not bruise/bleed easily.   Psychiatric/Behavioral:  Negative for agitation, behavioral problems, confusion, decreased concentration, dysphoric mood, hallucinations, self-injury and suicidal ideas. The patient is not nervous/anxious and is not  "hyperactive.          Past Medical History:   Diagnosis Date    Adenomatous polyp of descending colon 2021    Anxiety     Diverticula, colon 2021    GERD (gastroesophageal reflux disease)     History of cerebrovascular accident     Hyperlipidemia     Hypertension     Screening for malignant neoplasm of colon 2021     Past Surgical History:   Procedure Laterality Date    heart cath       Social History     Socioeconomic History    Marital status: Single   Tobacco Use    Smoking status: Former     Packs/day: 1.00     Years: 2.00     Pack years: 2.00     Types: Cigarettes     Start date:      Quit date:      Years since quittin.8    Smokeless tobacco: Never   Substance and Sexual Activity    Alcohol use: Never    Drug use: Never    Sexual activity: Not Currently     Family History   Problem Relation Age of Onset    Heart disease Mother     Cancer Father     Cancer Sister      Review of patient's allergies indicates:  No Known Allergies     Objective:  Vitals:    22 1313   BP: (!) 145/65   Resp: 18   Weight: 70.8 kg (156 lb)   Height: 5' 5" (1.651 m)   PainSc:   6         Physical Exam  Vitals and nursing note reviewed. Exam conducted with a chaperone present.   Constitutional:       General: He is awake. He is not in acute distress.     Appearance: Normal appearance. He is not ill-appearing, toxic-appearing or diaphoretic.   HENT:      Head: Normocephalic and atraumatic.      Nose: Nose normal.      Mouth/Throat:      Mouth: Mucous membranes are moist.      Pharynx: Oropharynx is clear.   Eyes:      Conjunctiva/sclera: Conjunctivae normal.      Pupils: Pupils are equal, round, and reactive to light.   Cardiovascular:      Rate and Rhythm: Normal rate.   Pulmonary:      Effort: Pulmonary effort is normal. No respiratory distress.   Abdominal:      Palpations: Abdomen is soft.      Tenderness: There is no guarding.   Musculoskeletal:         General: Normal range of motion.      Cervical " back: Normal range of motion and neck supple. No rigidity.   Skin:     General: Skin is warm and dry.      Coloration: Skin is not jaundiced or pale.   Neurological:      General: No focal deficit present.      Mental Status: He is alert and oriented to person, place, and time. Mental status is at baseline.      Cranial Nerves: No cranial nerve deficit (II-XII).   Psychiatric:         Mood and Affect: Mood normal.         Behavior: Behavior normal. Behavior is cooperative.         Thought Content: Thought content normal.         MRI Shoulder Without Contrast Right  Narrative: EXAMINATION:  MRI SHOULDER WITHOUT CONTRAST RIGHT    CLINICAL HISTORY:  Pain in right shoulderRIGHT SHOULDER PAIN;    COMPARISON:  Right shoulder x-ray July 18 2022    TECHNIQUE:  Magnetic resonance imaging of the right shoulder was performed without the use of intravenous contrast.    FINDINGS:  Acromion and acromioclavicular joint: There are mild degenerative changes in the acromioclavicular joint. The acromial undersurface is essentially flat.    Subacromial space: There is no evidence of significant abnormal   fluid in the subacromial/subdeltoid bursa.    Rotator cuff: Low-grade bursal surface tearing of the supraspinatus and infraspinatus tendons.    Tendon of the long head of the biceps: There is increased signal within the intra-articular portion of the long head of biceps tendon extending to the biceps labral complex consistent with tendinopathy.    Labrum and labral anchor: There is irregularity of the posterosuperior labrum which may reflect sequela of chronic SLAP tear.    Osseous structures: The bone marrow signal is grossly unremarkable.    Glenohumeral Joint: There is no evidence of significant glenohumeral joint effusion.  Mild degenerative change of the glenohumeral joint with some cartilage loss.  Impression: Low-grade bursal surface tearing of the supraspinatus and infraspinatus tendons.    Mild degenerative change of the  acromioclavicular and glenohumeral joints.    There is increased signal within the intra-articular portion of the long head of biceps tendon extending to the biceps labral complex consistent with tendinopathy.There is irregularity of the posterosuperior labrum which may reflect sequela of chronic SLAP tear.    Point of Service: [facility]    Electronically signed by: Samson Tenorio  Date:    08/03/2022  Time:    10:41       Office Visit on 09/07/2022   Component Date Value Ref Range Status    PSA Total 09/07/2022 0.284  0.000 - 4.100 ng/mL Final    Hepatitis C Ab 09/07/2022 Non-Reactive  Non-Reactive Final   Office Visit on 07/18/2022   Component Date Value Ref Range Status    POC Amphetamines 07/18/2022 Negative  Negative, Inconclusive Final    POC Barbiturates 07/18/2022 Negative  Negative, Inconclusive Final    POC Benzodiazepines 07/18/2022 Negative  Negative, Inconclusive Final    POC Cocaine 07/18/2022 Negative  Negative, Inconclusive Final    POC THC 07/18/2022 Negative  Negative, Inconclusive Final    POC Methadone 07/18/2022 Negative  Negative, Inconclusive Final    POC Methamphetamine 07/18/2022 Negative  Negative, Inconclusive Final    POC Opiates 07/18/2022 Negative  Negative, Inconclusive Final    POC Oxycodone 07/18/2022 Negative  Negative, Inconclusive Final    POC Phencyclidine 07/18/2022 Negative  Negative, Inconclusive Final    POC Methylenedioxymethamphetamine * 07/18/2022 Negative  Negative, Inconclusive Final    POC Tricyclic Antidepressants 07/18/2022 Negative  Negative, Inconclusive Final    POC Buprenorphine 07/18/2022 Negative   Final     Acceptable 07/18/2022 Yes   Final    POC Temperature (Urine) 07/18/2022 92   Final    pH, UA 07/18/2022 6.0  5.0 to 8.0 pH Units Final    Creatinine, Urine 07/18/2022 83  39 - 259 mg/dL Final    6-Acetylmorphine 07/18/2022 Negative  10 ng/mL Final    7-Aminoclonazepam 07/18/2022 Negative  Negative 25 ng/mL Final     a-Hydroxyalprazolam 07/18/2022 Negative  Negative 25 ng/mL Final    Acetyl Fentanyl 07/18/2022 Negative  2.5 ng/mL Final    Acetyl Norfentanyl Oxalate 07/18/2022 Negative  5 ng/mL Final    Benzoylecgonine 07/18/2022 Negative  100 ng/mL Final    Buprenorphine 07/18/2022 Negative  25 ng/mL Final    Codeine 07/18/2022 Negative  25 ng/mL Final    EDDP 07/18/2022 Negative  25 ng/mL Final    Fentanyl 07/18/2022 Negative  2.5 ng/mL Final    Hydrocodone 07/18/2022 Negative  25 ng/mL Final    Hydromorphone 07/18/2022 Negative  25 ng/mL Final    Lorazepam 07/18/2022 Negative  25 ng/mL Final    Morphine 07/18/2022 Negative  25 ng/mL Final    Norbuprenorphine 07/18/2022 Negative  25 ng/mL Final    Nordiazepam 07/18/2022 Negative  25 ng/mL Final    Norfentanyl Oxalate 07/18/2022 Negative  5 ng/mL Final    Norhydrocodone 07/18/2022 Negative  50 ng/mL Final    Noroxycodone HCL 07/18/2022 Negative  50 ng/mL Final    Oxazepam 07/18/2022 Negative  25 ng/mL Final    Oxymorphone 07/18/2022 Negative  25 ng/mL Final    Tapentadol 07/18/2022 Negative  25 ng/mL Final    Temazepam 07/18/2022 Negative  25 ng/mL Final    THC-COOH 07/18/2022 Negative  25 ng/mL Final    Tramadol 07/18/2022 Negative  100 ng/mL Final    Amphetamine, Urine 07/18/2022 Negative  Negative Final    Methamphetamines, Urine 07/18/2022 Negative  Negative Final    Methadone, Urine 07/18/2022 Negative  Negative 25 ng/mL Final    Oxycodone, Urine 07/18/2022 Negative  Negative 25 ng/mL Final    Specific Gravity, UA 07/18/2022 1.010  <=1.030 Final   Office Visit on 06/02/2022   Component Date Value Ref Range Status    Creatinine, Urine 06/02/2022 137  39 - 259 mg/dL Final    Microalbumin 06/02/2022 1.5  0.0 - 2.8 mg/dL Final    Microalbumin/Creatinine Ratio 06/02/2022 10.9  0.0 - 30.0 mg/g Final    Sodium 06/02/2022 140  136 - 145 mmol/L Final    Potassium 06/02/2022 4.1  3.5 - 5.1 mmol/L Final    Chloride 06/02/2022 104  98 - 107 mmol/L Final    CO2 06/02/2022 28  21 - 32  mmol/L Final    Anion Gap 06/02/2022 12  7 - 16 mmol/L Final    Glucose 06/02/2022 105  74 - 106 mg/dL Final    BUN 06/02/2022 11  7 - 18 mg/dL Final    Creatinine 06/02/2022 1.00  0.70 - 1.30 mg/dL Final    BUN/Creatinine Ratio 06/02/2022 11  6 - 20 Final    Calcium 06/02/2022 9.0  8.5 - 10.1 mg/dL Final    Total Protein 06/02/2022 8.1  6.4 - 8.2 g/dL Final    Albumin 06/02/2022 3.8  3.5 - 5.0 g/dL Final    Globulin 06/02/2022 4.3 (H)  2.0 - 4.0 g/dL Final    A/G Ratio 06/02/2022 0.9   Final    Bilirubin, Total 06/02/2022 0.5  0.0 - 1.2 mg/dL Final    Alk Phos 06/02/2022 161 (H)  45 - 115 U/L Final    ALT 06/02/2022 78 (H)  16 - 61 U/L Final    AST 06/02/2022 32  15 - 37 U/L Final    eGFR 06/02/2022 80  >=60 mL/min/1.73m² Final    Triglycerides 06/02/2022 123  35 - 150 mg/dL Final    Cholesterol 06/02/2022 150  0 - 200 mg/dL Final    HDL Cholesterol 06/02/2022 42  40 - 60 mg/dL Final    Cholesterol/HDL Ratio (Risk Factor) 06/02/2022 3.6   Final    Non-HDL 06/02/2022 108  mg/dL Final    LDL Calculated 06/02/2022 83  mg/dL Final    LDL/HDL 06/02/2022 2.0   Final    VLDL 06/02/2022 25  mg/dL Final    WBC 06/02/2022 8.18  4.50 - 11.00 K/uL Final    RBC 06/02/2022 5.46  4.60 - 6.20 M/uL Final    Hemoglobin 06/02/2022 14.8  13.5 - 18.0 g/dL Final    Hematocrit 06/02/2022 46.4  40.0 - 54.0 % Final    MCV 06/02/2022 85.0  80.0 - 96.0 fL Final    MCH 06/02/2022 27.1  27.0 - 31.0 pg Final    MCHC 06/02/2022 31.9 (L)  32.0 - 36.0 g/dL Final    RDW 06/02/2022 12.5  11.5 - 14.5 % Final    Platelet Count 06/02/2022 252  150 - 400 K/uL Final    MPV 06/02/2022 11.1  9.4 - 12.4 fL Final    Neutrophils % 06/02/2022 66.9 (H)  53.0 - 65.0 % Final    Lymphocytes % 06/02/2022 20.9 (L)  27.0 - 41.0 % Final    Monocytes % 06/02/2022 7.1 (H)  2.0 - 6.0 % Final    Eosinophils % 06/02/2022 4.3 (H)  1.0 - 4.0 % Final    Basophils % 06/02/2022 0.6  0.0 - 1.0 % Final    Immature Granulocytes % 06/02/2022 0.2  0.0 - 0.4 % Final    nRBC, Auto  06/02/2022 0.0  <=0.0 % Final    Neutrophils, Abs 06/02/2022 5.47  1.80 - 7.70 K/uL Final    Lymphocytes, Absolute 06/02/2022 1.71  1.00 - 4.80 K/uL Final    Monocytes, Absolute 06/02/2022 0.58  0.00 - 0.80 K/uL Final    Eosinophils, Absolute 06/02/2022 0.35  0.00 - 0.50 K/uL Final    Basophils, Absolute 06/02/2022 0.05  0.00 - 0.20 K/uL Final    Immature Granulocytes, Absolute 06/02/2022 0.02  0.00 - 0.04 K/uL Final    nRBC, Absolute 06/02/2022 0.00  <=0.00 x10e3/uL Final    Diff Type 06/02/2022 Auto   Final         Orders Placed This Encounter   Procedures    POCT Urine Drug Screen Presump     Interpretive Information:     Negative:  No drug detected at the cut off level.   Positive:  This result represents presumptive positive for the   tested drug, other substances may yield a positive response other   than the analyte of interest. This result should be utilized for   diagnostic purpose only. Confirmation testing will be performed upon physician request only.       Case Request Operating Room: Injection-steroid-epidural-cervical, C4/5 EDE     Order Specific Question:   Medical Necessity:     Answer:   Medically Non-Urgent [100]     Order Specific Question:   CPT Code:     Answer:   NE INJ CERV/THORAC, W/GUIDANCE [53343]     Order Specific Question:   Is an on-site pathologist required for this procedure?     Answer:   N/A           Requested Prescriptions     Signed Prescriptions Disp Refills    gabapentin (NEURONTIN) 100 MG capsule 90 capsule 0     Sig: Take 1 capsule (100 mg total) by mouth every 8 (eight) hours.    HYDROcodone-acetaminophen (NORCO) 5-325 mg per tablet 30 tablet 0     Sig: Take 1 tablet by mouth every 12 (twelve) hours as needed for Pain.       Assessment:     1. Cervical radiculopathy MRI May 2022    2. Chronic right shoulder pain    3. Osteoarthrosis multiple sites, not specified as generalized    4. Encounter for long-term (current) use of other medications         A's of Opioid Risk  Assessment  Activity:Patient can perform ADL.   Analgesia:Patients pain is partially controlled by current medication. Patient has tried OTC medications such as Tylenol and Ibuprofen with out relief.   Adverse Effects: Patient denies constipation or sedation.  Aberrant Behavior:  reviewed with no aberrant drug seeking/taking behavior.  Overdose reversal drug naloxone discussed    Drug screen reviewed      Plan:    Presumptive drug screen negative today, patient is 8 days passed his refill date due to scheduling issues in the office not his fault  Has not had medication for 8 days    Bell's palsy left face    Had to postpone procedure     He is here today requesting that we reschedule his cervical spine procedure    Continues with right upper arm right shoulder right neck pain numbness and tingling requesting further    He had a right shoulder injection orthopedics [facility] he states it helped for a little while but the pain has returned    We discussed MRI cervical spine [facility] May 2022 which shows severe right neuroforaminal narrowing central canal narrowing due to herniated discs, severe right neural foraminal narrowing C5/6 multiple level degenerative change    Gabapentin 100 mg 1 p.o. q.8 hours     Continue Norco as directed     Indications for this procedure for this specific patient include the following     - Injections being provided as part of a comprehensive pain management program.    - Pt has failed 6 weeks of conservative therapy including oral meds, PT and or home exercise program which has been discussed with the patient   - Injection being provided for suspected radicular pain.    - Pain scale of greater than or equal to 3/10 with functional impairment  - No evidence of local or systemic infection, bleeding tendency or unstable medical condition.    - Pain is causing significant functional limitation resulting in diminished quality of life and impaired age appropriate ADL's.   - Repeat  injections are done no sooner than 7 days after the previous injection  - Epidural done for suspected radicular pain along dermatome of nerve   - Epidural done to differentiate level of radicular nerve root pain   - Repeat injections done only when pt reports 50% improvement in pain from previous injections    -increased level of function  - patient is aware if they take any NSAIDs/anticoagulant prior to procedure procedure will be postponed, this was listed on the preop instructions and highlighted, list of NSAIDs/anticoagulant reviewed with patient to include methotrexate  - Injection done at C4/5 level(s) which is consistent with patient's dermatomal pain complaint  The planned medically necessary  surgical procedure is performed in a hospital outpatient department and not in an ambulatory surgical center due to:     -there is no geographically assessable ambulatory surgery center that has the  necessary equipment and fluoroscopy needed for the procedure     -there is no geographically assessable ambulatory surgical center available at which the physician has privileges     -an ASC's  specific  guideline regarding the individuals weight or health conditions that prevent the use of an ASC    Monitor anesthesia request is medically indicated for the scheduled nerve block procedure due to:  1- needle phobia and anxiety, placing  the patient at risk during the provided service.  2-patient has an ASA class greater than 3 and requires constant presence of an anesthesiologist during the procedure,   3-patient has severe problems hard to lie still  4-patient suffers from chronic pain and is unable to function due to  diminished ADLs    Continue home exercise program as directed     Schedule cervical C4/5 EDE # 1, cervical radiculopathy    Dr. Hdz    Bring original prescription medication bottles/container/box with labels to each visit    Pill count    Physical therapy    Massage therapy declines

## 2022-12-02 ENCOUNTER — HOSPITAL ENCOUNTER (OUTPATIENT)
Facility: HOSPITAL | Age: 62
Discharge: HOME | End: 2022-12-02
Attending: PAIN MEDICINE | Admitting: PAIN MEDICINE
Payer: COMMERCIAL

## 2022-12-02 ENCOUNTER — ANESTHESIA (OUTPATIENT)
Dept: PAIN MEDICINE | Facility: HOSPITAL | Age: 62
End: 2022-12-02
Payer: COMMERCIAL

## 2022-12-02 ENCOUNTER — ANESTHESIA EVENT (OUTPATIENT)
Dept: PAIN MEDICINE | Facility: HOSPITAL | Age: 62
End: 2022-12-02
Payer: COMMERCIAL

## 2022-12-02 VITALS
HEIGHT: 66 IN | SYSTOLIC BLOOD PRESSURE: 150 MMHG | TEMPERATURE: 98 F | WEIGHT: 155 LBS | DIASTOLIC BLOOD PRESSURE: 77 MMHG | RESPIRATION RATE: 22 BRPM | HEART RATE: 90 BPM | BODY MASS INDEX: 24.91 KG/M2 | OXYGEN SATURATION: 98 %

## 2022-12-02 DIAGNOSIS — R07.9 CHEST PAIN: ICD-10-CM

## 2022-12-02 DIAGNOSIS — M54.12 CERVICAL RADICULOPATHY: ICD-10-CM

## 2022-12-02 PROCEDURE — 37000008 HC ANESTHESIA 1ST 15 MINUTES: Performed by: PAIN MEDICINE

## 2022-12-02 PROCEDURE — 25000003 PHARM REV CODE 250: Performed by: NURSE ANESTHETIST, CERTIFIED REGISTERED

## 2022-12-02 PROCEDURE — 01992 ANES DX/THER NRV BLK&INJ PRN: CPT | Performed by: PAIN MEDICINE

## 2022-12-02 PROCEDURE — 93010 ELECTROCARDIOGRAM REPORT: CPT | Mod: ,,, | Performed by: HOSPITALIST

## 2022-12-02 PROCEDURE — 93010 EKG 12-LEAD: ICD-10-PCS | Mod: ,,, | Performed by: HOSPITALIST

## 2022-12-02 PROCEDURE — D9220A PRA ANESTHESIA: ICD-10-PCS | Mod: ,,, | Performed by: NURSE ANESTHETIST, CERTIFIED REGISTERED

## 2022-12-02 PROCEDURE — 37000009 HC ANESTHESIA EA ADD 15 MINS: Performed by: PAIN MEDICINE

## 2022-12-02 PROCEDURE — 93005 ELECTROCARDIOGRAM TRACING: CPT

## 2022-12-02 PROCEDURE — D9220A PRA ANESTHESIA: Mod: ,,, | Performed by: NURSE ANESTHETIST, CERTIFIED REGISTERED

## 2022-12-02 PROCEDURE — 62321 PR INJ CERV/THORAC, W/GUIDANCE: ICD-10-PCS | Mod: ,,, | Performed by: PAIN MEDICINE

## 2022-12-02 PROCEDURE — 62321 NJX INTERLAMINAR CRV/THRC: CPT | Mod: ,,, | Performed by: PAIN MEDICINE

## 2022-12-02 PROCEDURE — 25000003 PHARM REV CODE 250: Performed by: PAIN MEDICINE

## 2022-12-02 PROCEDURE — 27000284 HC CANNULA NASAL: Performed by: NURSE ANESTHETIST, CERTIFIED REGISTERED

## 2022-12-02 PROCEDURE — 63600175 PHARM REV CODE 636 W HCPCS: Performed by: PAIN MEDICINE

## 2022-12-02 PROCEDURE — 63600175 PHARM REV CODE 636 W HCPCS: Performed by: NURSE ANESTHETIST, CERTIFIED REGISTERED

## 2022-12-02 PROCEDURE — 25500020 PHARM REV CODE 255: Performed by: PAIN MEDICINE

## 2022-12-02 PROCEDURE — 62321 NJX INTERLAMINAR CRV/THRC: CPT | Performed by: PAIN MEDICINE

## 2022-12-02 RX ORDER — TRIAMCINOLONE ACETONIDE 40 MG/ML
INJECTION, SUSPENSION INTRA-ARTICULAR; INTRAMUSCULAR CODE/TRAUMA/SEDATION MEDICATION
Status: DISCONTINUED | OUTPATIENT
Start: 2022-12-02 | End: 2022-12-02 | Stop reason: HOSPADM

## 2022-12-02 RX ORDER — LIDOCAINE HYDROCHLORIDE 20 MG/ML
INJECTION, SOLUTION EPIDURAL; INFILTRATION; INTRACAUDAL; PERINEURAL CODE/TRAUMA/SEDATION MEDICATION
Status: DISCONTINUED | OUTPATIENT
Start: 2022-12-02 | End: 2022-12-02 | Stop reason: HOSPADM

## 2022-12-02 RX ORDER — SODIUM CHLORIDE 9 MG/ML
INJECTION, SOLUTION INTRAVENOUS CONTINUOUS
Status: DISCONTINUED | OUTPATIENT
Start: 2022-12-02 | End: 2022-12-02 | Stop reason: HOSPADM

## 2022-12-02 RX ORDER — PROPOFOL 10 MG/ML
VIAL (ML) INTRAVENOUS
Status: DISCONTINUED | OUTPATIENT
Start: 2022-12-02 | End: 2022-12-02

## 2022-12-02 RX ORDER — LIDOCAINE HYDROCHLORIDE 20 MG/ML
INJECTION, SOLUTION EPIDURAL; INFILTRATION; INTRACAUDAL; PERINEURAL
Status: DISCONTINUED | OUTPATIENT
Start: 2022-12-02 | End: 2022-12-02

## 2022-12-02 RX ADMIN — PROPOFOL 30 MG: 10 INJECTION, EMULSION INTRAVENOUS at 12:12

## 2022-12-02 RX ADMIN — SODIUM CHLORIDE: 9 INJECTION, SOLUTION INTRAVENOUS at 12:12

## 2022-12-02 RX ADMIN — PROPOFOL 50 MG: 10 INJECTION, EMULSION INTRAVENOUS at 12:12

## 2022-12-02 RX ADMIN — LIDOCAINE HYDROCHLORIDE 50 MG: 20 INJECTION, SOLUTION EPIDURAL; INFILTRATION; INTRACAUDAL; PERINEURAL at 12:12

## 2022-12-02 NOTE — PLAN OF CARE
Plan:  D/c pt via wheelchair at 1300  Informed pt if does not void in 8 hours to go to ER. Notify if redness, drainage, from injection site or fever over next 3-4 days. Rest and drink plenty of fluids for the remainder of the day. No lifting over 5 lbs. For the remainder of the day. Continue regular medications as prescribed. May take pain medications as prescribed.     Pain improved 80%

## 2022-12-02 NOTE — TRANSFER OF CARE
"Anesthesia Transfer of Care Note    Patient: Amador Callahan    Procedure(s) Performed: Procedure(s) (LRB):  Injection-steroid-epidural-cervical, C4/5 EDE (N/A)    Patient location: Other:    Anesthesia Type: general    Transport from OR: Transported from OR on room air with adequate spontaneous ventilation    Post pain: adequate analgesia    Post assessment: no apparent anesthetic complications    Post vital signs: stable    Level of consciousness: awake and alert    Nausea/Vomiting: no nausea/vomiting    Complications: none    Transfer of care protocol was followed      Last vitals:   Visit Vitals  /65   Pulse 91   Temp 36.6 °C (97.9 °F)   Resp 14   Ht 5' 6" (1.676 m)   Wt 70.3 kg (155 lb)   SpO2 97%   BMI 25.02 kg/m²     "

## 2022-12-02 NOTE — ANESTHESIA PREPROCEDURE EVALUATION
12/02/2022  Amador Callahan is a 61 y.o., male.      Pre-op Assessment    I have reviewed the Patient Summary Reports.     I have reviewed the Nursing Notes. I have reviewed the NPO Status.   I have reviewed the Medications.     Review of Systems  Anesthesia Hx:  No problems with previous Anesthesia  History of prior surgery of interest to airway management or planning: Denies Family Hx of Anesthesia complications.   Denies Personal Hx of Anesthesia complications.   Social:  Former Smoker    Hematology/Oncology:  Hematology Normal   Oncology Normal     EENT/Dental:EENT/Dental Normal   Cardiovascular:   Hypertension Denies MI.   Angina, with exertion    Pulmonary:   Denies Shortness of breath.    Renal/:  Renal/ Normal     Hepatic/GI:   GERD    Musculoskeletal:   Arthritis     Neurological:   CVA, residual symptoms 2007   Endocrine:  Endocrine Normal    Psych:   depression             Anesthesia Plan  Type of Anesthesia, risks & benefits discussed:    Anesthesia Type: Gen Natural Airway  Intra-op Monitoring Plan: Standard ASA Monitors  Post Op Pain Control Plan: multimodal analgesia  Induction:  IV  Informed Consent: Patient consented to blood products? Yes  ASA Score: 3  Day of Surgery Review of History & Physical: I have interviewed and examined the patient. I have reviewed the patient's H&P dated: There are no significant changes.     Ready For Surgery From Anesthesia Perspective.     .    Past Medical History:   Diagnosis Date    Adenomatous polyp of descending colon 5/17/2021    Anxiety     Diverticula, colon 5/17/2021    GERD (gastroesophageal reflux disease)     History of cerebrovascular accident     Hyperlipidemia     Hypertension     Screening for malignant neoplasm of colon 5/17/2021       Past Surgical History:   Procedure Laterality Date    heart cath         Family History   Problem  Relation Age of Onset    Heart disease Mother     Cancer Father     Cancer Sister        Social History     Socioeconomic History    Marital status: Single   Tobacco Use    Smoking status: Former     Packs/day: 1.00     Years: 2.00     Pack years: 2.00     Types: Cigarettes     Start date:      Quit date:      Years since quittin.9    Smokeless tobacco: Never   Substance and Sexual Activity    Alcohol use: Never    Drug use: Never    Sexual activity: Not Currently       Current Facility-Administered Medications   Medication Dose Route Frequency Provider Last Rate Last Admin    0.9%  NaCl infusion   Intravenous Continuous Angelic Hdz MD           Review of patient's allergies indicates:  No Known Allergies     Facility-Administered Medications as of 2022   Medication Dose Route Frequency Provider Last Rate Last Admin    0.9%  NaCl infusion   Intravenous Continuous Angelic Hdz MD         Outpatient Medications as of 2022   Medication Sig Dispense Refill    atorvastatin (LIPITOR) 40 MG tablet Take 1 tablet (40 mg total) by mouth once daily. 90 tablet 1    busPIRone (BUSPAR) 10 MG tablet Take 1 tablet (10 mg total) by mouth 2 (two) times a day. 180 tablet 1    lisinopriL 10 MG tablet Take 1 tablet (10 mg total) by mouth once daily. 90 tablet 1    omeprazole (PRILOSEC) 20 MG capsule Take 1 capsule (20 mg total) by mouth once daily. 90 capsule 1    cefPROZIL (CEFZIL) 500 MG tablet       cyclobenzaprine (FLEXERIL) 5 MG tablet Take 1 tablet (5 mg total) by mouth every 8 (eight) hours as needed for Muscle spasms. (Patient not taking: No sig reported) 30 tablet 0

## 2022-12-02 NOTE — DISCHARGE SUMMARY
[facility] - Pain Management  Discharge Note  Short Stay    Procedure(s) (LRB):  Injection-steroid-epidural-cervical, C4/5 EDE (N/A)      OUTCOME: Patient tolerated treatment/procedure well without complication and is now ready for discharge.    DISPOSITION: Home or Self Care    FINAL DIAGNOSIS:   cervical radiculopathy    FOLLOWUP: In clinic    DISCHARGE INSTRUCTIONS:   see nurse's notes     TIME SPENT ON DISCHARGE: 5 minutes

## 2022-12-02 NOTE — BRIEF OP NOTE
Discharge Note  Short Stay    Admit Date: 12/2/2022    Discharge Date: 12/2/2022    Attending Physician: Angelic Hdz     Discharge Provider: Angelic Hdz    Diagnoses: Cervical radiculopathy    Discharged Condition: Good    Final Diagnoses: Cervical radiculopathy [M54.12]    Disposition: Home or Self Care    Hospital Course: No complications, uneventful    Outcome of Hospitalization, Treatment, Procedure, or Surgery:  Patient was admitted for outpatient interventional pain management procedure. The patient tolerated the procedure well with no complications.    Follow up/Patient Instructions:  Follow up as scheduled in Pain Management office in 3-4 weeks.  Patient has received instructions and follow up date and time.    Medications:  Continue previous medications

## 2022-12-02 NOTE — OP NOTE
"Procedure Note    Procedure Date: 12/2/2022    Procedure Performed:  Cervical interlaminar epidural steroid injection under fluoroscopy at C4-5    Indications: Patient has failed conservative therapy.      Pre-op diagnosis: Cervical Radiculopathy    Post-op diagnosis: same    Physician: Angelic Hdz MD    Medications injected: Kenalog 40mg, 2 mL sterile preservative-free normal saline.    Local anesthetic used: 1% Lidocaine    Anesthesia: MAC    IVF: Per Anesthesia    Estimated Blood Loss: Less than 1cc    Complications: None    Technique:  The patient was interviewed in the holding area and Risks/Benefits were discussed, including, but not limited to, the possibility of new or different pain, bleeding or infection.   All questions were answered.  The patient understood and accepted risks.  Consent was verified and signed.   A time-out was taken to identify patient and procedure prior to starting the procedure.  With the patient laying in a prone position with the neck in a mid-flexed forward position, the area was prepped and draped in the usual sterile fashion using ChloraPrep and a fenestrated drape.  The area was visualized  under AP fluoroscopic guidance.  The skin and subcutaneous tissues overlying the targeted interspace were anesthetized with 2 mL of 1% Lidocaine using a 25G 1.5" needle.  A 20G, 3.5" Tuohy epidural needle was inserted through the anesthetized skin at C5-6 and directed toward the interspace under fluoroscopic.  The needle was advanced under AP and lateral views.  The epidural needle was advanced to the  ligamentum flavum.  A loss-of-resistance technique using a pulsator syringe was used to identify entrance of the needle into the epidural space at C4-5. Once the tip of the needle entered  the posterior epidural space, 2cc of isovue dye  was injected to determine placement.  There was no intravascular, intrathecal a subarachnoid spread. After negative aspiration, 2 cc of 0.9% normal saline and " 40 mg of Kenalog were injected.  The needle was  removed and a sterile Band-Aid dressing was applied to the puncture site.  The patient tolerated the procedure well and was transferred to the [facility] in stable condition.  The patient was monitored after the procedure and given post-procedure and discharge instructions to follow at home. The patient was discharged in a stable condition and accompanied  by an adult .    Epidurogram:  The patient received 3 cc  of Isovue-M 300 contrast with excellent delineation within the epidural space from C3-5.  There were no filling defects or obstruction to dye flow noted.

## 2022-12-02 NOTE — ANESTHESIA POSTPROCEDURE EVALUATION
Anesthesia Post Evaluation    Patient: Amador Callahan    Procedure(s) Performed: Procedure(s) (LRB):  Injection-steroid-epidural-cervical, C4/5 EDE (N/A)    Final Anesthesia Type: general      Patient location during evaluation: OPS  Patient participation: Yes- Able to Participate  Level of consciousness: awake and alert  Post-procedure vital signs: reviewed and stable  Pain management: adequate  Airway patency: patent  MARCIO mitigation strategies: Multimodal analgesia  PONV status at discharge: No PONV  Anesthetic complications: no      Cardiovascular status: blood pressure returned to baseline  Respiratory status: spontaneous ventilation  Hydration status: euvolemic  Follow-up not needed.          Vitals Value Taken Time   /86 12/02/22 1255   Temp 36.6 °C (97.9 °F) 12/02/22 1224   Pulse 82 12/02/22 1259   Resp 11 12/02/22 1259   SpO2 99 % 12/02/22 1259   Vitals shown include unvalidated device data.      Event Time   Out of Recovery 12:55:00         Pain/Ze Score: Ze Score: 10 (12/2/2022 12:55 PM)

## 2022-12-08 ENCOUNTER — OFFICE VISIT (OUTPATIENT)
Dept: FAMILY MEDICINE | Facility: CLINIC | Age: 62
End: 2022-12-08
Payer: COMMERCIAL

## 2022-12-08 VITALS
OXYGEN SATURATION: 97 % | RESPIRATION RATE: 20 BRPM | WEIGHT: 153 LBS | DIASTOLIC BLOOD PRESSURE: 66 MMHG | HEART RATE: 83 BPM | BODY MASS INDEX: 24.59 KG/M2 | HEIGHT: 66 IN | SYSTOLIC BLOOD PRESSURE: 138 MMHG

## 2022-12-08 DIAGNOSIS — I10 HYPERTENSION, UNSPECIFIED TYPE: Primary | ICD-10-CM

## 2022-12-08 DIAGNOSIS — E78.5 HYPERLIPIDEMIA, UNSPECIFIED HYPERLIPIDEMIA TYPE: ICD-10-CM

## 2022-12-08 DIAGNOSIS — K21.9 GASTROESOPHAGEAL REFLUX DISEASE, UNSPECIFIED WHETHER ESOPHAGITIS PRESENT: ICD-10-CM

## 2022-12-08 DIAGNOSIS — F41.9 ANXIETY: ICD-10-CM

## 2022-12-08 LAB
ALBUMIN SERPL BCP-MCNC: 3.6 G/DL (ref 3.5–5)
ALBUMIN/GLOB SERPL: 0.9 {RATIO}
ALP SERPL-CCNC: 116 U/L (ref 45–115)
ALT SERPL W P-5'-P-CCNC: 46 U/L (ref 16–61)
ANION GAP SERPL CALCULATED.3IONS-SCNC: 9 MMOL/L (ref 7–16)
AST SERPL W P-5'-P-CCNC: 13 U/L (ref 15–37)
BILIRUB SERPL-MCNC: 0.3 MG/DL (ref ?–1.2)
BUN SERPL-MCNC: 16 MG/DL (ref 7–18)
BUN/CREAT SERPL: 15 (ref 6–20)
CALCIUM SERPL-MCNC: 9.3 MG/DL (ref 8.5–10.1)
CHLORIDE SERPL-SCNC: 107 MMOL/L (ref 98–107)
CHOLEST SERPL-MCNC: 148 MG/DL (ref 0–200)
CHOLEST/HDLC SERPL: 3.6 {RATIO}
CO2 SERPL-SCNC: 26 MMOL/L (ref 21–32)
CREAT SERPL-MCNC: 1.04 MG/DL (ref 0.7–1.3)
EGFR (NO RACE VARIABLE) (RUSH/TITUS): 81 ML/MIN/1.73M²
GLOBULIN SER-MCNC: 4.2 G/DL (ref 2–4)
GLUCOSE SERPL-MCNC: 121 MG/DL (ref 74–106)
HDLC SERPL-MCNC: 41 MG/DL (ref 40–60)
LDLC SERPL CALC-MCNC: 61 MG/DL
LDLC/HDLC SERPL: 1.5 {RATIO}
NONHDLC SERPL-MCNC: 107 MG/DL
POTASSIUM SERPL-SCNC: 4.2 MMOL/L (ref 3.5–5.1)
PROT SERPL-MCNC: 7.8 G/DL (ref 6.4–8.2)
SODIUM SERPL-SCNC: 138 MMOL/L (ref 136–145)
TRIGL SERPL-MCNC: 232 MG/DL (ref 35–150)
VLDLC SERPL-MCNC: 46 MG/DL

## 2022-12-08 PROCEDURE — 4010F PR ACE/ARB THEARPY RXD/TAKEN: ICD-10-PCS | Mod: ,,, | Performed by: NURSE PRACTITIONER

## 2022-12-08 PROCEDURE — 99214 PR OFFICE/OUTPT VISIT, EST, LEVL IV, 30-39 MIN: ICD-10-PCS | Mod: ,,, | Performed by: NURSE PRACTITIONER

## 2022-12-08 PROCEDURE — 80053 COMPREHEN METABOLIC PANEL: CPT | Mod: ,,, | Performed by: CLINICAL MEDICAL LABORATORY

## 2022-12-08 PROCEDURE — 3008F BODY MASS INDEX DOCD: CPT | Mod: ,,, | Performed by: NURSE PRACTITIONER

## 2022-12-08 PROCEDURE — 3008F PR BODY MASS INDEX (BMI) DOCUMENTED: ICD-10-PCS | Mod: ,,, | Performed by: NURSE PRACTITIONER

## 2022-12-08 PROCEDURE — 3075F PR MOST RECENT SYSTOLIC BLOOD PRESS GE 130-139MM HG: ICD-10-PCS | Mod: ,,, | Performed by: NURSE PRACTITIONER

## 2022-12-08 PROCEDURE — 3078F DIAST BP <80 MM HG: CPT | Mod: ,,, | Performed by: NURSE PRACTITIONER

## 2022-12-08 PROCEDURE — 1159F PR MEDICATION LIST DOCUMENTED IN MEDICAL RECORD: ICD-10-PCS | Mod: ,,, | Performed by: NURSE PRACTITIONER

## 2022-12-08 PROCEDURE — 1160F RVW MEDS BY RX/DR IN RCRD: CPT | Mod: ,,, | Performed by: NURSE PRACTITIONER

## 2022-12-08 PROCEDURE — 99214 OFFICE O/P EST MOD 30 MIN: CPT | Mod: ,,, | Performed by: NURSE PRACTITIONER

## 2022-12-08 PROCEDURE — 80053 COMPREHENSIVE METABOLIC PANEL: ICD-10-PCS | Mod: ,,, | Performed by: CLINICAL MEDICAL LABORATORY

## 2022-12-08 PROCEDURE — 1159F MED LIST DOCD IN RCRD: CPT | Mod: ,,, | Performed by: NURSE PRACTITIONER

## 2022-12-08 PROCEDURE — 3066F NEPHROPATHY DOC TX: CPT | Mod: ,,, | Performed by: NURSE PRACTITIONER

## 2022-12-08 PROCEDURE — 1160F PR REVIEW ALL MEDS BY PRESCRIBER/CLIN PHARMACIST DOCUMENTED: ICD-10-PCS | Mod: ,,, | Performed by: NURSE PRACTITIONER

## 2022-12-08 PROCEDURE — 3075F SYST BP GE 130 - 139MM HG: CPT | Mod: ,,, | Performed by: NURSE PRACTITIONER

## 2022-12-08 PROCEDURE — 80061 LIPID PANEL: CPT | Mod: ,,, | Performed by: CLINICAL MEDICAL LABORATORY

## 2022-12-08 PROCEDURE — 3061F PR NEG MICROALBUMINURIA RESULT DOCUMENTED/REVIEW: ICD-10-PCS | Mod: ,,, | Performed by: NURSE PRACTITIONER

## 2022-12-08 PROCEDURE — 4010F ACE/ARB THERAPY RXD/TAKEN: CPT | Mod: ,,, | Performed by: NURSE PRACTITIONER

## 2022-12-08 PROCEDURE — 3078F PR MOST RECENT DIASTOLIC BLOOD PRESSURE < 80 MM HG: ICD-10-PCS | Mod: ,,, | Performed by: NURSE PRACTITIONER

## 2022-12-08 PROCEDURE — 3061F NEG MICROALBUMINURIA REV: CPT | Mod: ,,, | Performed by: NURSE PRACTITIONER

## 2022-12-08 PROCEDURE — 3066F PR DOCUMENTATION OF TREATMENT FOR NEPHROPATHY: ICD-10-PCS | Mod: ,,, | Performed by: NURSE PRACTITIONER

## 2022-12-08 PROCEDURE — 80061 LIPID PANEL: ICD-10-PCS | Mod: ,,, | Performed by: CLINICAL MEDICAL LABORATORY

## 2022-12-08 RX ORDER — BUSPIRONE HYDROCHLORIDE 10 MG/1
10 TABLET ORAL 2 TIMES DAILY
Qty: 180 TABLET | Refills: 1 | Status: SHIPPED | OUTPATIENT
Start: 2022-12-08 | End: 2023-06-01 | Stop reason: SDUPTHER

## 2022-12-08 RX ORDER — OMEPRAZOLE 20 MG/1
20 CAPSULE, DELAYED RELEASE ORAL DAILY
Qty: 90 CAPSULE | Refills: 1 | Status: SHIPPED | OUTPATIENT
Start: 2022-12-08 | End: 2023-06-01 | Stop reason: SDUPTHER

## 2022-12-08 RX ORDER — ATORVASTATIN CALCIUM 40 MG/1
40 TABLET, FILM COATED ORAL DAILY
Qty: 90 TABLET | Refills: 1 | Status: SHIPPED | OUTPATIENT
Start: 2022-12-08 | End: 2023-06-01 | Stop reason: SDUPTHER

## 2022-12-08 RX ORDER — LISINOPRIL 10 MG/1
10 TABLET ORAL DAILY
Qty: 90 TABLET | Refills: 1 | Status: SHIPPED | OUTPATIENT
Start: 2022-12-08 | End: 2023-06-01 | Stop reason: SDUPTHER

## 2022-12-08 NOTE — PROGRESS NOTES
Kaya Go DNP, FNP    [address] [place] [address]     PATIENT NAME: Amador Callahan  : 1960  DATE: 22  MRN: [id number]      Billing Provider: Kaya Go DNP, FNP  Level of Service:   Patient PCP Information       Provider PCP Type    Kaya Go DNP, FNP General            Reason for Visit / Chief Complaint: Follow-up (Patient is here for a follow up and medication refills. )       Update PCP  Update Chief Complaint         History of Present Illness / Problem Focused Workflow     Amador Callahan presents to the clinic with Follow-up (Patient is here for a follow up and medication refills. )    Review of Systems     Review of Systems   Constitutional:  Negative for activity change and appetite change.   HENT:  Negative for dental problem, ear pain, sinus pressure/congestion and sore throat.    Respiratory:  Negative for cough, chest tightness and shortness of breath.    Cardiovascular:  Negative for chest pain and palpitations.   Gastrointestinal:  Negative for abdominal pain.   Genitourinary:  Negative for bladder incontinence and dysuria.   Musculoskeletal:  Negative for arthralgias.   Integumentary:  Negative for rash.   Neurological:  Positive for facial asymmetry. Negative for dizziness, weakness and numbness.      Medical / Social / Family History     Past Medical History:   Diagnosis Date    Adenomatous polyp of descending colon 2021    Anxiety     Diverticula, colon 2021    GERD (gastroesophageal reflux disease)     History of cerebrovascular accident     Hyperlipidemia     Hypertension     Screening for malignant neoplasm of colon 2021       Past Surgical History:   Procedure Laterality Date    EPIDURAL STEROID INJECTION INTO CERVICAL SPINE N/A 2022    Procedure: Injection-steroid-epidural-cervical, C4/5 EDE;  Surgeon: Angelic Hdz MD;  Location: [facility];  Service: Pain Management;  Laterality: N/A;  "   heart cath         Social History  . Amador Callahan  reports that he quit smoking about 42 years ago. His smoking use included cigarettes. He started smoking about 44 years ago. He has a 2.00 pack-year smoking history. He has been exposed to tobacco smoke. He has never used smokeless tobacco. He reports that he does not drink alcohol and does not use drugs.    Family History  Mr. Amador Callahan's family history includes Cancer in his father and sister; Heart disease in his mother.    Medications and Allergies     Medications  Outpatient Medications Marked as Taking for the 12/8/22 encounter (Office Visit) with Kaya Go, VERONIKA, FNP   Medication Sig Dispense Refill    cefPROZIL (CEFZIL) 500 MG tablet       gabapentin (NEURONTIN) 100 MG capsule Take 1 capsule (100 mg total) by mouth every 8 (eight) hours. 90 capsule 0    HYDROcodone-acetaminophen (NORCO) 5-325 mg per tablet Take 1 tablet by mouth every 12 (twelve) hours as needed for Pain. 30 tablet 0    [DISCONTINUED] atorvastatin (LIPITOR) 40 MG tablet Take 1 tablet (40 mg total) by mouth once daily. 90 tablet 1    [DISCONTINUED] busPIRone (BUSPAR) 10 MG tablet Take 1 tablet (10 mg total) by mouth 2 (two) times a day. 180 tablet 1    [DISCONTINUED] lisinopriL 10 MG tablet Take 1 tablet (10 mg total) by mouth once daily. 90 tablet 1    [DISCONTINUED] omeprazole (PRILOSEC) 20 MG capsule Take 1 capsule (20 mg total) by mouth once daily. 90 capsule 1       Allergies  Review of patient's allergies indicates:  No Known Allergies    Physical Examination     Vitals:    12/08/22 1515   BP: 138/66   BP Location: Right arm   Patient Position: Sitting   BP Method: Large (Automatic)   Pulse: 83   Resp: 20   SpO2: 97%   Weight: 69.4 kg (153 lb)   Height: 5' 6" (1.676 m)     Physical Exam  Vitals and nursing note reviewed.   Constitutional:       General: He is not in acute distress.     Appearance: He is normal weight.   HENT:      Mouth/Throat:      Mouth: Mucous " membranes are moist.      Comments: Mouth drawn--chronic  Eyes:      Pupils: Pupils are equal, round, and reactive to light.   Cardiovascular:      Rate and Rhythm: Normal rate and regular rhythm.      Pulses: Normal pulses.      Heart sounds: No murmur heard.  Pulmonary:      Effort: Pulmonary effort is normal. No respiratory distress.      Breath sounds: Normal breath sounds. No wheezing, rhonchi or rales.   Chest:      Chest wall: No tenderness.   Abdominal:      General: Bowel sounds are normal. There is no distension.      Palpations: Abdomen is soft.      Tenderness: There is no abdominal tenderness. There is no right CVA tenderness, left CVA tenderness, guarding or rebound.   Musculoskeletal:         General: No swelling or tenderness. Normal range of motion.      Cervical back: Normal range of motion and neck supple.      Right lower leg: No edema.      Left lower leg: No edema.   Skin:     General: Skin is warm and dry.   Neurological:      General: No focal deficit present.      Mental Status: He is alert and oriented to person, place, and time.   Psychiatric:         Mood and Affect: Mood normal.         Behavior: Behavior normal.         Thought Content: Thought content normal.         Judgment: Judgment normal.        Assessment and Plan (including Health Maintenance)      Problem List  Smart Sets  Document Outside HM   :    Plan:         Health Maintenance Due   Topic Date Due    Influenza Vaccine (1) Never done       Problem List Items Addressed This Visit          Psychiatric    Anxiety (Chronic)    Relevant Medications    busPIRone (BUSPAR) 10 MG tablet       Cardiac/Vascular    Hyperlipidemia (Chronic)    Relevant Medications    atorvastatin (LIPITOR) 40 MG tablet    Other Relevant Orders    Comprehensive Metabolic Panel (Completed)    Lipid Panel (Completed)    Hypertension - Primary (Chronic)    Relevant Medications    lisinopriL 10 MG tablet    Other Relevant Orders    Comprehensive Metabolic  Panel (Completed)    CBC Auto Differential (Completed)    Lipid Panel (Completed)       GI    GERD (gastroesophageal reflux disease) (Chronic)    Relevant Medications    omeprazole (PRILOSEC) 20 MG capsule     Hypertension, unspecified type  -     lisinopriL 10 MG tablet; Take 1 tablet (10 mg total) by mouth once daily.  Dispense: 90 tablet; Refill: 1  -     Comprehensive Metabolic Panel; Future; Expected date: 12/08/2022  -     CBC Auto Differential; Future; Expected date: 12/08/2022  -     Lipid Panel; Future; Expected date: 12/08/2022    Hyperlipidemia, unspecified hyperlipidemia type  -     atorvastatin (LIPITOR) 40 MG tablet; Take 1 tablet (40 mg total) by mouth once daily.  Dispense: 90 tablet; Refill: 1  -     Comprehensive Metabolic Panel; Future; Expected date: 12/08/2022  -     Lipid Panel; Future; Expected date: 12/08/2022    Anxiety  -     busPIRone (BUSPAR) 10 MG tablet; Take 1 tablet (10 mg total) by mouth 2 (two) times a day.  Dispense: 180 tablet; Refill: 1    Gastroesophageal reflux disease, unspecified whether esophagitis present  -     omeprazole (PRILOSEC) 20 MG capsule; Take 1 capsule (20 mg total) by mouth once daily.  Dispense: 90 capsule; Refill: 1     Health Maintenance Topics with due status: Not Due       Topic Last Completion Date    Colorectal Cancer Screening 05/17/2021    PROSTATE-SPECIFIC ANTIGEN 09/07/2022    High Dose Statin 12/08/2022    Lipid Panel 12/08/2022           Future Appointments   Date Time Provider Department Center   12[facility]16/2022  [facility]   4/12/2023  2:00 PM AWV NURSE, [facility] [place]   6/1/2023  2:00 PM Kaya Go DNP, FNP [facility] [place]        Follow up in about 6 months (around 6/8/2023).     Signature:  Kaya Go DNP, FNP  [address] [place] [address]  Phone #: [phone]  Fax #: [phone]    Date of encounter:  12/8/22    Patient Instructions   Continue current medication regimen. Will call pt with lab results.  Follow up in 6 months for chronic medical problems. Follow up as needed.

## 2022-12-08 NOTE — PATIENT INSTRUCTIONS
Continue current medication regimen. Will call pt with lab results.  Follow up in 6 months for chronic medical problems. Follow up as needed.

## 2022-12-09 ENCOUNTER — TELEPHONE (OUTPATIENT)
Dept: FAMILY MEDICINE | Facility: CLINIC | Age: 62
End: 2022-12-09
Payer: COMMERCIAL

## 2022-12-09 LAB
BASOPHILS # BLD AUTO: 0.03 K/UL (ref 0–0.2)
BASOPHILS NFR BLD AUTO: 0.3 % (ref 0–1)
DIFFERENTIAL METHOD BLD: ABNORMAL
EOSINOPHIL # BLD AUTO: 0.07 K/UL (ref 0–0.5)
EOSINOPHIL NFR BLD AUTO: 0.6 % (ref 1–4)
ERYTHROCYTE [DISTWIDTH] IN BLOOD BY AUTOMATED COUNT: 13 % (ref 11.5–14.5)
HCT VFR BLD AUTO: 47 % (ref 40–54)
HGB BLD-MCNC: 14.4 G/DL (ref 13.5–18)
IMM GRANULOCYTES # BLD AUTO: 0.06 K/UL (ref 0–0.04)
IMM GRANULOCYTES NFR BLD: 0.5 % (ref 0–0.4)
LYMPHOCYTES # BLD AUTO: 1.73 K/UL (ref 1–4.8)
LYMPHOCYTES NFR BLD AUTO: 15 % (ref 27–41)
MCH RBC QN AUTO: 26.8 PG (ref 27–31)
MCHC RBC AUTO-ENTMCNC: 30.6 G/DL (ref 32–36)
MCV RBC AUTO: 87.5 FL (ref 80–96)
MONOCYTES # BLD AUTO: 0.82 K/UL (ref 0–0.8)
MONOCYTES NFR BLD AUTO: 7.1 % (ref 2–6)
MPC BLD CALC-MCNC: 10.8 FL (ref 9.4–12.4)
NEUTROPHILS # BLD AUTO: 8.79 K/UL (ref 1.8–7.7)
NEUTROPHILS NFR BLD AUTO: 76.5 % (ref 53–65)
NRBC # BLD AUTO: 0 X10E3/UL
NRBC, AUTO (.00): 0 %
PLATELET # BLD AUTO: 285 K/UL (ref 150–400)
RBC # BLD AUTO: 5.37 M/UL (ref 4.6–6.2)
WBC # BLD AUTO: 11.5 K/UL (ref 4.5–11)

## 2022-12-09 NOTE — TELEPHONE ENCOUNTER
Called patient to notify him that his lab results were fine, and he needed to repeat them in 6 months. Patient notified of results and instructions. Verbalized understanding.

## 2022-12-15 NOTE — PROGRESS NOTES
Subjective:         Patient ID: Amador Callahan is a 62 y.o. male.    Chief Complaint: Neck Pain        Pain  This is a chronic problem. The current episode started more than 1 year ago. The problem occurs daily. The problem has been gradually improving. Associated symptoms include arthralgias, chest pain and neck pain. Pertinent negatives include no anorexia, chills, coughing, diaphoresis, fever, sore throat, swollen glands, vertigo or vomiting.   Review of Systems   Constitutional:  Negative for activity change, appetite change, chills, diaphoresis, fever and unexpected weight change.   HENT:  Negative for drooling, ear discharge, ear pain, facial swelling, nosebleeds, sore throat, trouble swallowing, voice change and goiter.    Eyes:  Negative for photophobia, pain, discharge, redness and visual disturbance.   Respiratory:  Negative for apnea, cough, choking, chest tightness, shortness of breath, wheezing and stridor.    Cardiovascular:  Positive for chest pain. Negative for palpitations and leg swelling.   Gastrointestinal:  Negative for abdominal distention, anorexia, diarrhea, rectal pain, vomiting and fecal incontinence.   Endocrine: Negative for cold intolerance, heat intolerance, polydipsia, polyphagia and polyuria.   Genitourinary:  Negative for bladder incontinence, dysuria, flank pain and frequency.   Musculoskeletal:  Positive for arthralgias, back pain, leg pain and neck pain.   Integumentary:  Negative for color change and pallor.   Neurological:  Negative for dizziness, vertigo, seizures, syncope, facial asymmetry, speech difficulty, light-headedness, coordination difficulties, memory loss and coordination difficulties.   Hematological:  Negative for adenopathy. Does not bruise/bleed easily.   Psychiatric/Behavioral:  Negative for agitation, behavioral problems, confusion, decreased concentration, dysphoric mood, hallucinations, self-injury and suicidal ideas. The patient is not nervous/anxious  "and is not hyperactive.          Past Medical History:   Diagnosis Date    Adenomatous polyp of descending colon 2021    Anxiety     Diverticula, colon 2021    GERD (gastroesophageal reflux disease)     History of cerebrovascular accident     Hyperlipidemia     Hypertension     Screening for malignant neoplasm of colon 2021     Past Surgical History:   Procedure Laterality Date    EPIDURAL STEROID INJECTION INTO CERVICAL SPINE N/A 2022    Procedure: Injection-steroid-epidural-cervical, C4/5 EDE;  Surgeon: Angelic Hdz MD;  Location: [facility];  Service: Pain Management;  Laterality: N/A;    heart cath       Social History     Socioeconomic History    Marital status: Single   Tobacco Use    Smoking status: Former     Packs/day: 1.00     Years: 2.00     Pack years: 2.00     Types: Cigarettes     Start date:      Quit date:      Years since quittin.9     Passive exposure: Past    Smokeless tobacco: Never   Substance and Sexual Activity    Alcohol use: Never    Drug use: Never    Sexual activity: Not Currently     Family History   Problem Relation Age of Onset    Heart disease Mother     Cancer Father     Cancer Sister      Review of patient's allergies indicates:  No Known Allergies     Objective:  Vitals:    22 0809 22 0810   BP: 122/80    Pulse: 93    Weight: 70.3 kg (155 lb)    Height: 5' 6" (1.676 m)    PainSc:   6   6         Physical Exam  Vitals and nursing note reviewed. Exam conducted with a chaperone present.   Constitutional:       General: He is awake. He is not in acute distress.     Appearance: Normal appearance. He is not ill-appearing, toxic-appearing or diaphoretic.   HENT:      Head: Normocephalic and atraumatic.      Nose: Nose normal.      Mouth/Throat:      Mouth: Mucous membranes are moist.      Pharynx: Oropharynx is clear.   Eyes:      Conjunctiva/sclera: Conjunctivae normal.      Pupils: Pupils are equal, round, and reactive to light. "   Cardiovascular:      Rate and Rhythm: Normal rate.   Pulmonary:      Effort: Pulmonary effort is normal. No respiratory distress.   Abdominal:      Palpations: Abdomen is soft.      Tenderness: There is no guarding.   Musculoskeletal:         General: Normal range of motion.      Cervical back: Normal range of motion and neck supple. No rigidity.   Skin:     General: Skin is warm and dry.      Coloration: Skin is not jaundiced or pale.   Neurological:      General: No focal deficit present.      Mental Status: He is alert and oriented to person, place, and time. Mental status is at baseline.      Cranial Nerves: No cranial nerve deficit (II-XII).   Psychiatric:         Mood and Affect: Mood normal.         Behavior: Behavior normal. Behavior is cooperative.         Thought Content: Thought content normal.         FL Fluoro for Pain Management  See OP Notes for results.     IMPRESSION: See OP Notes for results.     This procedure was auto-finalized by: Virtual Radiologist         Office Visit on 12/08/2022   Component Date Value Ref Range Status    Sodium 12/08/2022 138  136 - 145 mmol/L Final    Potassium 12/08/2022 4.2  3.5 - 5.1 mmol/L Final    Chloride 12/08/2022 107  98 - 107 mmol/L Final    CO2 12/08/2022 26  21 - 32 mmol/L Final    Anion Gap 12/08/2022 9  7 - 16 mmol/L Final    Glucose 12/08/2022 121 (H)  74 - 106 mg/dL Final    BUN 12/08/2022 16  7 - 18 mg/dL Final    Creatinine 12/08/2022 1.04  0.70 - 1.30 mg/dL Final    BUN/Creatinine Ratio 12/08/2022 15  6 - 20 Final    Calcium 12/08/2022 9.3  8.5 - 10.1 mg/dL Final    Total Protein 12/08/2022 7.8  6.4 - 8.2 g/dL Final    Albumin 12/08/2022 3.6  3.5 - 5.0 g/dL Final    Globulin 12/08/2022 4.2 (H)  2.0 - 4.0 g/dL Final    A/G Ratio 12/08/2022 0.9   Final    Bilirubin, Total 12/08/2022 0.3  >0.0 - 1.2 mg/dL Final    Alk Phos 12/08/2022 116 (H)  45 - 115 U/L Final    ALT 12/08/2022 46  16 - 61 U/L Final    AST 12/08/2022 13 (L)  15 - 37 U/L Final    eGFR  12/08/2022 81  >=60 mL/min/1.73m² Final    Triglycerides 12/08/2022 232 (H)  35 - 150 mg/dL Final    Cholesterol 12/08/2022 148  0 - 200 mg/dL Final    HDL Cholesterol 12/08/2022 41  40 - 60 mg/dL Final    Cholesterol/HDL Ratio (Risk Factor) 12/08/2022 3.6   Final    Non-HDL 12/08/2022 107  mg/dL Final    LDL Calculated 12/08/2022 61  mg/dL Final    LDL/HDL 12/08/2022 1.5   Final    VLDL 12/08/2022 46  mg/dL Final    WBC 12/08/2022 11.50 (H)  4.50 - 11.00 K/uL Final    RBC 12/08/2022 5.37  4.60 - 6.20 M/uL Final    Hemoglobin 12/08/2022 14.4  13.5 - 18.0 g/dL Final    Hematocrit 12/08/2022 47.0  40.0 - 54.0 % Final    MCV 12/08/2022 87.5  80.0 - 96.0 fL Final    MCH 12/08/2022 26.8 (L)  27.0 - 31.0 pg Final    MCHC 12/08/2022 30.6 (L)  32.0 - 36.0 g/dL Final    RDW 12/08/2022 13.0  11.5 - 14.5 % Final    Platelet Count 12/08/2022 285  150 - 400 K/uL Final    MPV 12/08/2022 10.8  9.4 - 12.4 fL Final    Neutrophils % 12/08/2022 76.5 (H)  53.0 - 65.0 % Final    Lymphocytes % 12/08/2022 15.0 (L)  27.0 - 41.0 % Final    Monocytes % 12/08/2022 7.1 (H)  2.0 - 6.0 % Final    Eosinophils % 12/08/2022 0.6 (L)  1.0 - 4.0 % Final    Basophils % 12/08/2022 0.3  0.0 - 1.0 % Final    Immature Granulocytes % 12/08/2022 0.5 (H)  0.0 - 0.4 % Final    nRBC, Auto 12/08/2022 0.0  <=0.0 % Final    Neutrophils, Abs 12/08/2022 8.79 (H)  1.80 - 7.70 K/uL Final    Lymphocytes, Absolute 12/08/2022 1.73  1.00 - 4.80 K/uL Final    Monocytes, Absolute 12/08/2022 0.82 (H)  0.00 - 0.80 K/uL Final    Eosinophils, Absolute 12/08/2022 0.07  0.00 - 0.50 K/uL Final    Basophils, Absolute 12/08/2022 0.03  0.00 - 0.20 K/uL Final    Immature Granulocytes, Absolute 12/08/2022 0.06 (H)  0.00 - 0.04 K/uL Final    nRBC, Absolute 12/08/2022 0.00  <=0.00 x10e3/uL Final    Diff Type 12/08/2022 Auto   Final   Office Visit on 11/09/2022   Component Date Value Ref Range Status    POC Amphetamines 11/09/2022 Negative  Negative, Inconclusive Final    POC Barbiturates  11/09/2022 Negative  Negative, Inconclusive Final    POC Benzodiazepines 11/09/2022 Negative  Negative, Inconclusive Final    POC Cocaine 11/09/2022 Negative  Negative, Inconclusive Final    POC THC 11/09/2022 Negative  Negative, Inconclusive Final    POC Methadone 11/09/2022 Negative  Negative, Inconclusive Final    POC Methamphetamine 11/09/2022 Negative  Negative, Inconclusive Final    POC Opiates 11/09/2022 Negative  Negative, Inconclusive Final    POC Oxycodone 11/09/2022 Negative  Negative, Inconclusive Final    POC Phencyclidine 11/09/2022 Negative  Negative, Inconclusive Final    POC Methylenedioxymethamphetamine * 11/09/2022 Negative  Negative, Inconclusive Final    POC Tricyclic Antidepressants 11/09/2022 Negative  Negative, Inconclusive Final    POC Buprenorphine 11/09/2022 Negative   Final     Acceptable 11/09/2022 Yes   Final    POC Temperature (Urine) 11/09/2022 94   Final   Office Visit on 09/07/2022   Component Date Value Ref Range Status    PSA Total 09/07/2022 0.284  0.000 - 4.100 ng/mL Final    Hepatitis C Ab 09/07/2022 Non-Reactive  Non-Reactive Final   Office Visit on 07/18/2022   Component Date Value Ref Range Status    POC Amphetamines 07/18/2022 Negative  Negative, Inconclusive Final    POC Barbiturates 07/18/2022 Negative  Negative, Inconclusive Final    POC Benzodiazepines 07/18/2022 Negative  Negative, Inconclusive Final    POC Cocaine 07/18/2022 Negative  Negative, Inconclusive Final    POC THC 07/18/2022 Negative  Negative, Inconclusive Final    POC Methadone 07/18/2022 Negative  Negative, Inconclusive Final    POC Methamphetamine 07/18/2022 Negative  Negative, Inconclusive Final    POC Opiates 07/18/2022 Negative  Negative, Inconclusive Final    POC Oxycodone 07/18/2022 Negative  Negative, Inconclusive Final    POC Phencyclidine 07/18/2022 Negative  Negative, Inconclusive Final    POC Methylenedioxymethamphetamine * 07/18/2022 Negative  Negative, Inconclusive Final    POC  Tricyclic Antidepressants 07/18/2022 Negative  Negative, Inconclusive Final    POC Buprenorphine 07/18/2022 Negative   Final     Acceptable 07/18/2022 Yes   Final    POC Temperature (Urine) 07/18/2022 92   Final    pH, UA 07/18/2022 6.0  5.0 to 8.0 pH Units Final    Creatinine, Urine 07/18/2022 83  39 - 259 mg/dL Final    6-Acetylmorphine 07/18/2022 Negative  10 ng/mL Final    7-Aminoclonazepam 07/18/2022 Negative  Negative 25 ng/mL Final    a-Hydroxyalprazolam 07/18/2022 Negative  Negative 25 ng/mL Final    Acetyl Fentanyl 07/18/2022 Negative  2.5 ng/mL Final    Acetyl Norfentanyl Oxalate 07/18/2022 Negative  5 ng/mL Final    Benzoylecgonine 07/18/2022 Negative  100 ng/mL Final    Buprenorphine 07/18/2022 Negative  25 ng/mL Final    Codeine 07/18/2022 Negative  25 ng/mL Final    EDDP 07/18/2022 Negative  25 ng/mL Final    Fentanyl 07/18/2022 Negative  2.5 ng/mL Final    Hydrocodone 07/18/2022 Negative  25 ng/mL Final    Hydromorphone 07/18/2022 Negative  25 ng/mL Final    Lorazepam 07/18/2022 Negative  25 ng/mL Final    Morphine 07/18/2022 Negative  25 ng/mL Final    Norbuprenorphine 07/18/2022 Negative  25 ng/mL Final    Nordiazepam 07/18/2022 Negative  25 ng/mL Final    Norfentanyl Oxalate 07/18/2022 Negative  5 ng/mL Final    Norhydrocodone 07/18/2022 Negative  50 ng/mL Final    Noroxycodone HCL 07/18/2022 Negative  50 ng/mL Final    Oxazepam 07/18/2022 Negative  25 ng/mL Final    Oxymorphone 07/18/2022 Negative  25 ng/mL Final    Tapentadol 07/18/2022 Negative  25 ng/mL Final    Temazepam 07/18/2022 Negative  25 ng/mL Final    THC-COOH 07/18/2022 Negative  25 ng/mL Final    Tramadol 07/18/2022 Negative  100 ng/mL Final    Amphetamine, Urine 07/18/2022 Negative  Negative Final    Methamphetamines, Urine 07/18/2022 Negative  Negative Final    Methadone, Urine 07/18/2022 Negative  Negative 25 ng/mL Final    Oxycodone, Urine 07/18/2022 Negative  Negative 25 ng/mL Final    Specific Gravity, UA  2022 1.010  <=1.030 Final         No orders of the defined types were placed in this encounter.          Requested Prescriptions     Signed Prescriptions Disp Refills    gabapentin (NEURONTIN) 100 MG capsule 90 capsule 2     Sig: Take 1 capsule (100 mg total) by mouth every 8 (eight) hours.    HYDROcodone-acetaminophen (NORCO) 5-325 mg per tablet 30 tablet 0     Sig: Take 1 tablet by mouth every 12 (twelve) hours as needed for Pain.    naloxone (NARCAN) 4 mg/actuation Spry 1 each 0     Si spray (4 mg total) by Nasal route once. for 1 dose    HYDROcodone-acetaminophen (NORCO) 5-325 mg per tablet 30 tablet 0     Sig: Take 1 tablet by mouth every 12 (twelve) hours as needed for Pain.    HYDROcodone-acetaminophen (NORCO) 5-325 mg per tablet 30 tablet 0     Sig: Take 1 tablet by mouth every 12 (twelve) hours as needed for Pain.       Assessment:     1. Cervical radiculopathy MRI May 2022    2. Chronic right shoulder pain    3. Osteoarthrosis multiple sites, not specified as generalized         A's of Opioid Risk Assessment  Activity:Patient can perform ADL.   Analgesia:Patients pain is partially controlled by current medication. Patient has tried OTC medications such as Tylenol and Ibuprofen with out relief.   Adverse Effects: Patient denies constipation or sedation.  Aberrant Behavior:  reviewed with no aberrant drug seeking/taking behavior.  Overdose reversal drug naloxone discussed    Drug screen reviewed      Plan:    Narcan 2022    Presumptive drug screen negative today, patient is 8 days passed his refill date due to scheduling issues in the office not his fault  Has not had medication for 8 days    Bell's palsy left face    Follows orthopedics [facility]    Follow-up after cervical C4/5 EDE # 1, 2022, he states he had 100% relief after procedure, the procedure did help improve his level of function    MRI cervical spine [facility] May 2022 which shows severe right  neuroforaminal narrowing central canal narrowing due to herniated discs, severe right neural foraminal narrowing C5/6 multiple level degenerative change    He would like to continue with conservative management for now     Continue home exercise program as directed    Gabapentin 100 mg 1 p.o. q.8 hours     Continue Norco as directed     Follow-up 3 months    Dr. Hdz December 2023    Bring original prescription medication bottles/container/box with labels to each visit    Pill count    Physical therapy    Massage therapy declines

## 2022-12-16 ENCOUNTER — OFFICE VISIT (OUTPATIENT)
Dept: PAIN MEDICINE | Facility: CLINIC | Age: 62
End: 2022-12-16
Payer: COMMERCIAL

## 2022-12-16 VITALS
HEIGHT: 66 IN | WEIGHT: 155 LBS | DIASTOLIC BLOOD PRESSURE: 80 MMHG | BODY MASS INDEX: 24.91 KG/M2 | HEART RATE: 93 BPM | SYSTOLIC BLOOD PRESSURE: 122 MMHG

## 2022-12-16 DIAGNOSIS — M89.49 OSTEOARTHROSIS MULTIPLE SITES, NOT SPECIFIED AS GENERALIZED: Chronic | ICD-10-CM

## 2022-12-16 DIAGNOSIS — M54.12 CERVICAL RADICULOPATHY: Primary | Chronic | ICD-10-CM

## 2022-12-16 DIAGNOSIS — G89.29 CHRONIC RIGHT SHOULDER PAIN: Chronic | ICD-10-CM

## 2022-12-16 DIAGNOSIS — M25.511 CHRONIC RIGHT SHOULDER PAIN: Chronic | ICD-10-CM

## 2022-12-16 PROCEDURE — 4010F ACE/ARB THERAPY RXD/TAKEN: CPT | Mod: CPTII,,, | Performed by: PHYSICIAN ASSISTANT

## 2022-12-16 PROCEDURE — 3008F PR BODY MASS INDEX (BMI) DOCUMENTED: ICD-10-PCS | Mod: CPTII,,, | Performed by: PHYSICIAN ASSISTANT

## 2022-12-16 PROCEDURE — 3079F DIAST BP 80-89 MM HG: CPT | Mod: CPTII,,, | Performed by: PHYSICIAN ASSISTANT

## 2022-12-16 PROCEDURE — 99214 OFFICE O/P EST MOD 30 MIN: CPT | Mod: S$PBB,,, | Performed by: PHYSICIAN ASSISTANT

## 2022-12-16 PROCEDURE — 3066F PR DOCUMENTATION OF TREATMENT FOR NEPHROPATHY: ICD-10-PCS | Mod: CPTII,,, | Performed by: PHYSICIAN ASSISTANT

## 2022-12-16 PROCEDURE — 3066F NEPHROPATHY DOC TX: CPT | Mod: CPTII,,, | Performed by: PHYSICIAN ASSISTANT

## 2022-12-16 PROCEDURE — 3061F NEG MICROALBUMINURIA REV: CPT | Mod: CPTII,,, | Performed by: PHYSICIAN ASSISTANT

## 2022-12-16 PROCEDURE — 4010F PR ACE/ARB THEARPY RXD/TAKEN: ICD-10-PCS | Mod: CPTII,,, | Performed by: PHYSICIAN ASSISTANT

## 2022-12-16 PROCEDURE — 3061F PR NEG MICROALBUMINURIA RESULT DOCUMENTED/REVIEW: ICD-10-PCS | Mod: CPTII,,, | Performed by: PHYSICIAN ASSISTANT

## 2022-12-16 PROCEDURE — 3008F BODY MASS INDEX DOCD: CPT | Mod: CPTII,,, | Performed by: PHYSICIAN ASSISTANT

## 2022-12-16 PROCEDURE — 3074F PR MOST RECENT SYSTOLIC BLOOD PRESSURE < 130 MM HG: ICD-10-PCS | Mod: CPTII,,, | Performed by: PHYSICIAN ASSISTANT

## 2022-12-16 PROCEDURE — 99214 PR OFFICE/OUTPT VISIT, EST, LEVL IV, 30-39 MIN: ICD-10-PCS | Mod: S$PBB,,, | Performed by: PHYSICIAN ASSISTANT

## 2022-12-16 PROCEDURE — 1159F PR MEDICATION LIST DOCUMENTED IN MEDICAL RECORD: ICD-10-PCS | Mod: CPTII,,, | Performed by: PHYSICIAN ASSISTANT

## 2022-12-16 PROCEDURE — 3074F SYST BP LT 130 MM HG: CPT | Mod: CPTII,,, | Performed by: PHYSICIAN ASSISTANT

## 2022-12-16 PROCEDURE — 99213 OFFICE O/P EST LOW 20 MIN: CPT | Mod: PBBFAC | Performed by: PHYSICIAN ASSISTANT

## 2022-12-16 PROCEDURE — 1159F MED LIST DOCD IN RCRD: CPT | Mod: CPTII,,, | Performed by: PHYSICIAN ASSISTANT

## 2022-12-16 PROCEDURE — 3079F PR MOST RECENT DIASTOLIC BLOOD PRESSURE 80-89 MM HG: ICD-10-PCS | Mod: CPTII,,, | Performed by: PHYSICIAN ASSISTANT

## 2022-12-16 RX ORDER — GABAPENTIN 100 MG/1
100 CAPSULE ORAL EVERY 8 HOURS
Qty: 90 CAPSULE | Refills: 2 | Status: SHIPPED | OUTPATIENT
Start: 2022-12-16 | End: 2023-03-15 | Stop reason: SDUPTHER

## 2022-12-16 RX ORDER — HYDROCODONE BITARTRATE AND ACETAMINOPHEN 5; 325 MG/1; MG/1
1 TABLET ORAL EVERY 12 HOURS PRN
Qty: 30 TABLET | Refills: 0 | Status: SHIPPED | OUTPATIENT
Start: 2023-01-14 | End: 2023-03-15 | Stop reason: SDUPTHER

## 2022-12-16 RX ORDER — HYDROCODONE BITARTRATE AND ACETAMINOPHEN 5; 325 MG/1; MG/1
1 TABLET ORAL EVERY 12 HOURS PRN
Qty: 30 TABLET | Refills: 0 | Status: SHIPPED | OUTPATIENT
Start: 2023-02-13 | End: 2023-03-15 | Stop reason: SDUPTHER

## 2022-12-16 RX ORDER — HYDROCODONE BITARTRATE AND ACETAMINOPHEN 5; 325 MG/1; MG/1
1 TABLET ORAL EVERY 12 HOURS PRN
Qty: 30 TABLET | Refills: 0 | Status: SHIPPED | OUTPATIENT
Start: 2022-12-16 | End: 2023-03-15 | Stop reason: SDUPTHER

## 2022-12-16 RX ORDER — NALOXONE HYDROCHLORIDE 4 MG/.1ML
1 SPRAY NASAL ONCE
Qty: 1 EACH | Refills: 0 | Status: SHIPPED | OUTPATIENT
Start: 2022-12-16 | End: 2022-12-16

## 2023-01-06 ENCOUNTER — OFFICE VISIT (OUTPATIENT)
Dept: FAMILY MEDICINE | Facility: CLINIC | Age: 63
End: 2023-01-06
Payer: COMMERCIAL

## 2023-01-06 VITALS
OXYGEN SATURATION: 96 % | WEIGHT: 153 LBS | HEIGHT: 66 IN | SYSTOLIC BLOOD PRESSURE: 130 MMHG | HEART RATE: 75 BPM | DIASTOLIC BLOOD PRESSURE: 82 MMHG | RESPIRATION RATE: 18 BRPM | BODY MASS INDEX: 24.59 KG/M2 | TEMPERATURE: 98 F

## 2023-01-06 DIAGNOSIS — H61.21 IMPACTED CERUMEN OF RIGHT EAR: Primary | ICD-10-CM

## 2023-01-06 PROCEDURE — 69209 REMOVE IMPACTED EAR WAX UNI: CPT | Mod: RT,,, | Performed by: NURSE PRACTITIONER

## 2023-01-06 PROCEDURE — 99212 OFFICE O/P EST SF 10 MIN: CPT | Mod: 25,,, | Performed by: NURSE PRACTITIONER

## 2023-01-06 PROCEDURE — 99212 PR OFFICE/OUTPT VISIT, EST, LEVL II, 10-19 MIN: ICD-10-PCS | Mod: 25,,, | Performed by: NURSE PRACTITIONER

## 2023-01-06 PROCEDURE — 3075F SYST BP GE 130 - 139MM HG: CPT | Mod: ,,, | Performed by: NURSE PRACTITIONER

## 2023-01-06 PROCEDURE — 1160F PR REVIEW ALL MEDS BY PRESCRIBER/CLIN PHARMACIST DOCUMENTED: ICD-10-PCS | Mod: ,,, | Performed by: NURSE PRACTITIONER

## 2023-01-06 PROCEDURE — 1160F RVW MEDS BY RX/DR IN RCRD: CPT | Mod: ,,, | Performed by: NURSE PRACTITIONER

## 2023-01-06 PROCEDURE — 3075F PR MOST RECENT SYSTOLIC BLOOD PRESS GE 130-139MM HG: ICD-10-PCS | Mod: ,,, | Performed by: NURSE PRACTITIONER

## 2023-01-06 PROCEDURE — 3008F BODY MASS INDEX DOCD: CPT | Mod: ,,, | Performed by: NURSE PRACTITIONER

## 2023-01-06 PROCEDURE — 3008F PR BODY MASS INDEX (BMI) DOCUMENTED: ICD-10-PCS | Mod: ,,, | Performed by: NURSE PRACTITIONER

## 2023-01-06 PROCEDURE — G0008 FLU VACCINE (QUAD) GREATER THAN OR EQUAL TO 3YO PRESERVATIVE FREE IM: ICD-10-PCS | Mod: ,,, | Performed by: NURSE PRACTITIONER

## 2023-01-06 PROCEDURE — G0008 ADMIN INFLUENZA VIRUS VAC: HCPCS | Mod: ,,, | Performed by: NURSE PRACTITIONER

## 2023-01-06 PROCEDURE — 69209 PR REMOVAL IMPACTED CERUMEN USING IRRIGATION/LAVAGE, UNILATERAL: ICD-10-PCS | Mod: RT,,, | Performed by: NURSE PRACTITIONER

## 2023-01-06 PROCEDURE — 90686 FLU VACCINE (QUAD) GREATER THAN OR EQUAL TO 3YO PRESERVATIVE FREE IM: ICD-10-PCS | Mod: ,,, | Performed by: NURSE PRACTITIONER

## 2023-01-06 PROCEDURE — 3079F PR MOST RECENT DIASTOLIC BLOOD PRESSURE 80-89 MM HG: ICD-10-PCS | Mod: ,,, | Performed by: NURSE PRACTITIONER

## 2023-01-06 PROCEDURE — 1159F MED LIST DOCD IN RCRD: CPT | Mod: ,,, | Performed by: NURSE PRACTITIONER

## 2023-01-06 PROCEDURE — 3079F DIAST BP 80-89 MM HG: CPT | Mod: ,,, | Performed by: NURSE PRACTITIONER

## 2023-01-06 PROCEDURE — 1159F PR MEDICATION LIST DOCUMENTED IN MEDICAL RECORD: ICD-10-PCS | Mod: ,,, | Performed by: NURSE PRACTITIONER

## 2023-01-06 PROCEDURE — 90686 IIV4 VACC NO PRSV 0.5 ML IM: CPT | Mod: ,,, | Performed by: NURSE PRACTITIONER

## 2023-01-06 NOTE — PROGRESS NOTES
Right ear cleaned by irrigation. Small amount of wax removed from right ear. Patient tolerated well. No complaints.

## 2023-01-06 NOTE — PROGRESS NOTES
Kaya Go DNP, FNP    [address] [place] [address]     PATIENT NAME: Amador Callahan  : 1960  DATE: 23  MRN: [id number]      Billing Provider: Kaya Go DNP, FNP  Level of Service:   Patient PCP Information       Provider PCP Type    Kaya Go DNP, FNP General            Reason for Visit / Chief Complaint: Otalgia (Reports right ear pain) and Anxiety (PHQ-4 score of 8)       Update PCP  Update Chief Complaint         History of Present Illness / Problem Focused Workflow     Amador Callahan presents to the clinic with Otalgia (Reports right ear pain) and Anxiety (PHQ-4 score of 8)     Otalgia   Pertinent negatives include no abdominal pain, coughing, diarrhea, headaches, hearing loss, rash, sore throat or vomiting.   Anxiety  Symptoms include nervous/anxious behavior. Patient reports no chest pain, nausea, palpitations, shortness of breath or suicidal ideas.       Review of Systems     Review of Systems   Constitutional:  Negative for activity change, appetite change, chills, fatigue and fever.   HENT:  Positive for ear pain. Negative for nasal congestion, hearing loss, postnasal drip and sore throat.    Respiratory:  Negative for cough, chest tightness, shortness of breath and wheezing.    Cardiovascular:  Negative for chest pain, palpitations, leg swelling and claudication.   Gastrointestinal:  Negative for abdominal pain, change in bowel habit, constipation, diarrhea, nausea, vomiting and change in bowel habit.   Genitourinary:  Negative for dysuria.   Musculoskeletal:  Negative for arthralgias, back pain, gait problem and myalgias.   Integumentary:  Negative for rash.   Neurological:  Negative for weakness and headaches.   Psychiatric/Behavioral:  Negative for suicidal ideas. The patient is nervous/anxious.       Medical / Social / Family History     Past Medical History:   Diagnosis Date    Adenomatous polyp of descending colon 2021     Anxiety     Diverticula, colon 5/17/2021    GERD (gastroesophageal reflux disease)     History of cerebrovascular accident     Hyperlipidemia     Hypertension     Screening for malignant neoplasm of colon 5/17/2021       Past Surgical History:   Procedure Laterality Date    EPIDURAL STEROID INJECTION INTO CERVICAL SPINE N/A 12/2/2022    Procedure: Injection-steroid-epidural-cervical, C4/5 EDE;  Surgeon: Angelic Hdz MD;  Location: [facility];  Service: Pain Management;  Laterality: N/A;    heart cath         Social History  Mr. Amador Callahan  reports that he quit smoking about 43 years ago. His smoking use included cigarettes. He started smoking about 45 years ago. He has a 2.00 pack-year smoking history. He has been exposed to tobacco smoke. He has never used smokeless tobacco. He reports that he does not drink alcohol and does not use drugs.    Family History  Mr. Amador Callahan's family history includes Cancer in his father and sister; Heart disease in his mother.    Medications and Allergies     Medications  Outpatient Medications Marked as Taking for the 1/6/23 encounter (Office Visit) with Kaya Go, VERONIKA, FNP   Medication Sig Dispense Refill    atorvastatin (LIPITOR) 40 MG tablet Take 1 tablet (40 mg total) by mouth once daily. 90 tablet 1    busPIRone (BUSPAR) 10 MG tablet Take 1 tablet (10 mg total) by mouth 2 (two) times a day. 180 tablet 1    gabapentin (NEURONTIN) 100 MG capsule Take 1 capsule (100 mg total) by mouth every 8 (eight) hours. 90 capsule 2    HYDROcodone-acetaminophen (NORCO) 5-325 mg per tablet Take 1 tablet by mouth every 12 (twelve) hours as needed for Pain. 30 tablet 0    [START ON 1/14/2023] HYDROcodone-acetaminophen (NORCO) 5-325 mg per tablet Take 1 tablet by mouth every 12 (twelve) hours as needed for Pain. 30 tablet 0    [START ON 2/13/2023] HYDROcodone-acetaminophen (NORCO) 5-325 mg per tablet Take 1 tablet by mouth every 12 (twelve) hours as needed for Pain.  "30 tablet 0    lisinopriL 10 MG tablet Take 1 tablet (10 mg total) by mouth once daily. 90 tablet 1    omeprazole (PRILOSEC) 20 MG capsule Take 1 capsule (20 mg total) by mouth once daily. 90 capsule 1       Allergies  Review of patient's allergies indicates:  No Known Allergies    Physical Examination     Vitals:    01/06/23 1407   BP: 130/82   BP Location: Left arm   Patient Position: Sitting   BP Method: Medium (Automatic)   Pulse: 75   Resp: 18   Temp: 97.7 °F (36.5 °C)   TempSrc: Oral   SpO2: 96%   Weight: 69.4 kg (153 lb)   Height: 5' 6" (1.676 m)     Physical Exam  Vitals and nursing note reviewed.   Constitutional:       General: He is not in acute distress.     Appearance: He is normal weight.   HENT:      Right Ear: There is impacted cerumen.      Mouth/Throat:      Mouth: Mucous membranes are moist.   Eyes:      Pupils: Pupils are equal, round, and reactive to light.   Cardiovascular:      Rate and Rhythm: Normal rate and regular rhythm.      Pulses: Normal pulses.      Heart sounds: No murmur heard.  Pulmonary:      Effort: Pulmonary effort is normal. No respiratory distress.      Breath sounds: Normal breath sounds. No wheezing, rhonchi or rales.   Chest:      Chest wall: No tenderness.   Abdominal:      General: Bowel sounds are normal. There is no distension.      Palpations: Abdomen is soft.      Tenderness: There is no abdominal tenderness. There is no right CVA tenderness, left CVA tenderness, guarding or rebound.   Musculoskeletal:         General: No swelling or tenderness. Normal range of motion.      Cervical back: Normal range of motion and neck supple.      Right lower leg: No edema.      Left lower leg: No edema.   Skin:     General: Skin is warm and dry.   Neurological:      General: No focal deficit present.      Mental Status: He is alert and oriented to person, place, and time.   Psychiatric:         Mood and Affect: Mood normal.         Behavior: Behavior normal.         Thought " Content: Thought content normal.         Judgment: Judgment normal.        Assessment and Plan (including Health Maintenance)      Problem List  Smart Sets  Document Outside HM   :    Plan:         Health Maintenance Due   Topic Date Due    Complete Opioid Risk Tool  Never done       Problem List Items Addressed This Visit    None  Visit Diagnoses       Impacted cerumen of right ear    -  Primary          Impacted cerumen of right ear    Other orders  -     Influenza - Quadrivalent (PF)       Health Maintenance Topics with due status: Not Due       Topic Last Completion Date    Colorectal Cancer Screening 05/17/2021    PROSTATE-SPECIFIC ANTIGEN 09/07/2022    Lipid Panel 12/08/2022    High Dose Statin 01/06/2023         Future Appointments   Date Time Provider Department Center   3/16/2023 10:45 AM DEMETRIO [facility]   4/12/2023  2:00 PM AWV NURSE, [facility] FAMILY [facility]IC[facility] [place]   6/1/2023  2:00 PM Kaya Go DNP, HERVE [facility] [place]        Follow up if symptoms worsen or fail to improve.     Signature:  Kaya Go DNP, HERVE  [address] [place] [address]  Phone #: [phone]  Fax #: [phone]    Date of encounter: 1/6/23    There are no Patient Instructions on file for this visit.

## 2023-03-15 NOTE — PROGRESS NOTES
Subjective:         Patient ID: Amador Callahan is a 62 y.o. male.    Chief Complaint: Shoulder Pain and Back Pain        Pain  This is a chronic problem. The current episode started more than 1 year ago. The problem occurs daily. The problem has been waxing and waning. Associated symptoms include arthralgias, chest pain and neck pain. Pertinent negatives include no anorexia, chills, coughing, diaphoresis, fatigue, fever, sore throat, swollen glands, vertigo or vomiting.   Review of Systems   Constitutional:  Negative for activity change, appetite change, chills, diaphoresis, fatigue, fever and unexpected weight change.   HENT:  Negative for drooling, ear discharge, ear pain, facial swelling, nosebleeds, sore throat, trouble swallowing, voice change and goiter.    Eyes:  Negative for photophobia, pain, discharge, redness and visual disturbance.   Respiratory:  Negative for apnea, cough, choking, chest tightness, shortness of breath, wheezing and stridor.    Cardiovascular:  Positive for chest pain. Negative for palpitations and leg swelling.   Gastrointestinal:  Negative for abdominal distention, anorexia, diarrhea, rectal pain, vomiting and fecal incontinence.   Endocrine: Negative for cold intolerance, heat intolerance, polydipsia, polyphagia and polyuria.   Genitourinary:  Negative for bladder incontinence, dysuria, flank pain and frequency.   Musculoskeletal:  Positive for arthralgias, back pain, leg pain and neck pain.   Integumentary:  Negative for color change and pallor.   Neurological:  Negative for dizziness, vertigo, seizures, syncope, facial asymmetry, speech difficulty, light-headedness, coordination difficulties, memory loss and coordination difficulties.   Hematological:  Negative for adenopathy. Does not bruise/bleed easily.   Psychiatric/Behavioral:  Negative for agitation, behavioral problems, confusion, decreased concentration, dysphoric mood, hallucinations, self-injury and suicidal ideas. The  "patient is not nervous/anxious and is not hyperactive.          Past Medical History:   Diagnosis Date    Adenomatous polyp of descending colon 2021    Anxiety     Diverticula, colon 2021    GERD (gastroesophageal reflux disease)     History of cerebrovascular accident     Hyperlipidemia     Hypertension     Screening for malignant neoplasm of colon 2021     Past Surgical History:   Procedure Laterality Date    EPIDURAL STEROID INJECTION INTO CERVICAL SPINE N/A 2022    Procedure: Injection-steroid-epidural-cervical, C4/5 EDE;  Surgeon: Angelic Hdz MD;  Location: [facility];  Service: Pain Management;  Laterality: N/A;    heart cath       Social History     Socioeconomic History    Marital status: Single   Tobacco Use    Smoking status: Former     Packs/day: 1.00     Years: 2.00     Pack years: 2.00     Types: Cigarettes     Start date:      Quit date:      Years since quittin.2     Passive exposure: Past    Smokeless tobacco: Never   Substance and Sexual Activity    Alcohol use: Never    Drug use: Never    Sexual activity: Not Currently     Family History   Problem Relation Age of Onset    Heart disease Mother     Cancer Father     Cancer Sister      Review of patient's allergies indicates:  No Known Allergies     Objective:  Vitals:    23 1106   BP: (!) 142/79   Pulse: 82   Resp: 18   Weight: 72.1 kg (159 lb)   Height: 5' 6" (1.676 m)   PainSc:   7         Physical Exam  Vitals and nursing note reviewed. Exam conducted with a chaperone present.   Constitutional:       General: He is awake.      Appearance: Normal appearance. He is not ill-appearing, toxic-appearing or diaphoretic.   HENT:      Head: Normocephalic and atraumatic.      Nose: Nose normal.      Mouth/Throat:      Mouth: Mucous membranes are moist.      Pharynx: Oropharynx is clear.   Eyes:      Conjunctiva/sclera: Conjunctivae normal.      Pupils: Pupils are equal, round, and reactive to light. "   Cardiovascular:      Rate and Rhythm: Normal rate.   Pulmonary:      Effort: Pulmonary effort is normal. No respiratory distress.   Abdominal:      Palpations: Abdomen is soft.      Tenderness: There is no guarding.   Musculoskeletal:         General: Normal range of motion.      Cervical back: Normal range of motion and neck supple. No rigidity.   Skin:     General: Skin is warm and dry.      Coloration: Skin is not jaundiced or pale.   Neurological:      General: No focal deficit present.      Mental Status: He is alert and oriented to person, place, and time. Mental status is at baseline.      Cranial Nerves: No cranial nerve deficit (II-XII).   Psychiatric:         Mood and Affect: Mood normal.         Behavior: Behavior normal. Behavior is cooperative.         Thought Content: Thought content normal.         FL Fluoro for Pain Management  See OP Notes for results.     IMPRESSION: See OP Notes for results.     This procedure was auto-finalized by: Virtual Radiologist         Office Visit on 12/08/2022   Component Date Value Ref Range Status    Sodium 12/08/2022 138  136 - 145 mmol/L Final    Potassium 12/08/2022 4.2  3.5 - 5.1 mmol/L Final    Chloride 12/08/2022 107  98 - 107 mmol/L Final    CO2 12/08/2022 26  21 - 32 mmol/L Final    Anion Gap 12/08/2022 9  7 - 16 mmol/L Final    Glucose 12/08/2022 121 (H)  74 - 106 mg/dL Final    BUN 12/08/2022 16  7 - 18 mg/dL Final    Creatinine 12/08/2022 1.04  0.70 - 1.30 mg/dL Final    BUN/Creatinine Ratio 12/08/2022 15  6 - 20 Final    Calcium 12/08/2022 9.3  8.5 - 10.1 mg/dL Final    Total Protein 12/08/2022 7.8  6.4 - 8.2 g/dL Final    Albumin 12/08/2022 3.6  3.5 - 5.0 g/dL Final    Globulin 12/08/2022 4.2 (H)  2.0 - 4.0 g/dL Final    A/G Ratio 12/08/2022 0.9   Final    Bilirubin, Total 12/08/2022 0.3  >0.0 - 1.2 mg/dL Final    Alk Phos 12/08/2022 116 (H)  45 - 115 U/L Final    ALT 12/08/2022 46  16 - 61 U/L Final    AST 12/08/2022 13 (L)  15 - 37 U/L Final    eGFR  12/08/2022 81  >=60 mL/min/1.73m² Final    Triglycerides 12/08/2022 232 (H)  35 - 150 mg/dL Final    Cholesterol 12/08/2022 148  0 - 200 mg/dL Final    HDL Cholesterol 12/08/2022 41  40 - 60 mg/dL Final    Cholesterol/HDL Ratio (Risk Factor) 12/08/2022 3.6   Final    Non-HDL 12/08/2022 107  mg/dL Final    LDL Calculated 12/08/2022 61  mg/dL Final    LDL/HDL 12/08/2022 1.5   Final    VLDL 12/08/2022 46  mg/dL Final    WBC 12/08/2022 11.50 (H)  4.50 - 11.00 K/uL Final    RBC 12/08/2022 5.37  4.60 - 6.20 M/uL Final    Hemoglobin 12/08/2022 14.4  13.5 - 18.0 g/dL Final    Hematocrit 12/08/2022 47.0  40.0 - 54.0 % Final    MCV 12/08/2022 87.5  80.0 - 96.0 fL Final    MCH 12/08/2022 26.8 (L)  27.0 - 31.0 pg Final    MCHC 12/08/2022 30.6 (L)  32.0 - 36.0 g/dL Final    RDW 12/08/2022 13.0  11.5 - 14.5 % Final    Platelet Count 12/08/2022 285  150 - 400 K/uL Final    MPV 12/08/2022 10.8  9.4 - 12.4 fL Final    Neutrophils % 12/08/2022 76.5 (H)  53.0 - 65.0 % Final    Lymphocytes % 12/08/2022 15.0 (L)  27.0 - 41.0 % Final    Monocytes % 12/08/2022 7.1 (H)  2.0 - 6.0 % Final    Eosinophils % 12/08/2022 0.6 (L)  1.0 - 4.0 % Final    Basophils % 12/08/2022 0.3  0.0 - 1.0 % Final    Immature Granulocytes % 12/08/2022 0.5 (H)  0.0 - 0.4 % Final    nRBC, Auto 12/08/2022 0.0  <=0.0 % Final    Neutrophils, Abs 12/08/2022 8.79 (H)  1.80 - 7.70 K/uL Final    Lymphocytes, Absolute 12/08/2022 1.73  1.00 - 4.80 K/uL Final    Monocytes, Absolute 12/08/2022 0.82 (H)  0.00 - 0.80 K/uL Final    Eosinophils, Absolute 12/08/2022 0.07  0.00 - 0.50 K/uL Final    Basophils, Absolute 12/08/2022 0.03  0.00 - 0.20 K/uL Final    Immature Granulocytes, Absolute 12/08/2022 0.06 (H)  0.00 - 0.04 K/uL Final    nRBC, Absolute 12/08/2022 0.00  <=0.00 x10e3/uL Final    Diff Type 12/08/2022 Auto   Final   Office Visit on 11/09/2022   Component Date Value Ref Range Status    POC Amphetamines 11/09/2022 Negative  Negative, Inconclusive Final    POC Barbiturates  11/09/2022 Negative  Negative, Inconclusive Final    POC Benzodiazepines 11/09/2022 Negative  Negative, Inconclusive Final    POC Cocaine 11/09/2022 Negative  Negative, Inconclusive Final    POC THC 11/09/2022 Negative  Negative, Inconclusive Final    POC Methadone 11/09/2022 Negative  Negative, Inconclusive Final    POC Methamphetamine 11/09/2022 Negative  Negative, Inconclusive Final    POC Opiates 11/09/2022 Negative  Negative, Inconclusive Final    POC Oxycodone 11/09/2022 Negative  Negative, Inconclusive Final    POC Phencyclidine 11/09/2022 Negative  Negative, Inconclusive Final    POC Methylenedioxymethamphetamine * 11/09/2022 Negative  Negative, Inconclusive Final    POC Tricyclic Antidepressants 11/09/2022 Negative  Negative, Inconclusive Final    POC Buprenorphine 11/09/2022 Negative   Final     Acceptable 11/09/2022 Yes   Final    POC Temperature (Urine) 11/09/2022 94   Final         Orders Placed This Encounter   Procedures    Drug Screen Definitive 14, Urine     Standing Status:   Future     Number of Occurrences:   1     Standing Expiration Date:   5/14/2024     Order Specific Question:   Specimen Source     Answer:   Urine    POCT Urine Drug Screen Presump     Interpretive Information:     Negative:  No drug detected at the cut off level.   Positive:  This result represents presumptive positive for the   tested drug, other substances may yield a positive response other   than the analyte of interest. This result should be utilized for   diagnostic purpose only. Confirmation testing will be performed upon physician request only.              Requested Prescriptions     Signed Prescriptions Disp Refills    gabapentin (NEURONTIN) 100 MG capsule 90 capsule 2     Sig: Take 1 capsule (100 mg total) by mouth every 8 (eight) hours.    HYDROcodone-acetaminophen (NORCO) 5-325 mg per tablet 30 tablet 0     Sig: Take 1 tablet by mouth every 12 (twelve) hours as needed for Pain.     HYDROcodone-acetaminophen (NORCO) 5-325 mg per tablet 30 tablet 0     Sig: Take 1 tablet by mouth every 12 (twelve) hours as needed for Pain.    HYDROcodone-acetaminophen (NORCO) 5-325 mg per tablet 30 tablet 0     Sig: Take 1 tablet by mouth every 12 (twelve) hours as needed for Pain.       Assessment:     1. Cervical radiculopathy MRI May 2022    2. Chronic right shoulder pain    3. Osteoarthrosis multiple sites, not specified as generalized    4. Encounter for long-term (current) use of other medications         A's of Opioid Risk Assessment  Activity:Patient can perform ADL.   Analgesia:Patients pain is partially controlled by current medication. Patient has tried OTC medications such as Tylenol and Ibuprofen with out relief.   Adverse Effects: Patient denies constipation or sedation.  Aberrant Behavior:  reviewed with no aberrant drug seeking/taking behavior.  Overdose reversal drug naloxone discussed    Drug screen reviewed    MRI cervical spine [facility] May 2022 which shows severe right neuroforaminal narrowing central canal narrowing due to herniated discs, severe right neural foraminal narrowing C5/6 multiple level degenerative change      Plan:    Narcan December 2022    Bell's palsy left face    Follows orthopedics [facility]    Definitive drug screen negative, last refill date February 13, 2023, patient has been on medication 3 days due to office visit scheduling error on our end not patient's fault        He had cervical C4/5 EDE # 1, December 2022, he states he had 100% relief after procedure, the procedure did help improve his level of function    He is doing quite well at this time he states current medications helping control his discomfort    He would like to continue with conservative management for now     Continue home exercise program as directed    Continue Norco as directed     Follow-up 3 months    Dr. Hdz December 2023    Bring original prescription medication  bottles/container/box with labels to each visit    Pill count    Physical therapy    Massage therapy declines

## 2023-03-16 ENCOUNTER — OFFICE VISIT (OUTPATIENT)
Dept: PAIN MEDICINE | Facility: CLINIC | Age: 63
End: 2023-03-16
Payer: COMMERCIAL

## 2023-03-16 VITALS
DIASTOLIC BLOOD PRESSURE: 79 MMHG | BODY MASS INDEX: 25.55 KG/M2 | HEIGHT: 66 IN | SYSTOLIC BLOOD PRESSURE: 142 MMHG | WEIGHT: 159 LBS | HEART RATE: 82 BPM | RESPIRATION RATE: 18 BRPM

## 2023-03-16 DIAGNOSIS — M54.12 CERVICAL RADICULOPATHY: Primary | Chronic | ICD-10-CM

## 2023-03-16 DIAGNOSIS — Z79.899 ENCOUNTER FOR LONG-TERM (CURRENT) USE OF OTHER MEDICATIONS: ICD-10-CM

## 2023-03-16 DIAGNOSIS — M89.49 OSTEOARTHROSIS MULTIPLE SITES, NOT SPECIFIED AS GENERALIZED: Chronic | ICD-10-CM

## 2023-03-16 DIAGNOSIS — G89.29 CHRONIC RIGHT SHOULDER PAIN: Chronic | ICD-10-CM

## 2023-03-16 DIAGNOSIS — M25.511 CHRONIC RIGHT SHOULDER PAIN: Chronic | ICD-10-CM

## 2023-03-16 LAB
CTP QC/QA: YES
POC (AMP) AMPHETAMINE: NEGATIVE
POC (BAR) BARBITURATES: NEGATIVE
POC (BUP) BUPRENORPHINE: NEGATIVE
POC (BZO) BENZODIAZEPINES: NEGATIVE
POC (COC) COCAINE: NEGATIVE
POC (MDMA) METHYLENEDIOXYMETHAMPHETAMINE 3,4: NEGATIVE
POC (MET) METHAMPHETAMINE: NEGATIVE
POC (MOP) OPIATES: NEGATIVE
POC (MTD) METHADONE: NEGATIVE
POC (OXY) OXYCODONE: NEGATIVE
POC (PCP) PHENCYCLIDINE: NEGATIVE
POC (TCA) TRICYCLIC ANTIDEPRESSANTS: NEGATIVE
POC TEMPERATURE (URINE): 90
POC THC: NEGATIVE

## 2023-03-16 PROCEDURE — G0481 DRUG TEST DEF 8-14 CLASSES: HCPCS | Mod: ,,, | Performed by: CLINICAL MEDICAL LABORATORY

## 2023-03-16 PROCEDURE — 99214 PR OFFICE/OUTPT VISIT, EST, LEVL IV, 30-39 MIN: ICD-10-PCS | Mod: S$PBB,,, | Performed by: PHYSICIAN ASSISTANT

## 2023-03-16 PROCEDURE — G0481 PR DRUG TEST DEF 8-14 CLASSES: ICD-10-PCS | Mod: ,,, | Performed by: CLINICAL MEDICAL LABORATORY

## 2023-03-16 PROCEDURE — 3008F BODY MASS INDEX DOCD: CPT | Mod: CPTII,,, | Performed by: PHYSICIAN ASSISTANT

## 2023-03-16 PROCEDURE — 1159F PR MEDICATION LIST DOCUMENTED IN MEDICAL RECORD: ICD-10-PCS | Mod: CPTII,,, | Performed by: PHYSICIAN ASSISTANT

## 2023-03-16 PROCEDURE — 80305 DRUG TEST PRSMV DIR OPT OBS: CPT | Mod: PBBFAC | Performed by: PHYSICIAN ASSISTANT

## 2023-03-16 PROCEDURE — 3077F SYST BP >= 140 MM HG: CPT | Mod: CPTII,,, | Performed by: PHYSICIAN ASSISTANT

## 2023-03-16 PROCEDURE — 99214 OFFICE O/P EST MOD 30 MIN: CPT | Mod: S$PBB,,, | Performed by: PHYSICIAN ASSISTANT

## 2023-03-16 PROCEDURE — 3078F PR MOST RECENT DIASTOLIC BLOOD PRESSURE < 80 MM HG: ICD-10-PCS | Mod: CPTII,,, | Performed by: PHYSICIAN ASSISTANT

## 2023-03-16 PROCEDURE — 4010F ACE/ARB THERAPY RXD/TAKEN: CPT | Mod: CPTII,,, | Performed by: PHYSICIAN ASSISTANT

## 2023-03-16 PROCEDURE — 1159F MED LIST DOCD IN RCRD: CPT | Mod: CPTII,,, | Performed by: PHYSICIAN ASSISTANT

## 2023-03-16 PROCEDURE — 99214 OFFICE O/P EST MOD 30 MIN: CPT | Mod: PBBFAC | Performed by: PHYSICIAN ASSISTANT

## 2023-03-16 PROCEDURE — 3008F PR BODY MASS INDEX (BMI) DOCUMENTED: ICD-10-PCS | Mod: CPTII,,, | Performed by: PHYSICIAN ASSISTANT

## 2023-03-16 PROCEDURE — 3077F PR MOST RECENT SYSTOLIC BLOOD PRESSURE >= 140 MM HG: ICD-10-PCS | Mod: CPTII,,, | Performed by: PHYSICIAN ASSISTANT

## 2023-03-16 PROCEDURE — 3078F DIAST BP <80 MM HG: CPT | Mod: CPTII,,, | Performed by: PHYSICIAN ASSISTANT

## 2023-03-16 PROCEDURE — 4010F PR ACE/ARB THEARPY RXD/TAKEN: ICD-10-PCS | Mod: CPTII,,, | Performed by: PHYSICIAN ASSISTANT

## 2023-03-16 RX ORDER — GABAPENTIN 100 MG/1
100 CAPSULE ORAL EVERY 8 HOURS
Qty: 90 CAPSULE | Refills: 2 | Status: SHIPPED | OUTPATIENT
Start: 2023-03-16 | End: 2023-06-12 | Stop reason: SDUPTHER

## 2023-03-16 RX ORDER — HYDROCODONE BITARTRATE AND ACETAMINOPHEN 5; 325 MG/1; MG/1
1 TABLET ORAL EVERY 12 HOURS PRN
Qty: 30 TABLET | Refills: 0 | Status: SHIPPED | OUTPATIENT
Start: 2023-05-15 | End: 2023-06-12 | Stop reason: SDUPTHER

## 2023-03-16 RX ORDER — HYDROCODONE BITARTRATE AND ACETAMINOPHEN 5; 325 MG/1; MG/1
1 TABLET ORAL EVERY 12 HOURS PRN
Qty: 30 TABLET | Refills: 0 | Status: SHIPPED | OUTPATIENT
Start: 2023-04-15 | End: 2023-06-12 | Stop reason: SDUPTHER

## 2023-03-16 RX ORDER — HYDROCODONE BITARTRATE AND ACETAMINOPHEN 5; 325 MG/1; MG/1
1 TABLET ORAL EVERY 12 HOURS PRN
Qty: 30 TABLET | Refills: 0 | Status: SHIPPED | OUTPATIENT
Start: 2023-03-16 | End: 2023-06-12 | Stop reason: SDUPTHER

## 2023-03-24 LAB
6-ACETYLMORPHINE, URINE (RUSH): NEGATIVE 10 NG/ML
7-AMINOCLONAZEPAM, URINE (RUSH): NEGATIVE 25 NG/ML
A-HYDROXYALPRAZOLAM, URINE (RUSH): NEGATIVE 25 NG/ML
ACETYL FENTANYL, URINE (RUSH): NEGATIVE 2.5 NG/ML
ACETYL NORFENTANYL OXALATE, URINE (RUSH): NEGATIVE 5 NG/ML
AMPHET UR QL SCN: NEGATIVE
BENZOYLECGONINE, URINE (RUSH): NEGATIVE 100 NG/ML
BUPRENORPHINE UR QL SCN: NEGATIVE 25 NG/ML
CODEINE, URINE (RUSH): NEGATIVE 25 NG/ML
CREAT UR-MCNC: 390 MG/DL (ref 39–259)
EDDP, URINE (RUSH): NEGATIVE 25 NG/ML
FENTANYL, URINE (RUSH): NEGATIVE 2.5 NG/ML
HYDROCODONE, URINE (RUSH): NEGATIVE 25 NG/ML
HYDROMORPHONE, URINE (RUSH): NEGATIVE 25 NG/ML
LORAZEPAM, URINE (RUSH): NEGATIVE 25 NG/ML
METHADONE UR QL SCN: NEGATIVE 25 NG/ML
METHAMPHET UR QL SCN: NEGATIVE
MORPHINE, URINE (RUSH): NEGATIVE 25 NG/ML
NORBUPRENORPHINE, URINE (RUSH): NEGATIVE 25 NG/ML
NORDIAZEPAM, URINE (RUSH): NEGATIVE 25 NG/ML
NORFENTANYL OXALATE, URINE (RUSH): NEGATIVE 5 NG/ML
NORHYDROCODONE, URINE (RUSH): NEGATIVE 50 NG/ML
NOROXYCODONE HCL, URINE (RUSH): NEGATIVE 50 NG/ML
OXAZEPAM, URINE (RUSH): NEGATIVE 25 NG/ML
OXYCODONE UR QL SCN: NEGATIVE 25 NG/ML
OXYMORPHONE, URINE (RUSH): NEGATIVE 25 NG/ML
PH UR STRIP: 6 PH UNITS
SP GR UR STRIP: 1.03
TAPENTADOL, URINE (RUSH): NEGATIVE 25 NG/ML
TEMAZEPAM, URINE (RUSH): NEGATIVE 25 NG/ML
THC-COOH, URINE (RUSH): NEGATIVE 25 NG/ML
TRAMADOL, URINE (RUSH): NEGATIVE 100 NG/ML

## 2023-05-17 ENCOUNTER — OFFICE VISIT (OUTPATIENT)
Dept: FAMILY MEDICINE | Facility: CLINIC | Age: 63
End: 2023-05-17
Payer: COMMERCIAL

## 2023-05-17 VITALS
WEIGHT: 154 LBS | SYSTOLIC BLOOD PRESSURE: 130 MMHG | RESPIRATION RATE: 18 BRPM | OXYGEN SATURATION: 96 % | TEMPERATURE: 98 F | BODY MASS INDEX: 24.75 KG/M2 | HEART RATE: 92 BPM | HEIGHT: 66 IN | DIASTOLIC BLOOD PRESSURE: 70 MMHG

## 2023-05-17 DIAGNOSIS — Z00.00 ENCOUNTER FOR SUBSEQUENT ANNUAL WELLNESS VISIT (AWV) IN MEDICARE PATIENT: Primary | ICD-10-CM

## 2023-05-17 DIAGNOSIS — F41.9 ANXIETY: ICD-10-CM

## 2023-05-17 DIAGNOSIS — Z13.31 POSITIVE DEPRESSION SCREENING: ICD-10-CM

## 2023-05-17 DIAGNOSIS — I10 HYPERTENSION, UNSPECIFIED TYPE: ICD-10-CM

## 2023-05-17 DIAGNOSIS — K21.9 GASTROESOPHAGEAL REFLUX DISEASE, UNSPECIFIED WHETHER ESOPHAGITIS PRESENT: ICD-10-CM

## 2023-05-17 DIAGNOSIS — Z86.73 HISTORY OF CEREBROVASCULAR ACCIDENT: ICD-10-CM

## 2023-05-17 DIAGNOSIS — E78.5 HYPERLIPIDEMIA, UNSPECIFIED HYPERLIPIDEMIA TYPE: ICD-10-CM

## 2023-05-17 DIAGNOSIS — R29.810 FACIAL DROOP: ICD-10-CM

## 2023-05-17 PROCEDURE — G0439 PPPS, SUBSEQ VISIT: HCPCS | Mod: ,,, | Performed by: NURSE PRACTITIONER

## 2023-05-17 PROCEDURE — 1159F PR MEDICATION LIST DOCUMENTED IN MEDICAL RECORD: ICD-10-PCS | Mod: ,,, | Performed by: NURSE PRACTITIONER

## 2023-05-17 PROCEDURE — 3075F SYST BP GE 130 - 139MM HG: CPT | Mod: ,,, | Performed by: NURSE PRACTITIONER

## 2023-05-17 PROCEDURE — 4010F ACE/ARB THERAPY RXD/TAKEN: CPT | Mod: ,,, | Performed by: NURSE PRACTITIONER

## 2023-05-17 PROCEDURE — 3078F DIAST BP <80 MM HG: CPT | Mod: ,,, | Performed by: NURSE PRACTITIONER

## 2023-05-17 PROCEDURE — 1160F PR REVIEW ALL MEDS BY PRESCRIBER/CLIN PHARMACIST DOCUMENTED: ICD-10-PCS | Mod: ,,, | Performed by: NURSE PRACTITIONER

## 2023-05-17 PROCEDURE — 3008F BODY MASS INDEX DOCD: CPT | Mod: ,,, | Performed by: NURSE PRACTITIONER

## 2023-05-17 PROCEDURE — 1160F RVW MEDS BY RX/DR IN RCRD: CPT | Mod: ,,, | Performed by: NURSE PRACTITIONER

## 2023-05-17 PROCEDURE — 3075F PR MOST RECENT SYSTOLIC BLOOD PRESS GE 130-139MM HG: ICD-10-PCS | Mod: ,,, | Performed by: NURSE PRACTITIONER

## 2023-05-17 PROCEDURE — 1159F MED LIST DOCD IN RCRD: CPT | Mod: ,,, | Performed by: NURSE PRACTITIONER

## 2023-05-17 PROCEDURE — 3078F PR MOST RECENT DIASTOLIC BLOOD PRESSURE < 80 MM HG: ICD-10-PCS | Mod: ,,, | Performed by: NURSE PRACTITIONER

## 2023-05-17 PROCEDURE — 4010F PR ACE/ARB THEARPY RXD/TAKEN: ICD-10-PCS | Mod: ,,, | Performed by: NURSE PRACTITIONER

## 2023-05-17 PROCEDURE — 3008F PR BODY MASS INDEX (BMI) DOCUMENTED: ICD-10-PCS | Mod: ,,, | Performed by: NURSE PRACTITIONER

## 2023-05-17 PROCEDURE — G0439 PR MEDICARE ANNUAL WELLNESS SUBSEQUENT VISIT: ICD-10-PCS | Mod: ,,, | Performed by: NURSE PRACTITIONER

## 2023-05-17 NOTE — PROGRESS NOTES
[facility]   [facility]      PATIENT NAME: Amador Callahan   : 1960    AGE: 62 y.o. DATE: 2023   MRN: [id number]        Reason for Visit / Chief Complaint: Medicare AWV (Subsequent Medicare AWV )        Amador Callahan presents for a Subsequent Medicare AWV today.     The following components were reviewed and updated:    Medical/Social/Family History:  Past Medical History:   Diagnosis Date    Adenomatous polyp of descending colon 2021    Anxiety     Diverticula, colon 2021    GERD (gastroesophageal reflux disease)     History of cerebrovascular accident     Hyperlipidemia     Hypertension     Screening for malignant neoplasm of colon 2021        Family History   Problem Relation Age of Onset    Heart disease Mother     Cancer Father     Cancer Sister         Social History     Tobacco Use   Smoking Status Former    Packs/day: 1.00    Years: 2.00    Pack years: 2.00    Types: Cigarettes    Start date:     Quit date:     Years since quittin.4    Passive exposure: Past   Smokeless Tobacco Never      Social History     Substance and Sexual Activity   Alcohol Use Never       Family History   Problem Relation Age of Onset    Heart disease Mother     Cancer Father     Cancer Sister        Past Surgical History:   Procedure Laterality Date    EPIDURAL STEROID INJECTION INTO CERVICAL SPINE N/A 2022    Procedure: Injection-steroid-epidural-cervical, C4/5 EDE;  Surgeon: Angelic Hdz MD;  Location: [facility];  Service: Pain Management;  Laterality: N/A;    heart cath           Allergies and Current Medications   Review of patient's allergies indicates:  No Known Allergies    Current Outpatient Medications:     atorvastatin (LIPITOR) 40 MG tablet, Take 1 tablet (40 mg total) by mouth once daily., Disp: 90 tablet, Rfl: 1    busPIRone (BUSPAR) 10 MG tablet, Take 1 tablet (10 mg total) by mouth 2 (two) times a day., Disp: 180 tablet, Rfl:  1    gabapentin (NEURONTIN) 100 MG capsule, Take 1 capsule (100 mg total) by mouth every 8 (eight) hours., Disp: 90 capsule, Rfl: 2    HYDROcodone-acetaminophen (NORCO) 5-325 mg per tablet, Take 1 tablet by mouth every 12 (twelve) hours as needed for Pain., Disp: 30 tablet, Rfl: 0    HYDROcodone-acetaminophen (NORCO) 5-325 mg per tablet, Take 1 tablet by mouth every 12 (twelve) hours as needed for Pain., Disp: 30 tablet, Rfl: 0    HYDROcodone-acetaminophen (NORCO) 5-325 mg per tablet, Take 1 tablet by mouth every 12 (twelve) hours as needed for Pain., Disp: 30 tablet, Rfl: 0    lisinopriL 10 MG tablet, Take 1 tablet (10 mg total) by mouth once daily., Disp: 90 tablet, Rfl: 1    omeprazole (PRILOSEC) 20 MG capsule, Take 1 capsule (20 mg total) by mouth once daily., Disp: 90 capsule, Rfl: 1    cyclobenzaprine (FLEXERIL) 5 MG tablet, Take 1 tablet (5 mg total) by mouth every 8 (eight) hours as needed for Muscle spasms. (Patient not taking: Reported on 12/8/2022), Disp: 30 tablet, Rfl: 0    Health Risk Assessment   Fall Risk: No   Advance Directive:  Does not have an advanced directive. Verbal education and written education included in today's AVS.   Depression: PHQ9 score: 12    HTN: DASH diet, exercise, weight management, med compliance, home BP monitoring, and follow-up discussed.   T2DM: No   Tobacco use: Former. 2 pack years  STI: Not at risk    Alcohol misuse: No   Statin Use: Yes    Opioid Risk Score         Value Time User    Opioid Risk Score  0 5/17/2023  1:14 PM Susan Payan RN               Health Risk Assessment  What is your age?:  (62)  Are you male or female?: Male  During the past four weeks, how much have you been bothered by emotional problems such as feeling anxious, depressed, irritable, sad, or downhearted and blue?: Moderately  During the past five weeks, has your physical and/or emotional health limited your social activities with family, friends, neighbors, or groups?: Not at all  During  the past four weeks, how much bodily pain have you generally had?: Moderate pain  During the past four weeks, was someone available to help if you needed and wanted help?: Yes, some  During the past four weeks, what was the hardest physical activity you could do for at least two minutes?: Moderate  Can you get to places out of walking distance without help?  (For example, can you travel alone on buses or taxis, or drive your own car?): Yes  Can you go shopping for groceries or clothes without someone's help?: Yes  Can you prepare your own meals?: Yes  Can you do your own housework without help?: Yes  Because of any health problems, do you need the help of another person with your personal care needs such as eating, bathing, dressing, or getting around the house?: No  Can you handle your own money without help?: Yes  During the past four weeks, how would you rate your health in general?: Fair  How have things been going for you during the past four weeks?: Good and bad parts about equal  Are you having difficulties driving your car?: No  Do you always fasten your seat belt when you are in a car?: Yes, usually  How often in the past four weeks have you been bothered by falling or dizzy when standing up?: Sometimes  How often in the past four weeks have you been bothered by sexual problems?: Never  How often in the past four weeks have you been bothered by trouble eating well?: Never  How often in the past four weeks have you been bothered by teeth or denture problems?: Never  How often in the past four weeks have you been bothered with problems using the telephone?: Never  How often in the past four weeks have you been bothered by tiredness or fatigue?: Sometimes  Have you fallen two or more times in the past year?: No  Are you afraid of falling?: No  Are you a smoker?: No  During the past four weeks, how many drinks of wine, beer, or other alcoholic beverages did you have?: No alcohol at all  Do you exercise for  about 20 minutes three or more days a week?: Yes, some of the time  Have you been given any information to help you with hazards in your house that might hurt you?: Yes  Have you been given any information to help you with keeping track of your medications?: Yes  How often do you have trouble taking medicines the way you've been told to take them?: I always take them as prescribed  How confident are you that you can control and manage most of your health problems?: Very confident  What is your race? (Check all that apply.):     Health Maintenance       Health Maintenance Topics with due status: Not Due       Topic Last Completion Date    Colorectal Cancer Screening 05/17/2021    PROSTATE-SPECIFIC ANTIGEN 09/07/2022    Lipid Panel 12/08/2022    High Dose Statin 05/17/2023     Health Maintenance Due   Topic Date Due    TETANUS VACCINE  Never done    Shingles Vaccine (1 of 2) Never done    COVID-19 Vaccine (4 - Pfizer series) 12/01/2021       Incontinence  Bowel: No  Bladder: No    Lab results available in Epic or see dates from [facility] above:   Lab Results   Component Value Date    CHOL 148 12/08/2022    CHOL 150 06/02/2022    CHOL 126 12/03/2021     Lab Results   Component Value Date    HDL 41 12/08/2022    HDL 42 06/02/2022    HDL 38 (L) 12/03/2021     Lab Results   Component Value Date    LDLCALC 61 12/08/2022    LDLCALC 83 06/02/2022    LDLCALC 62 12/03/2021     Lab Results   Component Value Date    TRIG 232 (H) 12/08/2022    TRIG 123 06/02/2022    TRIG 132 12/03/2021     Lab Results   Component Value Date    CHOLHDL 3.6 12/08/2022    CHOLHDL 3.6 06/02/2022    CHOLHDL 3.3 12/03/2021       No results found for: LABA1C, HGBA1C    Sodium   Date Value Ref Range Status   12/08/2022 138 136 - 145 mmol/L Final     Potassium   Date Value Ref Range Status   12/08/2022 4.2 3.5 - 5.1 mmol/L Final     Chloride   Date Value Ref Range Status   12/08/2022 107 98 - 107 mmol/L Final     CO2   Date Value Ref Range Status  "  12/08/2022 26 21 - 32 mmol/L Final     Glucose   Date Value Ref Range Status   12/08/2022 121 (H) 74 - 106 mg/dL Final     BUN   Date Value Ref Range Status   12/08/2022 16 7 - 18 mg/dL Final     Creatinine   Date Value Ref Range Status   12/08/2022 1.04 0.70 - 1.30 mg/dL Final     Calcium   Date Value Ref Range Status   12/08/2022 9.3 8.5 - 10.1 mg/dL Final     Total Protein   Date Value Ref Range Status   12/08/2022 7.8 6.4 - 8.2 g/dL Final     Albumin   Date Value Ref Range Status   12/08/2022 3.6 3.5 - 5.0 g/dL Final     Bilirubin, Total   Date Value Ref Range Status   12/08/2022 0.3 >0.0 - 1.2 mg/dL Final     Alk Phos   Date Value Ref Range Status   12/08/2022 116 (H) 45 - 115 U/L Final     AST   Date Value Ref Range Status   12/08/2022 13 (L) 15 - 37 U/L Final     ALT   Date Value Ref Range Status   12/08/2022 46 16 - 61 U/L Final     Anion Gap   Date Value Ref Range Status   12/08/2022 9 7 - 16 mmol/L Final     eGFR   Date Value Ref Range Status   06/02/2022 80 >=60 mL/min/1.73m² Final         Lab Results   Component Value Date    PSA 0.284 09/07/2022    (Delete this line if female pt)        Care Team   PCP: Dr.Betsy Go, DNP, FNP    Eye specialist:   Pain Management: Dr.Carol Hdz/Cam,PA       **See Completed Assessments for Annual Wellness visit within the encounter summary    The following assessments were completed & reviewed:  Depression Screening  Cognitive function Screening  Timed Get Up Test  Whisper Test  Vision Screen  Health Risk Assessment  Checklist of ADLs and IADLs      Objective  Vitals:    05/17/23 1300   BP: 130/70   Pulse: 92   Resp: 18   Temp: 97.7 °F (36.5 °C)   TempSrc: Oral   SpO2: 96%   Weight: 69.9 kg (154 lb)   Height: 5' 6" (1.676 m)   PainSc: 0-No pain      Body mass index is 24.86 kg/m².  Ideal body weight: 63.8 kg (140 lb 10.5 oz)       Physical Exam  Vitals and nursing note reviewed.   Constitutional:       General: He is not in acute distress.     " Appearance: He is normal weight.   HENT:      Mouth/Throat:      Mouth: Mucous membranes are moist.      Comments: Mouth drawn--chronic  Eyes:      Pupils: Pupils are equal, round, and reactive to light.   Cardiovascular:      Rate and Rhythm: Normal rate and regular rhythm.      Pulses: Normal pulses.      Heart sounds: No murmur heard.  Pulmonary:      Effort: Pulmonary effort is normal. No respiratory distress.      Breath sounds: Normal breath sounds. No wheezing, rhonchi or rales.   Chest:      Chest wall: No tenderness.   Abdominal:      General: Bowel sounds are normal. There is no distension.      Palpations: Abdomen is soft.      Tenderness: There is no abdominal tenderness. There is no right CVA tenderness, left CVA tenderness, guarding or rebound.   Musculoskeletal:         General: No swelling or tenderness. Normal range of motion.      Cervical back: Normal range of motion and neck supple.      Right lower leg: No edema.      Left lower leg: No edema.   Skin:     General: Skin is warm and dry.   Neurological:      Mental Status: He is alert and oriented to person, place, and time. Mental status is at baseline.   Psychiatric:         Mood and Affect: Mood normal.         Behavior: Behavior normal.         Thought Content: Thought content normal.         Judgment: Judgment normal.         Assessment:     1. Encounter for subsequent annual wellness visit (AWV) in Medicare patient    2. BMI 24.0-24.9, adult    3. Positive depression screening    4. Facial droop  - Ambulatory referral/consult to Neurology; Future    5. Hyperlipidemia, unspecified hyperlipidemia type    6. Anxiety    7. Hypertension, unspecified type    8. Gastroesophageal reflux disease, unspecified whether esophagitis present    9. History of cerebrovascular accident         Plan:    Referrals:        Advised to call office if does not hear from anyone with referral appt within 2-3 weeks to check on status of referral. Voiced  understanding.      Discussed and provided with a screening schedule and personal prevention plan in accordance with USPSTF age appropriate recommendations and Medicare screening guidelines.   Education, counseling, and referrals were provided as needed.  After Visit Summary printed and given to patient which includes written education and a list of any referrals if indicated.     Education including advanced directives, diet, exercise, falls, and preventive health discussed with patient and patient verbalized understanding.      F/u plan for yearly AWV.    Signature:  Kaya Go FNP-C    I have used clinical judgement based on duration and functional status to consider definite necessity for treatment.

## 2023-05-17 NOTE — PATIENT INSTRUCTIONS
Counseling and Referral of Other Preventative  (Italic type indicates deductible and co-insurance are waived)    Patient Name: Amador Callahan  Today's Date: 5/17/2023    Health Maintenance         Date Due Completion Date    TETANUS VACCINE Never done ---    Shingles Vaccine (1 of 2) Never done ---    COVID-19 Vaccine (4 - Booster for Pfizer series) 05/19/2023 (Originally 12/1/2021) 10/6/2021    HIV Screening 12/15/2027 (Originally 12/11/1975) ---    PROSTATE-SPECIFIC ANTIGEN 09/07/2023 9/7/2022    Lipid Panel 12/08/2023 12/8/2022    High Dose Statin 05/17/2024 5/17/2023    Colorectal Cancer Screening 05/17/2026 5/17/2021          No orders of the defined types were placed in this encounter.

## 2023-06-01 ENCOUNTER — OFFICE VISIT (OUTPATIENT)
Dept: FAMILY MEDICINE | Facility: CLINIC | Age: 63
End: 2023-06-01
Payer: COMMERCIAL

## 2023-06-01 VITALS
BODY MASS INDEX: 24.75 KG/M2 | RESPIRATION RATE: 18 BRPM | WEIGHT: 154 LBS | HEART RATE: 85 BPM | HEIGHT: 66 IN | DIASTOLIC BLOOD PRESSURE: 80 MMHG | OXYGEN SATURATION: 95 % | SYSTOLIC BLOOD PRESSURE: 126 MMHG | TEMPERATURE: 97 F

## 2023-06-01 DIAGNOSIS — K21.9 GASTROESOPHAGEAL REFLUX DISEASE, UNSPECIFIED WHETHER ESOPHAGITIS PRESENT: ICD-10-CM

## 2023-06-01 DIAGNOSIS — F41.9 ANXIETY: ICD-10-CM

## 2023-06-01 DIAGNOSIS — M89.49 OSTEOARTHROSIS MULTIPLE SITES, NOT SPECIFIED AS GENERALIZED: Chronic | ICD-10-CM

## 2023-06-01 DIAGNOSIS — M25.511 CHRONIC RIGHT SHOULDER PAIN: ICD-10-CM

## 2023-06-01 DIAGNOSIS — I10 HYPERTENSION, UNSPECIFIED TYPE: ICD-10-CM

## 2023-06-01 DIAGNOSIS — G89.29 CHRONIC RIGHT SHOULDER PAIN: ICD-10-CM

## 2023-06-01 DIAGNOSIS — E78.5 HYPERLIPIDEMIA, UNSPECIFIED HYPERLIPIDEMIA TYPE: ICD-10-CM

## 2023-06-01 DIAGNOSIS — M54.12 CERVICAL RADICULOPATHY: Chronic | ICD-10-CM

## 2023-06-01 LAB
ALBUMIN SERPL BCP-MCNC: 3.8 G/DL (ref 3.5–5)
ALBUMIN/GLOB SERPL: 0.9 {RATIO}
ALP SERPL-CCNC: 125 U/L (ref 45–115)
ALT SERPL W P-5'-P-CCNC: 52 U/L (ref 16–61)
ANION GAP SERPL CALCULATED.3IONS-SCNC: 6 MMOL/L (ref 7–16)
AST SERPL W P-5'-P-CCNC: 28 U/L (ref 15–37)
BASOPHILS # BLD AUTO: 0.07 K/UL (ref 0–0.2)
BASOPHILS NFR BLD AUTO: 0.7 % (ref 0–1)
BILIRUB SERPL-MCNC: 0.6 MG/DL (ref ?–1.2)
BILIRUB SERPL-MCNC: NEGATIVE MG/DL
BLOOD URINE, POC: NEGATIVE
BUN SERPL-MCNC: 12 MG/DL (ref 7–18)
BUN/CREAT SERPL: 11 (ref 6–20)
CALCIUM SERPL-MCNC: 9 MG/DL (ref 8.5–10.1)
CHLORIDE SERPL-SCNC: 109 MMOL/L (ref 98–107)
CHOLEST SERPL-MCNC: 155 MG/DL (ref 0–200)
CHOLEST/HDLC SERPL: 3.9 {RATIO}
CO2 SERPL-SCNC: 27 MMOL/L (ref 21–32)
COLOR, POC UA: YELLOW
CREAT SERPL-MCNC: 1.08 MG/DL (ref 0.7–1.3)
CREAT UR-MCNC: 193 MG/DL (ref 39–259)
DIFFERENTIAL METHOD BLD: ABNORMAL
EGFR (NO RACE VARIABLE) (RUSH/TITUS): 77 ML/MIN/1.73M2
EOSINOPHIL # BLD AUTO: 0.27 K/UL (ref 0–0.5)
EOSINOPHIL NFR BLD AUTO: 2.8 % (ref 1–4)
ERYTHROCYTE [DISTWIDTH] IN BLOOD BY AUTOMATED COUNT: 12.6 % (ref 11.5–14.5)
GLOBULIN SER-MCNC: 4.2 G/DL (ref 2–4)
GLUCOSE SERPL-MCNC: 63 MG/DL (ref 74–106)
GLUCOSE UR QL STRIP: NEGATIVE
HCT VFR BLD AUTO: 45.2 % (ref 40–54)
HDLC SERPL-MCNC: 40 MG/DL (ref 40–60)
HGB BLD-MCNC: 13.8 G/DL (ref 13.5–18)
IMM GRANULOCYTES # BLD AUTO: 0.03 K/UL (ref 0–0.04)
IMM GRANULOCYTES NFR BLD: 0.3 % (ref 0–0.4)
KETONES UR QL STRIP: NEGATIVE
LDLC SERPL CALC-MCNC: 83 MG/DL
LDLC/HDLC SERPL: 2.1 {RATIO}
LEUKOCYTE ESTERASE URINE, POC: NEGATIVE
LYMPHOCYTES # BLD AUTO: 1.81 K/UL (ref 1–4.8)
LYMPHOCYTES NFR BLD AUTO: 18.7 % (ref 27–41)
MCH RBC QN AUTO: 26.2 PG (ref 27–31)
MCHC RBC AUTO-ENTMCNC: 30.5 G/DL (ref 32–36)
MCV RBC AUTO: 85.9 FL (ref 80–96)
MICROALBUMIN UR-MCNC: 2.5 MG/DL (ref 0–2.8)
MICROALBUMIN/CREAT RATIO PNL UR: 13 MG/G (ref 0–30)
MONOCYTES # BLD AUTO: 0.78 K/UL (ref 0–0.8)
MONOCYTES NFR BLD AUTO: 8 % (ref 2–6)
MPC BLD CALC-MCNC: 10.9 FL (ref 9.4–12.4)
NEUTROPHILS # BLD AUTO: 6.73 K/UL (ref 1.8–7.7)
NEUTROPHILS NFR BLD AUTO: 69.5 % (ref 53–65)
NITRITE, POC UA: NEGATIVE
NONHDLC SERPL-MCNC: 115 MG/DL
NRBC # BLD AUTO: 0 X10E3/UL
NRBC, AUTO (.00): 0 %
PH, POC UA: 6.5
PLATELET # BLD AUTO: 263 K/UL (ref 150–400)
POTASSIUM SERPL-SCNC: 3.8 MMOL/L (ref 3.5–5.1)
PROT SERPL-MCNC: 8 G/DL (ref 6.4–8.2)
PROTEIN, POC: NORMAL
RBC # BLD AUTO: 5.26 M/UL (ref 4.6–6.2)
SODIUM SERPL-SCNC: 138 MMOL/L (ref 136–145)
SPECIFIC GRAVITY, POC UA: 1.02
TRIGL SERPL-MCNC: 160 MG/DL (ref 35–150)
UROBILINOGEN, POC UA: 2
VLDLC SERPL-MCNC: 32 MG/DL
WBC # BLD AUTO: 9.69 K/UL (ref 4.5–11)

## 2023-06-01 PROCEDURE — 85025 COMPLETE CBC W/AUTO DIFF WBC: CPT | Mod: ,,, | Performed by: CLINICAL MEDICAL LABORATORY

## 2023-06-01 PROCEDURE — 3066F PR DOCUMENTATION OF TREATMENT FOR NEPHROPATHY: ICD-10-PCS | Mod: ,,, | Performed by: NURSE PRACTITIONER

## 2023-06-01 PROCEDURE — 82570 MICROALBUMIN / CREATININE RATIO URINE: ICD-10-PCS | Mod: ,,, | Performed by: CLINICAL MEDICAL LABORATORY

## 2023-06-01 PROCEDURE — 1159F MED LIST DOCD IN RCRD: CPT | Mod: ,,, | Performed by: NURSE PRACTITIONER

## 2023-06-01 PROCEDURE — 80053 COMPREHENSIVE METABOLIC PANEL: ICD-10-PCS | Mod: ,,, | Performed by: CLINICAL MEDICAL LABORATORY

## 2023-06-01 PROCEDURE — 3061F PR NEG MICROALBUMINURIA RESULT DOCUMENTED/REVIEW: ICD-10-PCS | Mod: ,,, | Performed by: NURSE PRACTITIONER

## 2023-06-01 PROCEDURE — 3066F NEPHROPATHY DOC TX: CPT | Mod: ,,, | Performed by: NURSE PRACTITIONER

## 2023-06-01 PROCEDURE — 3061F NEG MICROALBUMINURIA REV: CPT | Mod: ,,, | Performed by: NURSE PRACTITIONER

## 2023-06-01 PROCEDURE — 1160F PR REVIEW ALL MEDS BY PRESCRIBER/CLIN PHARMACIST DOCUMENTED: ICD-10-PCS | Mod: ,,, | Performed by: NURSE PRACTITIONER

## 2023-06-01 PROCEDURE — 3074F SYST BP LT 130 MM HG: CPT | Mod: ,,, | Performed by: NURSE PRACTITIONER

## 2023-06-01 PROCEDURE — 80061 LIPID PANEL: CPT | Mod: ,,, | Performed by: CLINICAL MEDICAL LABORATORY

## 2023-06-01 PROCEDURE — 82043 UR ALBUMIN QUANTITATIVE: CPT | Mod: ,,, | Performed by: CLINICAL MEDICAL LABORATORY

## 2023-06-01 PROCEDURE — 3008F PR BODY MASS INDEX (BMI) DOCUMENTED: ICD-10-PCS | Mod: ,,, | Performed by: NURSE PRACTITIONER

## 2023-06-01 PROCEDURE — 81003 URINALYSIS AUTO W/O SCOPE: CPT | Mod: QW,,, | Performed by: NURSE PRACTITIONER

## 2023-06-01 PROCEDURE — 3079F PR MOST RECENT DIASTOLIC BLOOD PRESSURE 80-89 MM HG: ICD-10-PCS | Mod: ,,, | Performed by: NURSE PRACTITIONER

## 2023-06-01 PROCEDURE — 4010F PR ACE/ARB THEARPY RXD/TAKEN: ICD-10-PCS | Mod: ,,, | Performed by: NURSE PRACTITIONER

## 2023-06-01 PROCEDURE — 99214 OFFICE O/P EST MOD 30 MIN: CPT | Mod: ,,, | Performed by: NURSE PRACTITIONER

## 2023-06-01 PROCEDURE — 85025 CBC WITH DIFFERENTIAL: ICD-10-PCS | Mod: ,,, | Performed by: CLINICAL MEDICAL LABORATORY

## 2023-06-01 PROCEDURE — 80061 LIPID PANEL: ICD-10-PCS | Mod: ,,, | Performed by: CLINICAL MEDICAL LABORATORY

## 2023-06-01 PROCEDURE — 99214 PR OFFICE/OUTPT VISIT, EST, LEVL IV, 30-39 MIN: ICD-10-PCS | Mod: ,,, | Performed by: NURSE PRACTITIONER

## 2023-06-01 PROCEDURE — 3079F DIAST BP 80-89 MM HG: CPT | Mod: ,,, | Performed by: NURSE PRACTITIONER

## 2023-06-01 PROCEDURE — 3074F PR MOST RECENT SYSTOLIC BLOOD PRESSURE < 130 MM HG: ICD-10-PCS | Mod: ,,, | Performed by: NURSE PRACTITIONER

## 2023-06-01 PROCEDURE — 3008F BODY MASS INDEX DOCD: CPT | Mod: ,,, | Performed by: NURSE PRACTITIONER

## 2023-06-01 PROCEDURE — 80053 COMPREHEN METABOLIC PANEL: CPT | Mod: ,,, | Performed by: CLINICAL MEDICAL LABORATORY

## 2023-06-01 PROCEDURE — 1159F PR MEDICATION LIST DOCUMENTED IN MEDICAL RECORD: ICD-10-PCS | Mod: ,,, | Performed by: NURSE PRACTITIONER

## 2023-06-01 PROCEDURE — 82570 ASSAY OF URINE CREATININE: CPT | Mod: ,,, | Performed by: CLINICAL MEDICAL LABORATORY

## 2023-06-01 PROCEDURE — 81003 POCT URINALYSIS W/O SCOPE: ICD-10-PCS | Mod: QW,,, | Performed by: NURSE PRACTITIONER

## 2023-06-01 PROCEDURE — 82043 MICROALBUMIN / CREATININE RATIO URINE: ICD-10-PCS | Mod: ,,, | Performed by: CLINICAL MEDICAL LABORATORY

## 2023-06-01 PROCEDURE — 4010F ACE/ARB THERAPY RXD/TAKEN: CPT | Mod: ,,, | Performed by: NURSE PRACTITIONER

## 2023-06-01 PROCEDURE — 1160F RVW MEDS BY RX/DR IN RCRD: CPT | Mod: ,,, | Performed by: NURSE PRACTITIONER

## 2023-06-01 RX ORDER — OMEPRAZOLE 20 MG/1
20 CAPSULE, DELAYED RELEASE ORAL DAILY
Qty: 90 CAPSULE | Refills: 1 | Status: SHIPPED | OUTPATIENT
Start: 2023-06-01 | End: 2023-12-01 | Stop reason: SDUPTHER

## 2023-06-01 RX ORDER — BUSPIRONE HYDROCHLORIDE 10 MG/1
10 TABLET ORAL 2 TIMES DAILY
Qty: 180 TABLET | Refills: 1 | Status: SHIPPED | OUTPATIENT
Start: 2023-06-01 | End: 2023-12-01 | Stop reason: SDUPTHER

## 2023-06-01 RX ORDER — ATORVASTATIN CALCIUM 40 MG/1
40 TABLET, FILM COATED ORAL DAILY
Qty: 90 TABLET | Refills: 1 | Status: SHIPPED | OUTPATIENT
Start: 2023-06-01 | End: 2023-12-01 | Stop reason: SDUPTHER

## 2023-06-01 RX ORDER — LISINOPRIL 10 MG/1
10 TABLET ORAL DAILY
Qty: 90 TABLET | Refills: 1 | Status: SHIPPED | OUTPATIENT
Start: 2023-06-01 | End: 2023-12-01 | Stop reason: SDUPTHER

## 2023-06-01 RX ORDER — CYCLOBENZAPRINE HCL 5 MG
5 TABLET ORAL EVERY 8 HOURS PRN
Qty: 90 TABLET | Refills: 2 | Status: SHIPPED | OUTPATIENT
Start: 2023-06-01 | End: 2023-11-07

## 2023-06-01 NOTE — PROGRESS NOTES
Kaya Go DNP, FNP    [address] [place] [address]     PATIENT NAME: Amador Callahan  : 1960  DATE: 23  MRN: [id number]      Billing Provider: Kaya Go DNP, FNP  Level of Service:   Patient PCP Information       Provider PCP Type    Kaya Go DNP, FNP General            Reason for Visit / Chief Complaint: Follow-up and Medication Refill       Update PCP  Update Chief Complaint         History of Present Illness / Problem Focused Workflow     Amador Callahan presents to the clinic with Follow-up and Medication Refill     Follow-up  Pertinent negatives include no abdominal pain, arthralgias, change in bowel habit, chest pain, chills, congestion, coughing, fatigue, fever, headaches, myalgias, nausea, rash, sore throat, vomiting or weakness.   Medication Refill  Pertinent negatives include no abdominal pain, arthralgias, change in bowel habit, chest pain, chills, congestion, coughing, fatigue, fever, headaches, myalgias, nausea, rash, sore throat, vomiting or weakness.     Review of Systems     Review of Systems   Constitutional:  Negative for activity change, appetite change, chills, fatigue and fever.   HENT:  Negative for nasal congestion, ear pain, hearing loss, postnasal drip and sore throat.    Respiratory:  Negative for cough, chest tightness, shortness of breath and wheezing.    Cardiovascular:  Negative for chest pain, palpitations, leg swelling and claudication.   Gastrointestinal:  Negative for abdominal pain, change in bowel habit, constipation, diarrhea, nausea, vomiting and change in bowel habit.   Genitourinary:  Negative for dysuria.   Musculoskeletal:  Negative for arthralgias, back pain, gait problem and myalgias.   Integumentary:  Negative for rash.   Neurological:  Negative for weakness and headaches.   Psychiatric/Behavioral:  Negative for suicidal ideas. The patient is not nervous/anxious.       Medical / Social / Family  History     Past Medical History:   Diagnosis Date    Adenomatous polyp of descending colon 5/17/2021    Anxiety     Diverticula, colon 5/17/2021    GERD (gastroesophageal reflux disease)     History of cerebrovascular accident     Hyperlipidemia     Hypertension     Screening for malignant neoplasm of colon 5/17/2021       Past Surgical History:   Procedure Laterality Date    EPIDURAL STEROID INJECTION INTO CERVICAL SPINE N/A 12/2/2022    Procedure: Injection-steroid-epidural-cervical, C4/5 EDE;  Surgeon: Angelic Hdz MD;  Location: [facility];  Service: Pain Management;  Laterality: N/A;    heart cath         Social History  Mr. Amador Callahan  reports that he quit smoking about 43 years ago. His smoking use included cigarettes. He started smoking about 45 years ago. He has a 2.00 pack-year smoking history. He has been exposed to tobacco smoke. He has never used smokeless tobacco. He reports that he does not drink alcohol and does not use drugs.    Family History  Mr. Amador Callahan's family history includes Cancer in his father and sister; Heart disease in his mother.    Medications and Allergies     Medications  Outpatient Medications Marked as Taking for the 6/1/23 encounter (Office Visit) with Kaya Go, VERONIKA, FNP   Medication Sig Dispense Refill    gabapentin (NEURONTIN) 100 MG capsule Take 1 capsule (100 mg total) by mouth every 8 (eight) hours. 90 capsule 2    HYDROcodone-acetaminophen (NORCO) 5-325 mg per tablet Take 1 tablet by mouth every 12 (twelve) hours as needed for Pain. 30 tablet 0    HYDROcodone-acetaminophen (NORCO) 5-325 mg per tablet Take 1 tablet by mouth every 12 (twelve) hours as needed for Pain. 30 tablet 0    HYDROcodone-acetaminophen (NORCO) 5-325 mg per tablet Take 1 tablet by mouth every 12 (twelve) hours as needed for Pain. 30 tablet 0    [DISCONTINUED] atorvastatin (LIPITOR) 40 MG tablet Take 1 tablet (40 mg total) by mouth once daily. 90 tablet 1    [DISCONTINUED]  "busPIRone (BUSPAR) 10 MG tablet Take 1 tablet (10 mg total) by mouth 2 (two) times a day. 180 tablet 1    [DISCONTINUED] cyclobenzaprine (FLEXERIL) 5 MG tablet Take 1 tablet (5 mg total) by mouth every 8 (eight) hours as needed for Muscle spasms. 30 tablet 0    [DISCONTINUED] lisinopriL 10 MG tablet Take 1 tablet (10 mg total) by mouth once daily. 90 tablet 1    [DISCONTINUED] omeprazole (PRILOSEC) 20 MG capsule Take 1 capsule (20 mg total) by mouth once daily. 90 capsule 1       Allergies  Review of patient's allergies indicates:  No Known Allergies    Physical Examination     Vitals:    06/01/23 1352   BP: 126/80   BP Location: Right arm   Patient Position: Sitting   BP Method: Large (Automatic)   Pulse: 85   Resp: 18   Temp: 97.4 °F (36.3 °C)   TempSrc: Oral   SpO2: 95%   Weight: 69.9 kg (154 lb)   Height: 5' 6" (1.676 m)     Physical Exam  Vitals and nursing note reviewed.   Constitutional:       General: He is not in acute distress.     Appearance: Normal appearance. He is normal weight. He is not ill-appearing.   HENT:      Mouth/Throat:      Mouth: Mucous membranes are moist.      Comments: Mouth drawn--chronic  Eyes:      Extraocular Movements: Extraocular movements intact.      Pupils: Pupils are equal, round, and reactive to light.   Cardiovascular:      Rate and Rhythm: Normal rate and regular rhythm.      Pulses: Normal pulses.      Heart sounds: Normal heart sounds. No murmur heard.  Pulmonary:      Effort: Pulmonary effort is normal. No respiratory distress.      Breath sounds: Normal breath sounds. No wheezing, rhonchi or rales.   Chest:      Chest wall: No tenderness.   Abdominal:      General: Bowel sounds are normal. There is no distension.      Palpations: Abdomen is soft.      Tenderness: There is no abdominal tenderness. There is no right CVA tenderness, left CVA tenderness, guarding or rebound.   Musculoskeletal:         General: No swelling or tenderness. Normal range of motion.      Cervical " back: Normal range of motion and neck supple.      Right lower leg: No edema.      Left lower leg: No edema.   Skin:     General: Skin is warm and dry.      Findings: No rash.   Neurological:      General: No focal deficit present.      Mental Status: He is alert and oriented to person, place, and time. Mental status is at baseline.   Psychiatric:         Mood and Affect: Mood normal.         Behavior: Behavior normal.         Thought Content: Thought content normal.         Judgment: Judgment normal.        Assessment and Plan (including Health Maintenance)      Problem List  Smart Sets  Document Outside HM   :    Plan:         Health Maintenance Due   Topic Date Due    TETANUS VACCINE  Never done    Shingles Vaccine (1 of 2) Never done    COVID-19 Vaccine (4 - Pfizer series) 12/01/2021       Problem List Items Addressed This Visit          Neuro    Cervical radiculopathy MRI May 2022 (Chronic)       Psychiatric    Anxiety (Chronic)    Relevant Medications    busPIRone (BUSPAR) 10 MG tablet       Cardiac/Vascular    Hyperlipidemia (Chronic)    Relevant Medications    atorvastatin (LIPITOR) 40 MG tablet    Other Relevant Orders    Comprehensive Metabolic Panel (Completed)    Lipid Panel (Completed)    Hypertension (Chronic)    Relevant Medications    lisinopriL 10 MG tablet    Other Relevant Orders    Comprehensive Metabolic Panel (Completed)    CBC Auto Differential (Completed)    Lipid Panel (Completed)    Microalbumin/Creatinine Ratio, Urine (Completed)    POCT URINALYSIS W/O SCOPE (Completed)       GI    GERD (gastroesophageal reflux disease) (Chronic)    Relevant Medications    omeprazole (PRILOSEC) 20 MG capsule       Orthopedic    Chronic right shoulder pain (Chronic)    Relevant Medications    cyclobenzaprine (FLEXERIL) 5 MG tablet    Osteoarthrosis multiple sites, not specified as generalized (Chronic)     Hyperlipidemia, unspecified hyperlipidemia type  -     atorvastatin (LIPITOR) 40 MG tablet; Take 1  tablet (40 mg total) by mouth once daily.  Dispense: 90 tablet; Refill: 1  -     Comprehensive Metabolic Panel; Future; Expected date: 06/01/2023  -     Lipid Panel; Future; Expected date: 06/01/2023    Anxiety  -     busPIRone (BUSPAR) 10 MG tablet; Take 1 tablet (10 mg total) by mouth 2 (two) times a day.  Dispense: 180 tablet; Refill: 1    Chronic right shoulder pain  -     cyclobenzaprine (FLEXERIL) 5 MG tablet; Take 1 tablet (5 mg total) by mouth every 8 (eight) hours as needed for Muscle spasms.  Dispense: 90 tablet; Refill: 2    Osteoarthrosis multiple sites, not specified as generalized    Cervical radiculopathy MRI May 2022    Hypertension, unspecified type  -     lisinopriL 10 MG tablet; Take 1 tablet (10 mg total) by mouth once daily.  Dispense: 90 tablet; Refill: 1  -     Comprehensive Metabolic Panel; Future; Expected date: 06/01/2023  -     CBC Auto Differential; Future; Expected date: 06/01/2023  -     Lipid Panel; Future; Expected date: 06/01/2023  -     Microalbumin/Creatinine Ratio, Urine  -     POCT URINALYSIS W/O SCOPE    Gastroesophageal reflux disease, unspecified whether esophagitis present  -     omeprazole (PRILOSEC) 20 MG capsule; Take 1 capsule (20 mg total) by mouth once daily.  Dispense: 90 capsule; Refill: 1       Health Maintenance Topics with due status: Not Due       Topic Last Completion Date    Colorectal Cancer Screening 05/17/2021    PROSTATE-SPECIFIC ANTIGEN 09/07/2022    High Dose Statin 06/01/2023    Lipid Panel 06/01/2023          Future Appointments   Date Time Provider Department Center   6/13/2023 10:45 AM [facility]   12/1/2023  1:30 PM Kaya Go DNP, [facility] [place]   5/22/2024  2:00 PM AWV NURSE, [facility] [place]        Follow up in about 6 months (around 12/1/2023).     Signature:  Kaya Go DNP, [facility]  [address] [place]  [address]  Phone #: [phone]  Fax #: [phone]    Date of encounter: 6/1/23    Patient Instructions   Continue current medication regimen. Will call pt with lab results. Follow up in 6 months for chronic medical problems or sooner if needed.

## 2023-06-09 DIAGNOSIS — Z71.89 COMPLEX CARE COORDINATION: ICD-10-CM

## 2023-06-11 NOTE — PATIENT INSTRUCTIONS
Continue current medication regimen. Will call pt with lab results. Follow up in 6 months for chronic medical problems or sooner if needed.

## 2023-06-12 NOTE — PROGRESS NOTES
Subjective:         Patient ID: Amador Callahan is a 62 y.o. male.    Chief Complaint: Shoulder Pain (Right shoulder)        Pain  This is a chronic problem. The current episode started more than 1 year ago. The problem occurs daily. The problem has been unchanged. Associated symptoms include arthralgias, chest pain and neck pain. Pertinent negatives include no anorexia, chills, coughing, diaphoresis, fever, sore throat, swollen glands, vertigo or vomiting.   Review of Systems   Constitutional:  Negative for activity change, appetite change, chills, diaphoresis, fever and unexpected weight change.   HENT:  Negative for drooling, ear discharge, ear pain, facial swelling, nosebleeds, sore throat, trouble swallowing, voice change and goiter.    Eyes:  Negative for photophobia, pain, discharge, redness and visual disturbance.   Respiratory:  Negative for apnea, cough, choking, chest tightness, shortness of breath, wheezing and stridor.    Cardiovascular:  Positive for chest pain. Negative for palpitations and leg swelling.   Gastrointestinal:  Negative for abdominal distention, anorexia, diarrhea, rectal pain, vomiting and fecal incontinence.   Endocrine: Negative for cold intolerance, heat intolerance, polydipsia, polyphagia and polyuria.   Genitourinary:  Negative for bladder incontinence, dysuria, flank pain and frequency.   Musculoskeletal:  Positive for arthralgias, back pain, leg pain and neck pain.   Integumentary:  Negative for color change and pallor.   Neurological:  Negative for dizziness, vertigo, seizures, syncope, facial asymmetry, speech difficulty, light-headedness, coordination difficulties, memory loss and coordination difficulties.   Hematological:  Negative for adenopathy. Does not bruise/bleed easily.   Psychiatric/Behavioral:  Negative for agitation, behavioral problems, confusion, decreased concentration, dysphoric mood, hallucinations, self-injury and suicidal ideas. The patient is not  "nervous/anxious and is not hyperactive.          Past Medical History:   Diagnosis Date    Adenomatous polyp of descending colon 2021    Anxiety     Diverticula, colon 2021    GERD (gastroesophageal reflux disease)     History of cerebrovascular accident     Hyperlipidemia     Hypertension     Screening for malignant neoplasm of colon 2021     Past Surgical History:   Procedure Laterality Date    EPIDURAL STEROID INJECTION INTO CERVICAL SPINE N/A 2022    Procedure: Injection-steroid-epidural-cervical, C4/5 EDE;  Surgeon: Angelic Hdz MD;  Location: [facility];  Service: Pain Management;  Laterality: N/A;    heart cath       Social History     Socioeconomic History    Marital status: Single   Tobacco Use    Smoking status: Former     Packs/day: 1.00     Years: 2.00     Pack years: 2.00     Types: Cigarettes     Start date:      Quit date:      Years since quittin.4     Passive exposure: Past    Smokeless tobacco: Never   Substance and Sexual Activity    Alcohol use: Never    Drug use: Never    Sexual activity: Not Currently     Family History   Problem Relation Age of Onset    Heart disease Mother     Cancer Father     Cancer Sister      Review of patient's allergies indicates:  No Known Allergies     Objective:  Vitals:    23 0953   BP: (!) 149/56   Pulse: 86   Resp: 18   Weight: 70.3 kg (155 lb)   Height: 5' 6" (1.676 m)   PainSc:   5           Physical Exam  Vitals and nursing note reviewed. Exam conducted with a chaperone present.   Constitutional:       General: He is awake.      Appearance: Normal appearance. He is not ill-appearing, toxic-appearing or diaphoretic.   HENT:      Head: Normocephalic and atraumatic.      Nose: Nose normal.      Mouth/Throat:      Mouth: Mucous membranes are moist.      Pharynx: Oropharynx is clear.   Eyes:      Conjunctiva/sclera: Conjunctivae normal.      Pupils: Pupils are equal, round, and reactive to light.   Cardiovascular: "      Rate and Rhythm: Normal rate.   Pulmonary:      Effort: Pulmonary effort is normal. No respiratory distress.   Abdominal:      Palpations: Abdomen is soft.      Tenderness: There is no guarding.   Musculoskeletal:         General: Normal range of motion.      Cervical back: Normal range of motion and neck supple. No rigidity.   Skin:     General: Skin is warm and dry.      Coloration: Skin is not jaundiced or pale.   Neurological:      General: No focal deficit present.      Mental Status: He is alert and oriented to person, place, and time. Mental status is at baseline.      Cranial Nerves: No cranial nerve deficit (II-XII).   Psychiatric:         Mood and Affect: Mood normal.         Behavior: Behavior normal. Behavior is cooperative.         Thought Content: Thought content normal.         FL Fluoro for Pain Management  See OP Notes for results.     IMPRESSION: See OP Notes for results.     This procedure was auto-finalized by: Virtual Radiologist         Office Visit on 06/01/2023   Component Date Value Ref Range Status    Creatinine, Urine 06/01/2023 193  39 - 259 mg/dL Final    Microalbumin 06/01/2023 2.5  0.0 - 2.8 mg/dL Final    Microalbumin/Creatinine Ratio 06/01/2023 13.0  0.0 - 30.0 mg/g Final    Color, UA 06/01/2023 Yellow   Final    Spec Grav UA 06/01/2023 1.020   Final    pH, UA 06/01/2023 6.5   Final    WBC, UA 06/01/2023 negative   Final    Nitrite, UA 06/01/2023 negative   Final    Protein, POC 06/01/2023 30mg   Final    Glucose, UA 06/01/2023 negative   Final    Ketones, UA 06/01/2023 negative   Final    Bilirubin, POC 06/01/2023 negative   Final    Urobilinogen, UA 06/01/2023 2.0   Final    Blood, UA 06/01/2023 negative   Final    Sodium 06/01/2023 138  136 - 145 mmol/L Final    Potassium 06/01/2023 3.8  3.5 - 5.1 mmol/L Final    Chloride 06/01/2023 109 (H)  98 - 107 mmol/L Final    CO2 06/01/2023 27  21 - 32 mmol/L Final    Anion Gap 06/01/2023 6 (L)  7 - 16 mmol/L Final    Glucose  06/01/2023 63 (L)  74 - 106 mg/dL Final    BUN 06/01/2023 12  7 - 18 mg/dL Final    Creatinine 06/01/2023 1.08  0.70 - 1.30 mg/dL Final    BUN/Creatinine Ratio 06/01/2023 11  6 - 20 Final    Calcium 06/01/2023 9.0  8.5 - 10.1 mg/dL Final    Total Protein 06/01/2023 8.0  6.4 - 8.2 g/dL Final    Albumin 06/01/2023 3.8  3.5 - 5.0 g/dL Final    Globulin 06/01/2023 4.2 (H)  2.0 - 4.0 g/dL Final    A/G Ratio 06/01/2023 0.9   Final    Bilirubin, Total 06/01/2023 0.6  >0.0 - 1.2 mg/dL Final    Alk Phos 06/01/2023 125 (H)  45 - 115 U/L Final    ALT 06/01/2023 52  16 - 61 U/L Final    AST 06/01/2023 28  15 - 37 U/L Final    eGFR 06/01/2023 77  >=60 mL/min/1.73m2 Final    Triglycerides 06/01/2023 160 (H)  35 - 150 mg/dL Final    Cholesterol 06/01/2023 155  0 - 200 mg/dL Final    HDL Cholesterol 06/01/2023 40  40 - 60 mg/dL Final    Cholesterol/HDL Ratio (Risk Factor) 06/01/2023 3.9   Final    Non-HDL 06/01/2023 115  mg/dL Final    LDL Calculated 06/01/2023 83  mg/dL Final    LDL/HDL 06/01/2023 2.1   Final    VLDL 06/01/2023 32  mg/dL Final    WBC 06/01/2023 9.69  4.50 - 11.00 K/uL Final    RBC 06/01/2023 5.26  4.60 - 6.20 M/uL Final    Hemoglobin 06/01/2023 13.8  13.5 - 18.0 g/dL Final    Hematocrit 06/01/2023 45.2  40.0 - 54.0 % Final    MCV 06/01/2023 85.9  80.0 - 96.0 fL Final    MCH 06/01/2023 26.2 (L)  27.0 - 31.0 pg Final    MCHC 06/01/2023 30.5 (L)  32.0 - 36.0 g/dL Final    RDW 06/01/2023 12.6  11.5 - 14.5 % Final    Platelet Count 06/01/2023 263  150 - 400 K/uL Final    MPV 06/01/2023 10.9  9.4 - 12.4 fL Final    Neutrophils % 06/01/2023 69.5 (H)  53.0 - 65.0 % Final    Lymphocytes % 06/01/2023 18.7 (L)  27.0 - 41.0 % Final    Monocytes % 06/01/2023 8.0 (H)  2.0 - 6.0 % Final    Eosinophils % 06/01/2023 2.8  1.0 - 4.0 % Final    Basophils % 06/01/2023 0.7  0.0 - 1.0 % Final    Immature Granulocytes % 06/01/2023 0.3  0.0 - 0.4 % Final    nRBC, Auto 06/01/2023 0.0  <=0.0 % Final    Neutrophils, Abs 06/01/2023 6.73  1.80  - 7.70 K/uL Final    Lymphocytes, Absolute 06/01/2023 1.81  1.00 - 4.80 K/uL Final    Monocytes, Absolute 06/01/2023 0.78  0.00 - 0.80 K/uL Final    Eosinophils, Absolute 06/01/2023 0.27  0.00 - 0.50 K/uL Final    Basophils, Absolute 06/01/2023 0.07  0.00 - 0.20 K/uL Final    Immature Granulocytes, Absolute 06/01/2023 0.03  0.00 - 0.04 K/uL Final    nRBC, Absolute 06/01/2023 0.00  <=0.00 x10e3/uL Final    Diff Type 06/01/2023 Auto   Final   Office Visit on 03/16/2023   Component Date Value Ref Range Status    POC Amphetamines 03/16/2023 Negative  Negative, Inconclusive Final    POC Barbiturates 03/16/2023 Negative  Negative, Inconclusive Final    POC Benzodiazepines 03/16/2023 Negative  Negative, Inconclusive Final    POC Cocaine 03/16/2023 Negative  Negative, Inconclusive Final    POC THC 03/16/2023 Negative  Negative, Inconclusive Final    POC Methadone 03/16/2023 Negative  Negative, Inconclusive Final    POC Methamphetamine 03/16/2023 Negative  Negative, Inconclusive Final    POC Opiates 03/16/2023 Negative  Negative, Inconclusive Final    POC Oxycodone 03/16/2023 Negative  Negative, Inconclusive Final    POC Phencyclidine 03/16/2023 Negative  Negative, Inconclusive Final    POC Methylenedioxymethamphetamine * 03/16/2023 Negative  Negative, Inconclusive Final    POC Tricyclic Antidepressants 03/16/2023 Negative  Negative, Inconclusive Final    POC Buprenorphine 03/16/2023 Negative   Final     Acceptable 03/16/2023 Yes   Final    POC Temperature (Urine) 03/16/2023 90   Final    pH, UA 03/16/2023 6.0  5.0 to 8.0 pH Units Final    Creatinine, Urine 03/16/2023 390 (H)  39 - 259 mg/dL Final    6-Acetylmorphine 03/16/2023 Negative  10 ng/mL Final    7-Aminoclonazepam 03/16/2023 Negative  Negative 25 ng/mL Final    a-Hydroxyalprazolam 03/16/2023 Negative  Negative 25 ng/mL Final    Acetyl Fentanyl 03/16/2023 Negative  2.5 ng/mL Final    Acetyl Norfentanyl Oxalate 03/16/2023 Negative  5 ng/mL Final     Benzoylecgonine 03/16/2023 Negative  100 ng/mL Final    Buprenorphine 03/16/2023 Negative  25 ng/mL Final    Codeine 03/16/2023 Negative  25 ng/mL Final    EDDP 03/16/2023 Negative  25 ng/mL Final    Fentanyl 03/16/2023 Negative  2.5 ng/mL Final    Hydrocodone 03/16/2023 Negative  25 ng/mL Final    Hydromorphone 03/16/2023 Negative  25 ng/mL Final    Lorazepam 03/16/2023 Negative  25 ng/mL Final    Morphine 03/16/2023 Negative  25 ng/mL Final    Norbuprenorphine 03/16/2023 Negative  25 ng/mL Final    Nordiazepam 03/16/2023 Negative  25 ng/mL Final    Norfentanyl Oxalate 03/16/2023 Negative  5 ng/mL Final    Norhydrocodone 03/16/2023 Negative  50 ng/mL Final    Noroxycodone HCL 03/16/2023 Negative  50 ng/mL Final    Oxazepam 03/16/2023 Negative  25 ng/mL Final    Oxymorphone 03/16/2023 Negative  25 ng/mL Final    Tapentadol 03/16/2023 Negative  25 ng/mL Final    Temazepam 03/16/2023 Negative  25 ng/mL Final    THC-COOH 03/16/2023 Negative  25 ng/mL Final    Tramadol 03/16/2023 Negative  100 ng/mL Final    Amphetamine, Urine 03/16/2023 Negative  Negative Final    Methamphetamines, Urine 03/16/2023 Negative  Negative Final    Methadone, Urine 03/16/2023 Negative  Negative 25 ng/mL Final    Oxycodone, Urine 03/16/2023 Negative  Negative 25 ng/mL Final    Specific Gravity, UA 03/16/2023 1.034 (H)  <=1.030 Final         No orders of the defined types were placed in this encounter.          Requested Prescriptions     Signed Prescriptions Disp Refills    gabapentin (NEURONTIN) 100 MG capsule 90 capsule 2     Sig: Take 1 capsule (100 mg total) by mouth every 8 (eight) hours.    HYDROcodone-acetaminophen (NORCO) 5-325 mg per tablet 30 tablet 0     Sig: Take 1 tablet by mouth every 12 (twelve) hours as needed for Pain.    HYDROcodone-acetaminophen (NORCO) 5-325 mg per tablet 30 tablet 0     Sig: Take 1 tablet by mouth every 12 (twelve) hours as needed for Pain.    HYDROcodone-acetaminophen (NORCO) 5-325 mg per tablet 30  tablet 0     Sig: Take 1 tablet by mouth every 12 (twelve) hours as needed for Pain.       Assessment:     1. Cervical radiculopathy MRI May 2022    2. Chronic right shoulder pain    3. Osteoarthrosis multiple sites, not specified as generalized         A's of Opioid Risk Assessment  Activity:Patient can perform ADL.   Analgesia:Patients pain is partially controlled by current medication. Patient has tried OTC medications such as Tylenol and Ibuprofen with out relief.   Adverse Effects: Patient denies constipation or sedation.  Aberrant Behavior:  reviewed with no aberrant drug seeking/taking behavior.  Overdose reversal drug naloxone discussed    Drug screen reviewed    MRI cervical spine [facility] May 2022 which shows severe right neuroforaminal narrowing central canal narrowing due to herniated discs, severe right neural foraminal narrowing C5/6 multiple level degenerative change      Plan:    Narcan December 2022    Bell's palsy left face    Follows orthopedics [facility]    June 20, 2023 definitive drug screen from office visit June 14, 2023 returned negative no opiates noted    Patient's last refill date May 15, 2023 Norco 5, 30 tablets    Will call patient for pill count, drug screen        He is doing quite well at this time with current medication     He would like to continue with conservative management for now     Continue home exercise program as directed    Continue Norco as directed     Follow-up 3 months    Dr. Hdz December 2023    Bring original prescription medication bottles/container/box with labels to each visit

## 2023-06-13 ENCOUNTER — OFFICE VISIT (OUTPATIENT)
Dept: PAIN MEDICINE | Facility: CLINIC | Age: 63
End: 2023-06-13
Payer: COMMERCIAL

## 2023-06-13 VITALS
SYSTOLIC BLOOD PRESSURE: 149 MMHG | HEART RATE: 86 BPM | DIASTOLIC BLOOD PRESSURE: 56 MMHG | WEIGHT: 155 LBS | RESPIRATION RATE: 18 BRPM | BODY MASS INDEX: 24.91 KG/M2 | HEIGHT: 66 IN

## 2023-06-13 DIAGNOSIS — M54.12 CERVICAL RADICULOPATHY: Primary | Chronic | ICD-10-CM

## 2023-06-13 DIAGNOSIS — M25.511 CHRONIC RIGHT SHOULDER PAIN: Chronic | ICD-10-CM

## 2023-06-13 DIAGNOSIS — Z79.899 ENCOUNTER FOR LONG-TERM (CURRENT) USE OF OTHER MEDICATIONS: ICD-10-CM

## 2023-06-13 DIAGNOSIS — M89.49 OSTEOARTHROSIS MULTIPLE SITES, NOT SPECIFIED AS GENERALIZED: Chronic | ICD-10-CM

## 2023-06-13 DIAGNOSIS — G89.29 CHRONIC RIGHT SHOULDER PAIN: Chronic | ICD-10-CM

## 2023-06-13 PROCEDURE — 99214 PR OFFICE/OUTPT VISIT, EST, LEVL IV, 30-39 MIN: ICD-10-PCS | Mod: S$PBB,,, | Performed by: PHYSICIAN ASSISTANT

## 2023-06-13 PROCEDURE — 3066F NEPHROPATHY DOC TX: CPT | Mod: CPTII,,, | Performed by: PHYSICIAN ASSISTANT

## 2023-06-13 PROCEDURE — G0481 DRUG TEST DEF 8-14 CLASSES: HCPCS | Performed by: PHYSICIAN ASSISTANT

## 2023-06-13 PROCEDURE — 3061F NEG MICROALBUMINURIA REV: CPT | Mod: CPTII,,, | Performed by: PHYSICIAN ASSISTANT

## 2023-06-13 PROCEDURE — 3008F PR BODY MASS INDEX (BMI) DOCUMENTED: ICD-10-PCS | Mod: CPTII,,, | Performed by: PHYSICIAN ASSISTANT

## 2023-06-13 PROCEDURE — 3008F BODY MASS INDEX DOCD: CPT | Mod: CPTII,,, | Performed by: PHYSICIAN ASSISTANT

## 2023-06-13 PROCEDURE — 3061F PR NEG MICROALBUMINURIA RESULT DOCUMENTED/REVIEW: ICD-10-PCS | Mod: CPTII,,, | Performed by: PHYSICIAN ASSISTANT

## 2023-06-13 PROCEDURE — 1159F PR MEDICATION LIST DOCUMENTED IN MEDICAL RECORD: ICD-10-PCS | Mod: CPTII,,, | Performed by: PHYSICIAN ASSISTANT

## 2023-06-13 PROCEDURE — 4010F ACE/ARB THERAPY RXD/TAKEN: CPT | Mod: CPTII,,, | Performed by: PHYSICIAN ASSISTANT

## 2023-06-13 PROCEDURE — 99214 OFFICE O/P EST MOD 30 MIN: CPT | Mod: S$PBB,,, | Performed by: PHYSICIAN ASSISTANT

## 2023-06-13 PROCEDURE — 3066F PR DOCUMENTATION OF TREATMENT FOR NEPHROPATHY: ICD-10-PCS | Mod: CPTII,,, | Performed by: PHYSICIAN ASSISTANT

## 2023-06-13 PROCEDURE — 4010F PR ACE/ARB THEARPY RXD/TAKEN: ICD-10-PCS | Mod: CPTII,,, | Performed by: PHYSICIAN ASSISTANT

## 2023-06-13 PROCEDURE — 3077F SYST BP >= 140 MM HG: CPT | Mod: CPTII,,, | Performed by: PHYSICIAN ASSISTANT

## 2023-06-13 PROCEDURE — 3077F PR MOST RECENT SYSTOLIC BLOOD PRESSURE >= 140 MM HG: ICD-10-PCS | Mod: CPTII,,, | Performed by: PHYSICIAN ASSISTANT

## 2023-06-13 PROCEDURE — 3078F PR MOST RECENT DIASTOLIC BLOOD PRESSURE < 80 MM HG: ICD-10-PCS | Mod: CPTII,,, | Performed by: PHYSICIAN ASSISTANT

## 2023-06-13 PROCEDURE — 1159F MED LIST DOCD IN RCRD: CPT | Mod: CPTII,,, | Performed by: PHYSICIAN ASSISTANT

## 2023-06-13 PROCEDURE — 3078F DIAST BP <80 MM HG: CPT | Mod: CPTII,,, | Performed by: PHYSICIAN ASSISTANT

## 2023-06-13 PROCEDURE — 99213 OFFICE O/P EST LOW 20 MIN: CPT | Mod: PBBFAC | Performed by: PHYSICIAN ASSISTANT

## 2023-06-13 RX ORDER — HYDROCODONE BITARTRATE AND ACETAMINOPHEN 5; 325 MG/1; MG/1
1 TABLET ORAL EVERY 12 HOURS PRN
Qty: 30 TABLET | Refills: 0 | Status: SHIPPED | OUTPATIENT
Start: 2023-07-14 | End: 2023-07-05

## 2023-06-13 RX ORDER — HYDROCODONE BITARTRATE AND ACETAMINOPHEN 5; 325 MG/1; MG/1
1 TABLET ORAL EVERY 12 HOURS PRN
Qty: 30 TABLET | Refills: 0 | Status: SHIPPED | OUTPATIENT
Start: 2023-08-13 | End: 2023-07-05

## 2023-06-13 RX ORDER — GABAPENTIN 100 MG/1
100 CAPSULE ORAL EVERY 8 HOURS
Qty: 90 CAPSULE | Refills: 2 | Status: SHIPPED | OUTPATIENT
Start: 2023-06-13 | End: 2023-11-07

## 2023-06-13 RX ORDER — HYDROCODONE BITARTRATE AND ACETAMINOPHEN 5; 325 MG/1; MG/1
1 TABLET ORAL EVERY 12 HOURS PRN
Qty: 30 TABLET | Refills: 0 | Status: SHIPPED | OUTPATIENT
Start: 2023-06-14 | End: 2023-11-07

## 2023-06-20 LAB — BEAKER SEE SCANNED REPORT: NORMAL

## 2023-07-05 ENCOUNTER — TELEPHONE (OUTPATIENT)
Dept: PAIN MEDICINE | Facility: CLINIC | Age: 63
End: 2023-07-05
Payer: COMMERCIAL

## 2023-07-05 NOTE — TELEPHONE ENCOUNTER
JONO ARREDONDO   07/05/2023   9:27 AM   Spoke with patient informed to come in tomorrow for pill count by 9:30 voiced understanding.   JONO ARREDONDO   07/06/2023   11:08 AM   No show for pill count and drug screen, narcotics discontinued.

## 2023-09-30 ENCOUNTER — EXTERNAL CHRONIC CARE MANAGEMENT (OUTPATIENT)
Dept: FAMILY MEDICINE | Facility: CLINIC | Age: 63
End: 2023-09-30
Payer: COMMERCIAL

## 2023-09-30 PROCEDURE — G0511 PR CHRONIC CARE MGMT, RHC OR FQHC ONLY, 20 MINS OR MORE: ICD-10-PCS | Mod: ,,, | Performed by: NURSE PRACTITIONER

## 2023-09-30 PROCEDURE — G0511 CCM/BHI BY RHC/FQHC 20MIN MO: HCPCS | Mod: ,,, | Performed by: NURSE PRACTITIONER

## 2023-10-31 ENCOUNTER — EXTERNAL CHRONIC CARE MANAGEMENT (OUTPATIENT)
Dept: FAMILY MEDICINE | Facility: CLINIC | Age: 63
End: 2023-10-31
Payer: COMMERCIAL

## 2023-10-31 PROCEDURE — G0511 CCM/BHI BY RHC/FQHC 20MIN MO: HCPCS | Mod: ,,, | Performed by: NURSE PRACTITIONER

## 2023-10-31 PROCEDURE — G0511 PR CHRONIC CARE MGMT, RHC OR FQHC ONLY, 20 MINS OR MORE: ICD-10-PCS | Mod: ,,, | Performed by: NURSE PRACTITIONER

## 2023-11-07 ENCOUNTER — OFFICE VISIT (OUTPATIENT)
Dept: FAMILY MEDICINE | Facility: CLINIC | Age: 63
End: 2023-11-07
Payer: COMMERCIAL

## 2023-11-07 VITALS
HEIGHT: 66 IN | WEIGHT: 152.19 LBS | HEART RATE: 85 BPM | TEMPERATURE: 98 F | SYSTOLIC BLOOD PRESSURE: 133 MMHG | BODY MASS INDEX: 24.46 KG/M2 | DIASTOLIC BLOOD PRESSURE: 74 MMHG | RESPIRATION RATE: 18 BRPM | OXYGEN SATURATION: 97 %

## 2023-11-07 DIAGNOSIS — Z23 ENCOUNTER FOR VACCINATION: ICD-10-CM

## 2023-11-07 DIAGNOSIS — F41.9 ANXIETY: Primary | ICD-10-CM

## 2023-11-07 PROCEDURE — 4010F PR ACE/ARB THEARPY RXD/TAKEN: ICD-10-PCS | Mod: ,,, | Performed by: NURSE PRACTITIONER

## 2023-11-07 PROCEDURE — 3075F PR MOST RECENT SYSTOLIC BLOOD PRESS GE 130-139MM HG: ICD-10-PCS | Mod: ,,, | Performed by: NURSE PRACTITIONER

## 2023-11-07 PROCEDURE — 3061F PR NEG MICROALBUMINURIA RESULT DOCUMENTED/REVIEW: ICD-10-PCS | Mod: ,,, | Performed by: NURSE PRACTITIONER

## 2023-11-07 PROCEDURE — 3066F PR DOCUMENTATION OF TREATMENT FOR NEPHROPATHY: ICD-10-PCS | Mod: ,,, | Performed by: NURSE PRACTITIONER

## 2023-11-07 PROCEDURE — G0008 ADMIN INFLUENZA VIRUS VAC: HCPCS | Mod: ,,, | Performed by: NURSE PRACTITIONER

## 2023-11-07 PROCEDURE — 1160F RVW MEDS BY RX/DR IN RCRD: CPT | Mod: ,,, | Performed by: NURSE PRACTITIONER

## 2023-11-07 PROCEDURE — 3078F DIAST BP <80 MM HG: CPT | Mod: ,,, | Performed by: NURSE PRACTITIONER

## 2023-11-07 PROCEDURE — 99214 PR OFFICE/OUTPT VISIT, EST, LEVL IV, 30-39 MIN: ICD-10-PCS | Mod: ,,, | Performed by: NURSE PRACTITIONER

## 2023-11-07 PROCEDURE — G0008 FLU VACCINE (QUAD) GREATER THAN OR EQUAL TO 3YO PRESERVATIVE FREE IM: ICD-10-PCS | Mod: ,,, | Performed by: NURSE PRACTITIONER

## 2023-11-07 PROCEDURE — 3008F BODY MASS INDEX DOCD: CPT | Mod: ,,, | Performed by: NURSE PRACTITIONER

## 2023-11-07 PROCEDURE — 99214 OFFICE O/P EST MOD 30 MIN: CPT | Mod: ,,, | Performed by: NURSE PRACTITIONER

## 2023-11-07 PROCEDURE — 90686 FLU VACCINE (QUAD) GREATER THAN OR EQUAL TO 3YO PRESERVATIVE FREE IM: ICD-10-PCS | Mod: ,,, | Performed by: NURSE PRACTITIONER

## 2023-11-07 PROCEDURE — 1159F PR MEDICATION LIST DOCUMENTED IN MEDICAL RECORD: ICD-10-PCS | Mod: ,,, | Performed by: NURSE PRACTITIONER

## 2023-11-07 PROCEDURE — 1159F MED LIST DOCD IN RCRD: CPT | Mod: ,,, | Performed by: NURSE PRACTITIONER

## 2023-11-07 PROCEDURE — 3066F NEPHROPATHY DOC TX: CPT | Mod: ,,, | Performed by: NURSE PRACTITIONER

## 2023-11-07 PROCEDURE — 1160F PR REVIEW ALL MEDS BY PRESCRIBER/CLIN PHARMACIST DOCUMENTED: ICD-10-PCS | Mod: ,,, | Performed by: NURSE PRACTITIONER

## 2023-11-07 PROCEDURE — 3008F PR BODY MASS INDEX (BMI) DOCUMENTED: ICD-10-PCS | Mod: ,,, | Performed by: NURSE PRACTITIONER

## 2023-11-07 PROCEDURE — 3075F SYST BP GE 130 - 139MM HG: CPT | Mod: ,,, | Performed by: NURSE PRACTITIONER

## 2023-11-07 PROCEDURE — 4010F ACE/ARB THERAPY RXD/TAKEN: CPT | Mod: ,,, | Performed by: NURSE PRACTITIONER

## 2023-11-07 PROCEDURE — 3061F NEG MICROALBUMINURIA REV: CPT | Mod: ,,, | Performed by: NURSE PRACTITIONER

## 2023-11-07 PROCEDURE — 3078F PR MOST RECENT DIASTOLIC BLOOD PRESSURE < 80 MM HG: ICD-10-PCS | Mod: ,,, | Performed by: NURSE PRACTITIONER

## 2023-11-07 PROCEDURE — 90686 IIV4 VACC NO PRSV 0.5 ML IM: CPT | Mod: ,,, | Performed by: NURSE PRACTITIONER

## 2023-11-07 NOTE — PROGRESS NOTES
Kaya Go DNP, FNP    [address] [place] [address]     PATIENT NAME: Amador Callahan  : 1960  DATE: 23  MRN: [id number]      Billing Provider: Kaya Go DNP, FNP  Level of Service:   Patient PCP Information       Provider PCP Type    Kaya Go DNP, FNP General            Reason for Visit / Chief Complaint: Anxiety (Patient has been going through a difficult time, and it has caused him to have anxiety, headaches, and pain in his chest.)       Update PCP  Update Chief Complaint         History of Present Illness / Problem Focused Workflow     Amador Callahan presents to the clinic with Anxiety (Patient has been going through a difficult time, and it has caused him to have anxiety, headaches, and pain in his chest.)   Pt has been trying to find a home after being court ordered to leave his new home. Pt has been trying to find nursing home placement.     Pt c/o chest discomfort and shortness of breath when he lies down at night.   Medication Refill  Pertinent negatives include no abdominal pain, arthralgias, change in bowel habit, chest pain, chills, congestion, coughing, fatigue, fever, headaches, myalgias, nausea, rash, sore throat, vomiting or weakness.   Anxiety  Patient reports no chest pain, nausea, nervous/anxious behavior, palpitations, shortness of breath or suicidal ideas.         Review of Systems     Review of Systems   Constitutional:  Negative for activity change, appetite change, chills, fatigue and fever.   HENT:  Negative for nasal congestion, ear pain, hearing loss, postnasal drip and sore throat.    Respiratory:  Negative for cough, chest tightness, shortness of breath and wheezing.    Cardiovascular:  Negative for chest pain, palpitations, leg swelling and claudication.   Gastrointestinal:  Negative for abdominal pain, change in bowel habit, constipation, diarrhea, nausea and vomiting.   Genitourinary:  Negative for dysuria.    Musculoskeletal:  Negative for arthralgias, back pain, gait problem and myalgias.   Integumentary:  Negative for rash.   Neurological:  Negative for weakness and headaches.   Psychiatric/Behavioral:  Negative for suicidal ideas. The patient is not nervous/anxious.         Medical / Social / Family History     Past Medical History:   Diagnosis Date    Adenomatous polyp of descending colon 5/17/2021    Anxiety     Diverticula, colon 5/17/2021    GERD (gastroesophageal reflux disease)     History of cerebrovascular accident     Hyperlipidemia     Hypertension     Screening for malignant neoplasm of colon 5/17/2021       Past Surgical History:   Procedure Laterality Date    EPIDURAL STEROID INJECTION INTO CERVICAL SPINE N/A 12/2/2022    Procedure: Injection-steroid-epidural-cervical, C4/5 EDE;  Surgeon: Angelic Hdz MD;  Location: [facility];  Service: Pain Management;  Laterality: N/A;    heart cath         Social History  Mr. Amador Callahan  reports that he quit smoking about 43 years ago. His smoking use included cigarettes. He started smoking about 45 years ago. He has a 2.0 pack-year smoking history. He has been exposed to tobacco smoke. He has never used smokeless tobacco. He reports that he does not drink alcohol and does not use drugs.    Family History  Mr. Amador Callahan's family history includes Cancer in his father and sister; Heart disease in his mother.    Medications and Allergies     Medications  Outpatient Medications Marked as Taking for the 11/7/23 encounter (Office Visit) with Kaya Go, VERONIKA, FNP   Medication Sig Dispense Refill    atorvastatin (LIPITOR) 40 MG tablet Take 1 tablet (40 mg total) by mouth once daily. 90 tablet 1    busPIRone (BUSPAR) 10 MG tablet Take 1 tablet (10 mg total) by mouth 2 (two) times a day. 180 tablet 1    lisinopriL 10 MG tablet Take 1 tablet (10 mg total) by mouth once daily. 90 tablet 1    omeprazole (PRILOSEC) 20 MG capsule Take 1 capsule (20 mg  "total) by mouth once daily. 90 capsule 1       Allergies  Review of patient's allergies indicates:  No Known Allergies    Physical Examination     Vitals:    11/07/23 1359   BP: 133/74   BP Location: Right arm   Patient Position: Sitting   BP Method: Large (Automatic)   Pulse: 85   Resp: 18   Temp: 97.6 °F (36.4 °C)   TempSrc: Oral   SpO2: 97%   Weight: 69 kg (152 lb 3.2 oz)   Height: 5' 6" (1.676 m)     Physical Exam  Vitals and nursing note reviewed.   Constitutional:       General: He is not in acute distress.     Appearance: Normal appearance. He is not ill-appearing.   Eyes:      Extraocular Movements: Extraocular movements intact.      Pupils: Pupils are equal, round, and reactive to light.   Cardiovascular:      Rate and Rhythm: Normal rate and regular rhythm.      Heart sounds: Normal heart sounds.   Pulmonary:      Effort: Pulmonary effort is normal.      Breath sounds: Normal breath sounds.   Abdominal:      General: Bowel sounds are normal.      Palpations: Abdomen is soft.   Musculoskeletal:         General: Normal range of motion.   Skin:     Findings: No rash.   Neurological:      General: No focal deficit present.      Mental Status: He is alert and oriented to person, place, and time. Mental status is at baseline.   Psychiatric:         Mood and Affect: Mood normal.         Behavior: Behavior normal.          Assessment and Plan (including Health Maintenance)      Problem List  Smart Sets  Document Outside HM   :    Plan:   Instructed pt to go to nearest ED if he experiences chest pain and shortness of breath to be appropriately evaluated.       Health Maintenance Due   Topic Date Due    TETANUS VACCINE  Never done    Shingles Vaccine (1 of 2) Never done    RSV Vaccine (Age 60+) (1 - 1-dose 60+ series) Never done    COVID-19 Vaccine (4 - 2023-24 season) 09/01/2023    PROSTATE-SPECIFIC ANTIGEN  09/07/2023       Problem List Items Addressed This Visit          Psychiatric    Anxiety - Primary (Chronic) "     Other Visit Diagnoses       Body mass index (BMI) 24.0-24.9, adult        Encounter for vaccination              Anxiety    Body mass index (BMI) 24.0-24.9, adult    Encounter for vaccination    Other orders  -     Influenza - Quadrivalent (PF)       Health Maintenance Topics with due status: Not Due       Topic Last Completion Date    Colorectal Cancer Screening 05/17/2021    Lipid Panel 06/01/2023    High Dose Statin 11/07/2023           Future Appointments   Date Time Provider Department Center   12/1/2023  1:30 PM Kaya Go DNP, FNP [facility] [place]   5/22/2024  2:00 PM AWV NURSE, [facility] [place]        No follow-ups on file.     Signature:  Kaya Go DNP, FNP  [address] [place] [address]  Phone #: [phone]  Fax #: [phone]    Date of encounter: 11/7/23    Patient Instructions   Follow up as needed.

## 2023-11-30 ENCOUNTER — EXTERNAL CHRONIC CARE MANAGEMENT (OUTPATIENT)
Dept: FAMILY MEDICINE | Facility: CLINIC | Age: 63
End: 2023-11-30
Payer: COMMERCIAL

## 2023-11-30 PROCEDURE — G0511 PR CHRONIC CARE MGMT, RHC OR FQHC ONLY, 20 MINS OR MORE: ICD-10-PCS | Mod: ,,, | Performed by: NURSE PRACTITIONER

## 2023-11-30 PROCEDURE — G0511 CCM/BHI BY RHC/FQHC 20MIN MO: HCPCS | Mod: ,,, | Performed by: NURSE PRACTITIONER

## 2023-12-01 ENCOUNTER — OFFICE VISIT (OUTPATIENT)
Dept: FAMILY MEDICINE | Facility: CLINIC | Age: 63
End: 2023-12-01
Payer: COMMERCIAL

## 2023-12-01 VITALS
SYSTOLIC BLOOD PRESSURE: 152 MMHG | OXYGEN SATURATION: 97 % | HEART RATE: 76 BPM | BODY MASS INDEX: 24.75 KG/M2 | DIASTOLIC BLOOD PRESSURE: 83 MMHG | TEMPERATURE: 98 F | RESPIRATION RATE: 18 BRPM | HEIGHT: 66 IN | WEIGHT: 154 LBS

## 2023-12-01 DIAGNOSIS — E78.5 HYPERLIPIDEMIA, UNSPECIFIED HYPERLIPIDEMIA TYPE: ICD-10-CM

## 2023-12-01 DIAGNOSIS — K21.9 GASTROESOPHAGEAL REFLUX DISEASE, UNSPECIFIED WHETHER ESOPHAGITIS PRESENT: ICD-10-CM

## 2023-12-01 DIAGNOSIS — I10 HYPERTENSION, UNSPECIFIED TYPE: Primary | ICD-10-CM

## 2023-12-01 DIAGNOSIS — F41.9 ANXIETY: ICD-10-CM

## 2023-12-01 LAB
ALBUMIN SERPL BCP-MCNC: 3.8 G/DL (ref 3.5–5)
ALBUMIN/GLOB SERPL: 0.8 {RATIO}
ALP SERPL-CCNC: 119 U/L (ref 45–115)
ALT SERPL W P-5'-P-CCNC: 48 U/L (ref 16–61)
ANION GAP SERPL CALCULATED.3IONS-SCNC: 9 MMOL/L (ref 7–16)
AST SERPL W P-5'-P-CCNC: 28 U/L (ref 15–37)
BASOPHILS # BLD AUTO: 0.06 K/UL (ref 0–0.2)
BASOPHILS NFR BLD AUTO: 0.7 % (ref 0–1)
BILIRUB SERPL-MCNC: 0.5 MG/DL (ref ?–1.2)
BUN SERPL-MCNC: 14 MG/DL (ref 7–18)
BUN/CREAT SERPL: 12 (ref 6–20)
CALCIUM SERPL-MCNC: 9.4 MG/DL (ref 8.5–10.1)
CHLORIDE SERPL-SCNC: 107 MMOL/L (ref 98–107)
CHOLEST SERPL-MCNC: 250 MG/DL (ref 0–200)
CHOLEST/HDLC SERPL: 6.3 {RATIO}
CO2 SERPL-SCNC: 29 MMOL/L (ref 21–32)
CREAT SERPL-MCNC: 1.18 MG/DL (ref 0.7–1.3)
DIFFERENTIAL METHOD BLD: ABNORMAL
EGFR (NO RACE VARIABLE) (RUSH/TITUS): 69 ML/MIN/1.73M2
EOSINOPHIL # BLD AUTO: 0.38 K/UL (ref 0–0.5)
EOSINOPHIL NFR BLD AUTO: 4.4 % (ref 1–4)
ERYTHROCYTE [DISTWIDTH] IN BLOOD BY AUTOMATED COUNT: 12.7 % (ref 11.5–14.5)
GLOBULIN SER-MCNC: 4.7 G/DL (ref 2–4)
GLUCOSE SERPL-MCNC: 89 MG/DL (ref 74–106)
HCT VFR BLD AUTO: 45.8 % (ref 40–54)
HDLC SERPL-MCNC: 40 MG/DL (ref 40–60)
HGB BLD-MCNC: 14.6 G/DL (ref 13.5–18)
IMM GRANULOCYTES # BLD AUTO: 0.03 K/UL (ref 0–0.04)
IMM GRANULOCYTES NFR BLD: 0.3 % (ref 0–0.4)
LDLC SERPL CALC-MCNC: 166 MG/DL
LDLC/HDLC SERPL: 4.2 {RATIO}
LYMPHOCYTES # BLD AUTO: 1.74 K/UL (ref 1–4.8)
LYMPHOCYTES NFR BLD AUTO: 20.1 % (ref 27–41)
MCH RBC QN AUTO: 27.4 PG (ref 27–31)
MCHC RBC AUTO-ENTMCNC: 31.9 G/DL (ref 32–36)
MCV RBC AUTO: 86.1 FL (ref 80–96)
MONOCYTES # BLD AUTO: 0.67 K/UL (ref 0–0.8)
MONOCYTES NFR BLD AUTO: 7.8 % (ref 2–6)
MPC BLD CALC-MCNC: 10.8 FL (ref 9.4–12.4)
NEUTROPHILS # BLD AUTO: 5.76 K/UL (ref 1.8–7.7)
NEUTROPHILS NFR BLD AUTO: 66.7 % (ref 53–65)
NONHDLC SERPL-MCNC: 210 MG/DL
NRBC # BLD AUTO: 0 X10E3/UL
NRBC, AUTO (.00): 0 %
PLATELET # BLD AUTO: 221 K/UL (ref 150–400)
POTASSIUM SERPL-SCNC: 5.2 MMOL/L (ref 3.5–5.1)
PROT SERPL-MCNC: 8.5 G/DL (ref 6.4–8.2)
RBC # BLD AUTO: 5.32 M/UL (ref 4.6–6.2)
SODIUM SERPL-SCNC: 140 MMOL/L (ref 136–145)
TRIGL SERPL-MCNC: 218 MG/DL (ref 35–150)
VLDLC SERPL-MCNC: 44 MG/DL
WBC # BLD AUTO: 8.64 K/UL (ref 4.5–11)

## 2023-12-01 PROCEDURE — 85025 CBC WITH DIFFERENTIAL: ICD-10-PCS | Mod: ,,, | Performed by: CLINICAL MEDICAL LABORATORY

## 2023-12-01 PROCEDURE — 1159F PR MEDICATION LIST DOCUMENTED IN MEDICAL RECORD: ICD-10-PCS | Mod: ,,, | Performed by: NURSE PRACTITIONER

## 2023-12-01 PROCEDURE — 3008F BODY MASS INDEX DOCD: CPT | Mod: ,,, | Performed by: NURSE PRACTITIONER

## 2023-12-01 PROCEDURE — 1160F PR REVIEW ALL MEDS BY PRESCRIBER/CLIN PHARMACIST DOCUMENTED: ICD-10-PCS | Mod: ,,, | Performed by: NURSE PRACTITIONER

## 2023-12-01 PROCEDURE — 3061F PR NEG MICROALBUMINURIA RESULT DOCUMENTED/REVIEW: ICD-10-PCS | Mod: ,,, | Performed by: NURSE PRACTITIONER

## 2023-12-01 PROCEDURE — 3079F DIAST BP 80-89 MM HG: CPT | Mod: ,,, | Performed by: NURSE PRACTITIONER

## 2023-12-01 PROCEDURE — 80061 LIPID PANEL: CPT | Mod: ,,, | Performed by: CLINICAL MEDICAL LABORATORY

## 2023-12-01 PROCEDURE — 3077F PR MOST RECENT SYSTOLIC BLOOD PRESSURE >= 140 MM HG: ICD-10-PCS | Mod: ,,, | Performed by: NURSE PRACTITIONER

## 2023-12-01 PROCEDURE — 4010F ACE/ARB THERAPY RXD/TAKEN: CPT | Mod: ,,, | Performed by: NURSE PRACTITIONER

## 2023-12-01 PROCEDURE — 3008F PR BODY MASS INDEX (BMI) DOCUMENTED: ICD-10-PCS | Mod: ,,, | Performed by: NURSE PRACTITIONER

## 2023-12-01 PROCEDURE — 3066F PR DOCUMENTATION OF TREATMENT FOR NEPHROPATHY: ICD-10-PCS | Mod: ,,, | Performed by: NURSE PRACTITIONER

## 2023-12-01 PROCEDURE — 1160F RVW MEDS BY RX/DR IN RCRD: CPT | Mod: ,,, | Performed by: NURSE PRACTITIONER

## 2023-12-01 PROCEDURE — 3079F PR MOST RECENT DIASTOLIC BLOOD PRESSURE 80-89 MM HG: ICD-10-PCS | Mod: ,,, | Performed by: NURSE PRACTITIONER

## 2023-12-01 PROCEDURE — 99214 PR OFFICE/OUTPT VISIT, EST, LEVL IV, 30-39 MIN: ICD-10-PCS | Mod: ,,, | Performed by: NURSE PRACTITIONER

## 2023-12-01 PROCEDURE — 3077F SYST BP >= 140 MM HG: CPT | Mod: ,,, | Performed by: NURSE PRACTITIONER

## 2023-12-01 PROCEDURE — 80053 COMPREHENSIVE METABOLIC PANEL: ICD-10-PCS | Mod: ,,, | Performed by: CLINICAL MEDICAL LABORATORY

## 2023-12-01 PROCEDURE — 4010F PR ACE/ARB THEARPY RXD/TAKEN: ICD-10-PCS | Mod: ,,, | Performed by: NURSE PRACTITIONER

## 2023-12-01 PROCEDURE — 85025 COMPLETE CBC W/AUTO DIFF WBC: CPT | Mod: ,,, | Performed by: CLINICAL MEDICAL LABORATORY

## 2023-12-01 PROCEDURE — 3061F NEG MICROALBUMINURIA REV: CPT | Mod: ,,, | Performed by: NURSE PRACTITIONER

## 2023-12-01 PROCEDURE — 80053 COMPREHEN METABOLIC PANEL: CPT | Mod: ,,, | Performed by: CLINICAL MEDICAL LABORATORY

## 2023-12-01 PROCEDURE — 80061 LIPID PANEL: ICD-10-PCS | Mod: ,,, | Performed by: CLINICAL MEDICAL LABORATORY

## 2023-12-01 PROCEDURE — 3066F NEPHROPATHY DOC TX: CPT | Mod: ,,, | Performed by: NURSE PRACTITIONER

## 2023-12-01 PROCEDURE — 99214 OFFICE O/P EST MOD 30 MIN: CPT | Mod: ,,, | Performed by: NURSE PRACTITIONER

## 2023-12-01 PROCEDURE — 1159F MED LIST DOCD IN RCRD: CPT | Mod: ,,, | Performed by: NURSE PRACTITIONER

## 2023-12-01 RX ORDER — LISINOPRIL 10 MG/1
10 TABLET ORAL DAILY
Qty: 90 TABLET | Refills: 1 | Status: SHIPPED | OUTPATIENT
Start: 2023-12-01

## 2023-12-01 RX ORDER — ATORVASTATIN CALCIUM 40 MG/1
40 TABLET, FILM COATED ORAL DAILY
Qty: 90 TABLET | Refills: 1 | Status: SHIPPED | OUTPATIENT
Start: 2023-12-01

## 2023-12-01 RX ORDER — OMEPRAZOLE 20 MG/1
20 CAPSULE, DELAYED RELEASE ORAL DAILY
Qty: 90 CAPSULE | Refills: 1 | Status: SHIPPED | OUTPATIENT
Start: 2023-12-01

## 2023-12-01 RX ORDER — BUSPIRONE HYDROCHLORIDE 10 MG/1
10 TABLET ORAL 2 TIMES DAILY
Qty: 180 TABLET | Refills: 1 | Status: SHIPPED | OUTPATIENT
Start: 2023-12-01

## 2023-12-01 NOTE — PROGRESS NOTES
Kaya Go DNP, FNP    [address] [place] [address]     PATIENT NAME: Amador Callahan  : 1960  DATE: 23  MRN: [id number]      Billing Provider: Kaya Go DNP, FNP  Level of Service:   Patient PCP Information       Provider PCP Type    Kaya Go DNP, FNP General            Reason for Visit / Chief Complaint: Follow-up (6 month check up)       Update PCP  Update Chief Complaint         History of Present Illness / Problem Focused Workflow     Amador Callahan presents to the clinic with Follow-up (6 month check up)   Pt is a bit anxious today. Pt is having housing issues.  Follow-up  Pertinent negatives include no abdominal pain, arthralgias, change in bowel habit, chest pain, chills, congestion, coughing, fatigue, fever, headaches, myalgias, nausea, rash, sore throat, vomiting or weakness.       Review of Systems     Review of Systems   Constitutional:  Negative for activity change, appetite change, chills, fatigue and fever.   HENT:  Negative for nasal congestion, ear pain, hearing loss, postnasal drip and sore throat.    Respiratory:  Negative for cough, chest tightness, shortness of breath and wheezing.    Cardiovascular:  Negative for chest pain, palpitations, leg swelling and claudication.   Gastrointestinal:  Negative for abdominal pain, change in bowel habit, constipation, diarrhea, nausea and vomiting.   Genitourinary:  Negative for dysuria.   Musculoskeletal:  Negative for arthralgias, back pain, gait problem and myalgias.   Integumentary:  Negative for rash.   Neurological:  Negative for weakness and headaches.   Psychiatric/Behavioral:  Negative for suicidal ideas. The patient is not nervous/anxious.         Medical / Social / Family History     Past Medical History:   Diagnosis Date    Adenomatous polyp of descending colon 2021    Anxiety     Diverticula, colon 2021    GERD (gastroesophageal reflux disease)     History of  cerebrovascular accident     Hyperlipidemia     Hypertension     Screening for malignant neoplasm of colon 5/17/2021       Past Surgical History:   Procedure Laterality Date    EPIDURAL STEROID INJECTION INTO CERVICAL SPINE N/A 12/2/2022    Procedure: Injection-steroid-epidural-cervical, C4/5 EDE;  Surgeon: Angelic Hdz MD;  Location: [facility];  Service: Pain Management;  Laterality: N/A;    heart cath         Social History  Mr. Amador Clalahan  reports that he quit smoking about 43 years ago. His smoking use included cigarettes. He started smoking about 45 years ago. He has a 2.0 pack-year smoking history. He has been exposed to tobacco smoke. He has never used smokeless tobacco. He reports that he does not drink alcohol and does not use drugs.    Family History  Mr. Amador Callahan's family history includes Cancer in his father and sister; Heart disease in his mother.    Medications and Allergies     Medications  Outpatient Medications Marked as Taking for the 12/1/23 encounter (Office Visit) with Kaya Go, VERONIKA, FNP   Medication Sig Dispense Refill    [DISCONTINUED] atorvastatin (LIPITOR) 40 MG tablet Take 1 tablet (40 mg total) by mouth once daily. 90 tablet 1    [DISCONTINUED] busPIRone (BUSPAR) 10 MG tablet Take 1 tablet (10 mg total) by mouth 2 (two) times a day. 180 tablet 1    [DISCONTINUED] lisinopriL 10 MG tablet Take 1 tablet (10 mg total) by mouth once daily. 90 tablet 1    [DISCONTINUED] omeprazole (PRILOSEC) 20 MG capsule Take 1 capsule (20 mg total) by mouth once daily. 90 capsule 1       Allergies  Review of patient's allergies indicates:  No Known Allergies    Physical Examination     Vitals:    12/01/23 1329 12/01/23 1332   BP: (!) 146/93 (!) 152/83   BP Location: Left arm Right arm   Patient Position: Sitting Sitting   BP Method: Medium (Automatic) Medium (Automatic)   Pulse: 76    Resp: 18    Temp: 98.3 °F (36.8 °C)    TempSrc: Oral    SpO2: 97%    Weight: 69.9 kg (154 lb)   "  Height: 5' 6" (1.676 m)      Physical Exam  Vitals and nursing note reviewed.   Constitutional:       General: He is not in acute distress.     Appearance: Normal appearance. He is not ill-appearing.   HENT:      Mouth/Throat:      Comments: Mouth drawn--chronic  Eyes:      Extraocular Movements: Extraocular movements intact.      Pupils: Pupils are equal, round, and reactive to light.   Cardiovascular:      Rate and Rhythm: Normal rate and regular rhythm.      Heart sounds: Normal heart sounds.   Pulmonary:      Effort: Pulmonary effort is normal.      Breath sounds: Normal breath sounds.   Abdominal:      General: Bowel sounds are normal.      Palpations: Abdomen is soft.   Musculoskeletal:         General: Normal range of motion.   Skin:     Findings: No rash.   Neurological:      General: No focal deficit present.      Mental Status: He is alert and oriented to person, place, and time. Mental status is at baseline.   Psychiatric:         Mood and Affect: Mood normal.         Behavior: Behavior normal.          Assessment and Plan (including Health Maintenance)      Problem List  [employer]  Document Outside HM   :    Plan:     Contact information for government agency to assist with housing of aging was obtained and given to patient.     Health Maintenance Due   Topic Date Due    TETANUS VACCINE  Never done    Shingles Vaccine (1 of 2) Never done    RSV Vaccine (Age 60+ and Pregnant patients) (1 - 1-dose 60+ series) Never done    COVID-19 Vaccine (4 - 2023-24 season) 09/01/2023    PROSTATE-SPECIFIC ANTIGEN  09/07/2023       Problem List Items Addressed This Visit          Psychiatric    Anxiety (Chronic)    Relevant Medications    busPIRone (BUSPAR) 10 MG tablet       Cardiac/Vascular    Hyperlipidemia (Chronic)    Relevant Medications    atorvastatin (LIPITOR) 40 MG tablet    Other Relevant Orders    Comprehensive Metabolic Panel (Completed)    Lipid Panel (Completed)    Hypertension - Primary (Chronic)    " Relevant Medications    lisinopriL 10 MG tablet    Other Relevant Orders    Comprehensive Metabolic Panel (Completed)    CBC Auto Differential (Completed)    Lipid Panel (Completed)       GI    GERD (gastroesophageal reflux disease) (Chronic)    Relevant Medications    omeprazole (PRILOSEC) 20 MG capsule     Hypertension, unspecified type  -     lisinopriL 10 MG tablet; Take 1 tablet (10 mg total) by mouth once daily.  Dispense: 90 tablet; Refill: 1  -     Comprehensive Metabolic Panel; Future; Expected date: 12/01/2023  -     CBC Auto Differential; Future; Expected date: 12/01/2023  -     Lipid Panel; Future; Expected date: 12/01/2023    Hyperlipidemia, unspecified hyperlipidemia type  -     atorvastatin (LIPITOR) 40 MG tablet; Take 1 tablet (40 mg total) by mouth once daily.  Dispense: 90 tablet; Refill: 1  -     Comprehensive Metabolic Panel; Future; Expected date: 12/01/2023  -     Lipid Panel; Future; Expected date: 12/01/2023    Anxiety  -     busPIRone (BUSPAR) 10 MG tablet; Take 1 tablet (10 mg total) by mouth 2 (two) times a day.  Dispense: 180 tablet; Refill: 1    Gastroesophageal reflux disease, unspecified whether esophagitis present  -     omeprazole (PRILOSEC) 20 MG capsule; Take 1 capsule (20 mg total) by mouth once daily.  Dispense: 90 capsule; Refill: 1       Health Maintenance Topics with due status: Not Due       Topic Last Completion Date    Colorectal Cancer Screening 05/17/2021    High Dose Statin 12/01/2023    Lipid Panel 12/01/2023           Future Appointments   Date Time Provider Department Center   5/22/2024  2:00 PM AWV NURSE, [facility] FAMILY MEDICINE [facility] [place]        Follow up in about 6 months (around 6/1/2024).     Signature:  Kaya Go DNP, FNP  [address] [place] [address]  Phone #: [phone]  Fax #: [phone]    Date of encounter: 12/1/23    Patient Instructions   Continue current medication regimen. Will  call pt with lab results. Recommend exercise 30 minutes per day most days of the week. Follow up in 6 months for chronic medical problems.  Monitor blood pressure for 2 weeks. Make sure that you have empty bladder, resting for 5 minutes, and feet flat on the floor.

## 2023-12-03 NOTE — PATIENT INSTRUCTIONS
Continue current medication regimen. Will call pt with lab results. Recommend exercise 30 minutes per day most days of the week. Follow up in 6 months for chronic medical problems.  Monitor blood pressure for 2 weeks. Make sure that you have empty bladder, resting for 5 minutes, and feet flat on the floor.

## 2023-12-31 ENCOUNTER — EXTERNAL CHRONIC CARE MANAGEMENT (OUTPATIENT)
Dept: FAMILY MEDICINE | Facility: CLINIC | Age: 63
End: 2023-12-31
Payer: COMMERCIAL

## 2023-12-31 PROCEDURE — G0511 CCM/BHI BY RHC/FQHC 20MIN MO: HCPCS | Mod: ,,, | Performed by: NURSE PRACTITIONER

## 2024-01-31 ENCOUNTER — EXTERNAL CHRONIC CARE MANAGEMENT (OUTPATIENT)
Dept: FAMILY MEDICINE | Facility: CLINIC | Age: 64
End: 2024-01-31
Payer: COMMERCIAL

## 2024-01-31 PROCEDURE — G0511 CCM/BHI BY RHC/FQHC 20MIN MO: HCPCS | Mod: ,,, | Performed by: NURSE PRACTITIONER

## 2024-02-13 ENCOUNTER — OFFICE VISIT (OUTPATIENT)
Dept: FAMILY MEDICINE | Facility: CLINIC | Age: 64
End: 2024-02-13
Payer: COMMERCIAL

## 2024-02-13 VITALS
HEART RATE: 81 BPM | DIASTOLIC BLOOD PRESSURE: 67 MMHG | RESPIRATION RATE: 18 BRPM | BODY MASS INDEX: 24.59 KG/M2 | HEIGHT: 66 IN | WEIGHT: 153 LBS | SYSTOLIC BLOOD PRESSURE: 119 MMHG | TEMPERATURE: 98 F | OXYGEN SATURATION: 96 %

## 2024-02-13 DIAGNOSIS — I10 HYPERTENSION, UNSPECIFIED TYPE: Primary | ICD-10-CM

## 2024-02-13 DIAGNOSIS — E78.5 HYPERLIPIDEMIA, UNSPECIFIED HYPERLIPIDEMIA TYPE: ICD-10-CM

## 2024-02-13 DIAGNOSIS — E87.5 HYPERKALEMIA: ICD-10-CM

## 2024-02-13 DIAGNOSIS — F41.9 ANXIETY: ICD-10-CM

## 2024-02-13 LAB
ANION GAP SERPL CALCULATED.3IONS-SCNC: 6 MMOL/L (ref 7–16)
BUN SERPL-MCNC: 12 MG/DL (ref 7–18)
BUN/CREAT SERPL: 11 (ref 6–20)
CALCIUM SERPL-MCNC: 9.4 MG/DL (ref 8.5–10.1)
CHLORIDE SERPL-SCNC: 109 MMOL/L (ref 98–107)
CO2 SERPL-SCNC: 28 MMOL/L (ref 21–32)
CREAT SERPL-MCNC: 1.13 MG/DL (ref 0.7–1.3)
EGFR (NO RACE VARIABLE) (RUSH/TITUS): 72 ML/MIN/1.73M2
GLUCOSE SERPL-MCNC: 79 MG/DL (ref 74–106)
POTASSIUM SERPL-SCNC: 4.4 MMOL/L (ref 3.5–5.1)
SODIUM SERPL-SCNC: 139 MMOL/L (ref 136–145)

## 2024-02-13 PROCEDURE — 1160F RVW MEDS BY RX/DR IN RCRD: CPT | Mod: ,,, | Performed by: NURSE PRACTITIONER

## 2024-02-13 PROCEDURE — 1126F AMNT PAIN NOTED NONE PRSNT: CPT | Mod: ,,, | Performed by: NURSE PRACTITIONER

## 2024-02-13 PROCEDURE — 1101F PT FALLS ASSESS-DOCD LE1/YR: CPT | Mod: ,,, | Performed by: NURSE PRACTITIONER

## 2024-02-13 PROCEDURE — 3008F BODY MASS INDEX DOCD: CPT | Mod: ,,, | Performed by: NURSE PRACTITIONER

## 2024-02-13 PROCEDURE — 80048 BASIC METABOLIC PNL TOTAL CA: CPT | Mod: ,,, | Performed by: CLINICAL MEDICAL LABORATORY

## 2024-02-13 PROCEDURE — 3078F DIAST BP <80 MM HG: CPT | Mod: ,,, | Performed by: NURSE PRACTITIONER

## 2024-02-13 PROCEDURE — 99214 OFFICE O/P EST MOD 30 MIN: CPT | Mod: ,,, | Performed by: NURSE PRACTITIONER

## 2024-02-13 PROCEDURE — 3288F FALL RISK ASSESSMENT DOCD: CPT | Mod: ,,, | Performed by: NURSE PRACTITIONER

## 2024-02-13 PROCEDURE — 3074F SYST BP LT 130 MM HG: CPT | Mod: ,,, | Performed by: NURSE PRACTITIONER

## 2024-02-13 PROCEDURE — 1159F MED LIST DOCD IN RCRD: CPT | Mod: ,,, | Performed by: NURSE PRACTITIONER

## 2024-02-13 NOTE — PROGRESS NOTES
Kaya Go DNP, FNP    [address] [place] [address]     PATIENT NAME: Amador Callahan  : 1960  DATE: 24  MRN: [id number]      Billing Provider: Kaya Go DNP, FNP  Level of Service:   Patient PCP Information       Provider PCP Type    Kaya Go DNP, FNP General            Reason for Visit / Chief Complaint: Follow-up (2 month f/u)       Update PCP  Update Chief Complaint         History of Present Illness / Problem Focused Workflow     Amador Callahan presents to the clinic with Follow-up (2 month f/u)   Pt is a bit anxious today. Pt is having housing issues.  Follow-up  Pertinent negatives include no abdominal pain, arthralgias, change in bowel habit, chest pain, chills, congestion, coughing, fatigue, fever, headaches, myalgias, nausea, rash, sore throat, vomiting or weakness.       Review of Systems     Review of Systems   Constitutional:  Negative for activity change, appetite change, chills, fatigue and fever.   HENT:  Negative for nasal congestion, ear pain, hearing loss, postnasal drip and sore throat.    Respiratory:  Negative for cough, chest tightness, shortness of breath and wheezing.    Cardiovascular:  Negative for chest pain, palpitations, leg swelling and claudication.   Gastrointestinal:  Negative for abdominal pain, change in bowel habit, constipation, diarrhea, nausea and vomiting.   Genitourinary:  Negative for dysuria.   Musculoskeletal:  Negative for arthralgias, back pain, gait problem and myalgias.   Integumentary:  Negative for rash.   Neurological:  Negative for weakness and headaches.   Psychiatric/Behavioral:  Negative for suicidal ideas. The patient is not nervous/anxious.         Medical / Social / Family History     Past Medical History:   Diagnosis Date    Adenomatous polyp of descending colon 2021    Anxiety     Diverticula, colon 2021    GERD (gastroesophageal reflux disease)     History of  "cerebrovascular accident     Hyperlipidemia     Hypertension     Screening for malignant neoplasm of colon 5/17/2021       Past Surgical History:   Procedure Laterality Date    EPIDURAL STEROID INJECTION INTO CERVICAL SPINE N/A 12/02/2022    Procedure: Injection-steroid-epidural-cervical, C4/5 EDE;  Surgeon: Angelic Hdz MD;  Location: [facility];  Service: Pain Management;  Laterality: N/A;    heart cath      MASTOID SURGERY Left 12/15/2023    middle ear and mastoid surgery       Social History  Mr. Amador Callahan  reports that he quit smoking about 44 years ago. His smoking use included cigarettes. He started smoking about 46 years ago. He has a 2.0 pack-year smoking history. He has been exposed to tobacco smoke. He has never used smokeless tobacco. He reports that he does not drink alcohol and does not use drugs.    Family History  Mr. Amador Callahan's family history includes Cancer in his father and sister; Heart disease in his mother.    Medications and Allergies     Medications  Outpatient Medications Marked as Taking for the 2/13/24 encounter (Office Visit) with Kaya Go, VERONIKA, FNP   Medication Sig Dispense Refill    atorvastatin (LIPITOR) 40 MG tablet Take 1 tablet (40 mg total) by mouth once daily. 90 tablet 1    busPIRone (BUSPAR) 10 MG tablet Take 1 tablet (10 mg total) by mouth 2 (two) times a day. 180 tablet 1    lisinopriL 10 MG tablet Take 1 tablet (10 mg total) by mouth once daily. 90 tablet 1    omeprazole (PRILOSEC) 20 MG capsule Take 1 capsule (20 mg total) by mouth once daily. 90 capsule 1       Allergies  Review of patient's allergies indicates:  No Known Allergies    Physical Examination     Vitals:    02/13/24 1402   BP: 119/67   BP Location: Left arm   Patient Position: Sitting   BP Method: Large (Automatic)   Pulse: 81   Resp: 18   Temp: 98.1 °F (36.7 °C)   TempSrc: Oral   SpO2: 96%   Weight: 69.4 kg (153 lb)   Height: 5' 6" (1.676 m)     Physical Exam  Vitals and " nursing note reviewed.   Constitutional:       General: He is not in acute distress.     Appearance: Normal appearance. He is normal weight. He is not ill-appearing.   HENT:      Mouth/Throat:      Mouth: Mucous membranes are moist.      Comments: Mouth drawn--chronic  Eyes:      Extraocular Movements: Extraocular movements intact.      Pupils: Pupils are equal, round, and reactive to light.   Cardiovascular:      Rate and Rhythm: Normal rate and regular rhythm.      Pulses: Normal pulses.      Heart sounds: Normal heart sounds. No murmur heard.  Pulmonary:      Effort: Pulmonary effort is normal. No respiratory distress.      Breath sounds: Normal breath sounds. No wheezing, rhonchi or rales.   Chest:      Chest wall: No tenderness.   Abdominal:      General: Bowel sounds are normal. There is no distension.      Palpations: Abdomen is soft.      Tenderness: There is no abdominal tenderness. There is no right CVA tenderness, left CVA tenderness, guarding or rebound.   Musculoskeletal:         General: No swelling or tenderness. Normal range of motion.      Cervical back: Normal range of motion and neck supple.      Right lower leg: No edema.      Left lower leg: No edema.   Skin:     General: Skin is warm and dry.      Findings: No rash.   Neurological:      General: No focal deficit present.      Mental Status: He is alert and oriented to person, place, and time. Mental status is at baseline.   Psychiatric:         Mood and Affect: Mood normal.         Behavior: Behavior normal.         Thought Content: Thought content normal.         Judgment: Judgment normal.          Assessment and Plan (including Health Maintenance)      Problem List  Smart Sets  Document Outside HM   :    Plan:       Health Maintenance Due   Topic Date Due    TETANUS VACCINE  Never done    Shingles Vaccine (1 of 2) Never done    RSV Vaccine (Age 60+ and Pregnant patients) (1 - 1-dose 60+ series) Never done    COVID-19 Vaccine (4 - 2023-24  season) 09/01/2023    PROSTATE-SPECIFIC ANTIGEN  09/07/2023    Pneumococcal Vaccines (Age 65+) (1 of 1 - PCV) Never done    Abdominal Aortic Aneurysm Screening  Never done       Problem List Items Addressed This Visit          Psychiatric    Anxiety (Chronic)       Cardiac/Vascular    Hyperlipidemia (Chronic)    Hypertension - Primary (Chronic)     Other Visit Diagnoses       Hyperkalemia        Relevant Orders    Basic Metabolic Panel          Hypertension, unspecified type    Hyperkalemia  -     Basic Metabolic Panel; Future; Expected date: 02/13/2024    Anxiety    Hyperlipidemia, unspecified hyperlipidemia type       Health Maintenance Topics with due status: Not Due       Topic Last Completion Date    Colorectal Cancer Screening 05/17/2021    Lipid Panel 12/01/2023    High Dose Statin 02/13/2024           Future Appointments   Date Time Provider Department Center   5/22/2024  2:00 PM AWV NURSE, [facility] FAMILY MEDICINE [facility] [place]        Follow up in about 3 months (around 5/13/2024).     Signature:  Kaya Go DNP, FNP  [address] [place] [address]  Phone #: [phone]  Fax #: [phone]    Date of encounter: 2/13/24    Patient Instructions   Continue current medication regimen. Will call pt with lab results. Recommend exercise 30 minutes per day most days of the week. Follow up in 6 months for chronic medical problems.

## 2024-02-29 ENCOUNTER — EXTERNAL CHRONIC CARE MANAGEMENT (OUTPATIENT)
Dept: FAMILY MEDICINE | Facility: CLINIC | Age: 64
End: 2024-02-29
Payer: COMMERCIAL

## 2024-02-29 PROCEDURE — G0511 CCM/BHI BY RHC/FQHC 20MIN MO: HCPCS | Mod: ,,, | Performed by: NURSE PRACTITIONER

## 2024-05-21 NOTE — PROGRESS NOTES
"  Amador Callahan presented for a  Medicare AWV and comprehensive Health Risk Assessment today. The following components were reviewed and updated:    Medical history  Family History  Social history  Allergies and Current Medications  Health Risk Assessment  Health Maintenance  Care Team         ** See Completed Assessments for Annual Wellness Visit within the encounter summary.**         The following assessments were completed:  Living Situation  CAGE  Depression Screening  Timed Get Up and Go  Whisper Test  Cognitive Function Screening  Nutrition Screening  ADL Screening  PAQ Screening        Opioid Documentation    does not have a current opioid prescription.       Vitals:    05/22/24 1432   BP: 122/73   Pulse: 77   Resp: 13   Temp: 97.6 °F (36.4 °C)   SpO2: 99%   Weight: 67.3 kg (148 lb 4.8 oz)   Height: 5' 6" (1.676 m)     Body mass index is 23.94 kg/m².  Physical Exam  Vitals and nursing note reviewed.   Constitutional:       General: He is not in acute distress.     Appearance: Normal appearance. He is normal weight. He is not ill-appearing.   HENT:      Mouth/Throat:      Mouth: Mucous membranes are moist.      Comments: Mouth drawn--chronic  Eyes:      Extraocular Movements: Extraocular movements intact.      Pupils: Pupils are equal, round, and reactive to light.   Cardiovascular:      Rate and Rhythm: Normal rate and regular rhythm.      Pulses: Normal pulses.      Heart sounds: Normal heart sounds. No murmur heard.  Pulmonary:      Effort: Pulmonary effort is normal. No respiratory distress.      Breath sounds: Normal breath sounds. No wheezing, rhonchi or rales.   Chest:      Chest wall: No tenderness.   Abdominal:      General: Bowel sounds are normal. There is no distension.      Palpations: Abdomen is soft.      Tenderness: There is no abdominal tenderness. There is no right CVA tenderness, left CVA tenderness, guarding or rebound.   Musculoskeletal:         General: No swelling or tenderness. Normal " range of motion.      Cervical back: Normal range of motion and neck supple.      Right lower leg: No edema.      Left lower leg: No edema.   Skin:     General: Skin is warm and dry.      Findings: No rash.   Neurological:      General: No focal deficit present.      Mental Status: He is alert and oriented to person, place, and time. Mental status is at baseline.   Psychiatric:         Mood and Affect: Mood normal.         Behavior: Behavior normal.         Thought Content: Thought content normal.         Judgment: Judgment normal.               Diagnoses and health risks identified today and associated recommendations/orders:    Problem List Items Addressed This Visit          Psychiatric    Anxiety (Chronic)    Relevant Medications    busPIRone (BUSPAR) 10 MG tablet       Cardiac/Vascular    Hyperlipidemia (Chronic)    Relevant Medications    atorvastatin (LIPITOR) 40 MG tablet    Other Relevant Orders    Comprehensive Metabolic Panel    Lipid Panel    Hypertension (Chronic)    Relevant Medications    lisinopriL 10 MG tablet    Other Relevant Orders    Comprehensive Metabolic Panel    CBC Auto Differential    Lipid Panel    Microalbumin/Creatinine Ratio, Urine    POCT URINALYSIS W/O SCOPE (Completed)       GI    GERD (gastroesophageal reflux disease) (Chronic)    Relevant Medications    omeprazole (PRILOSEC) 20 MG capsule     Other Visit Diagnoses       Encounter for subsequent annual wellness visit (AWV) in Medicare patient    -  Primary    Screening for malignant neoplasm of prostate        Relevant Orders    PSA, Screening    BMI 23.0-23.9, adult        Other reduced mobility                Provided Amador with a 5-10 year written screening schedule and personal prevention plan. Recommendations were developed using the USPSTF age appropriate recommendations. Education, counseling, and referrals were provided as needed. After Visit Summary printed and given to patient which includes a list of additional  screenings\tests needed.    Follow up for 1 year for Annual Wellness Visit.        I offered to discuss advanced care planning, including how to pick a person who would make decisions for you if you were unable to make them for yourself, called a health care power of , and what kind of decisions you might make such as use of life sustaining treatments such as ventilators and tube feeding when faced with a life limiting illness recorded on a living will that they will need to know. (How you want to be cared for as you near the end of your natural life)     X Patient is interested in learning more about how to make advanced directives.  I provided them paperwork and offered to discuss this with them.

## 2024-05-22 ENCOUNTER — OFFICE VISIT (OUTPATIENT)
Dept: FAMILY MEDICINE | Facility: CLINIC | Age: 64
End: 2024-05-22
Payer: COMMERCIAL

## 2024-05-22 VITALS
WEIGHT: 148.31 LBS | HEIGHT: 66 IN | SYSTOLIC BLOOD PRESSURE: 122 MMHG | HEART RATE: 77 BPM | BODY MASS INDEX: 23.83 KG/M2 | OXYGEN SATURATION: 99 % | DIASTOLIC BLOOD PRESSURE: 73 MMHG | TEMPERATURE: 98 F | RESPIRATION RATE: 13 BRPM

## 2024-05-22 DIAGNOSIS — I10 HYPERTENSION, UNSPECIFIED TYPE: ICD-10-CM

## 2024-05-22 DIAGNOSIS — F41.9 ANXIETY: ICD-10-CM

## 2024-05-22 DIAGNOSIS — K21.9 GASTROESOPHAGEAL REFLUX DISEASE, UNSPECIFIED WHETHER ESOPHAGITIS PRESENT: ICD-10-CM

## 2024-05-22 DIAGNOSIS — E78.5 HYPERLIPIDEMIA, UNSPECIFIED HYPERLIPIDEMIA TYPE: ICD-10-CM

## 2024-05-22 DIAGNOSIS — Z12.5 SCREENING FOR MALIGNANT NEOPLASM OF PROSTATE: ICD-10-CM

## 2024-05-22 DIAGNOSIS — Z74.09 OTHER REDUCED MOBILITY: ICD-10-CM

## 2024-05-22 DIAGNOSIS — Z00.00 ENCOUNTER FOR SUBSEQUENT ANNUAL WELLNESS VISIT (AWV) IN MEDICARE PATIENT: Primary | ICD-10-CM

## 2024-05-22 LAB
ALBUMIN SERPL BCP-MCNC: 3.7 G/DL (ref 3.5–5)
ALBUMIN/GLOB SERPL: 1 {RATIO}
ALP SERPL-CCNC: 126 U/L (ref 45–115)
ALT SERPL W P-5'-P-CCNC: 36 U/L (ref 16–61)
ANION GAP SERPL CALCULATED.3IONS-SCNC: 10 MMOL/L (ref 7–16)
AST SERPL W P-5'-P-CCNC: 25 U/L (ref 15–37)
BASOPHILS # BLD AUTO: 0.05 K/UL (ref 0–0.2)
BASOPHILS NFR BLD AUTO: 0.6 % (ref 0–1)
BILIRUB SERPL-MCNC: 0.4 MG/DL (ref ?–1.2)
BILIRUB SERPL-MCNC: NORMAL MG/DL
BLOOD URINE, POC: NORMAL
BUN SERPL-MCNC: 15 MG/DL (ref 7–18)
BUN/CREAT SERPL: 13 (ref 6–20)
CALCIUM SERPL-MCNC: 8.9 MG/DL (ref 8.5–10.1)
CHLORIDE SERPL-SCNC: 109 MMOL/L (ref 98–107)
CHOLEST SERPL-MCNC: 155 MG/DL (ref 0–200)
CHOLEST/HDLC SERPL: 3.8 {RATIO}
CO2 SERPL-SCNC: 27 MMOL/L (ref 21–32)
COLOR, POC UA: YELLOW
CREAT SERPL-MCNC: 1.13 MG/DL (ref 0.7–1.3)
CREAT UR-MCNC: 239 MG/DL (ref 39–259)
DIFFERENTIAL METHOD BLD: ABNORMAL
EGFR (NO RACE VARIABLE) (RUSH/TITUS): 72 ML/MIN/1.73M2
EOSINOPHIL # BLD AUTO: 0.22 K/UL (ref 0–0.5)
EOSINOPHIL NFR BLD AUTO: 2.6 % (ref 1–4)
ERYTHROCYTE [DISTWIDTH] IN BLOOD BY AUTOMATED COUNT: 12.7 % (ref 11.5–14.5)
GLOBULIN SER-MCNC: 3.7 G/DL (ref 2–4)
GLUCOSE SERPL-MCNC: 90 MG/DL (ref 74–106)
GLUCOSE UR QL STRIP: NORMAL
HCT VFR BLD AUTO: 43.9 % (ref 40–54)
HDLC SERPL-MCNC: 41 MG/DL (ref 40–60)
HGB BLD-MCNC: 13.4 G/DL (ref 13.5–18)
IMM GRANULOCYTES # BLD AUTO: 0.01 K/UL (ref 0–0.04)
IMM GRANULOCYTES NFR BLD: 0.1 % (ref 0–0.4)
KETONES UR QL STRIP: NORMAL
LDLC SERPL CALC-MCNC: 80 MG/DL
LDLC/HDLC SERPL: 2 {RATIO}
LEUKOCYTE ESTERASE URINE, POC: NORMAL
LYMPHOCYTES # BLD AUTO: 1.73 K/UL (ref 1–4.8)
LYMPHOCYTES NFR BLD AUTO: 20.1 % (ref 27–41)
MCH RBC QN AUTO: 26.6 PG (ref 27–31)
MCHC RBC AUTO-ENTMCNC: 30.5 G/DL (ref 32–36)
MCV RBC AUTO: 87.1 FL (ref 80–96)
MICROALBUMIN UR-MCNC: 3.2 MG/DL (ref 0–2.8)
MICROALBUMIN/CREAT RATIO PNL UR: 13.4 MG/G (ref 0–30)
MONOCYTES # BLD AUTO: 0.77 K/UL (ref 0–0.8)
MONOCYTES NFR BLD AUTO: 9 % (ref 2–6)
MPC BLD CALC-MCNC: 11 FL (ref 9.4–12.4)
NEUTROPHILS # BLD AUTO: 5.82 K/UL (ref 1.8–7.7)
NEUTROPHILS NFR BLD AUTO: 67.6 % (ref 53–65)
NITRITE, POC UA: NORMAL
NONHDLC SERPL-MCNC: 114 MG/DL
NRBC # BLD AUTO: 0 X10E3/UL
NRBC, AUTO (.00): 0 %
PH, POC UA: 7
PLATELET # BLD AUTO: 217 K/UL (ref 150–400)
POTASSIUM SERPL-SCNC: 4.3 MMOL/L (ref 3.5–5.1)
PROT SERPL-MCNC: 7.4 G/DL (ref 6.4–8.2)
PROTEIN, POC: 30
PSA SERPL-MCNC: 0.77 NG/ML
RBC # BLD AUTO: 5.04 M/UL (ref 4.6–6.2)
SODIUM SERPL-SCNC: 142 MMOL/L (ref 136–145)
SPECIFIC GRAVITY, POC UA: 1.02
TRIGL SERPL-MCNC: 169 MG/DL (ref 35–150)
UROBILINOGEN, POC UA: 4
VLDLC SERPL-MCNC: 34 MG/DL
WBC # BLD AUTO: 8.6 K/UL (ref 4.5–11)

## 2024-05-22 PROCEDURE — 1159F MED LIST DOCD IN RCRD: CPT | Mod: ,,, | Performed by: NURSE PRACTITIONER

## 2024-05-22 PROCEDURE — 3074F SYST BP LT 130 MM HG: CPT | Mod: ,,, | Performed by: NURSE PRACTITIONER

## 2024-05-22 PROCEDURE — 1101F PT FALLS ASSESS-DOCD LE1/YR: CPT | Mod: ,,, | Performed by: NURSE PRACTITIONER

## 2024-05-22 PROCEDURE — 1158F ADVNC CARE PLAN TLK DOCD: CPT | Mod: ,,, | Performed by: NURSE PRACTITIONER

## 2024-05-22 PROCEDURE — 81003 URINALYSIS AUTO W/O SCOPE: CPT | Mod: QW,,, | Performed by: NURSE PRACTITIONER

## 2024-05-22 PROCEDURE — 1126F AMNT PAIN NOTED NONE PRSNT: CPT | Mod: ,,, | Performed by: NURSE PRACTITIONER

## 2024-05-22 PROCEDURE — 80053 COMPREHEN METABOLIC PANEL: CPT | Mod: ,,, | Performed by: CLINICAL MEDICAL LABORATORY

## 2024-05-22 PROCEDURE — 3288F FALL RISK ASSESSMENT DOCD: CPT | Mod: ,,, | Performed by: NURSE PRACTITIONER

## 2024-05-22 PROCEDURE — G0439 PPPS, SUBSEQ VISIT: HCPCS | Mod: ,,, | Performed by: NURSE PRACTITIONER

## 2024-05-22 PROCEDURE — 82043 UR ALBUMIN QUANTITATIVE: CPT | Mod: ,,, | Performed by: CLINICAL MEDICAL LABORATORY

## 2024-05-22 PROCEDURE — G0103 PSA SCREENING: HCPCS | Mod: ,,, | Performed by: CLINICAL MEDICAL LABORATORY

## 2024-05-22 PROCEDURE — 80061 LIPID PANEL: CPT | Mod: ,,, | Performed by: CLINICAL MEDICAL LABORATORY

## 2024-05-22 PROCEDURE — 85025 COMPLETE CBC W/AUTO DIFF WBC: CPT | Mod: ,,, | Performed by: CLINICAL MEDICAL LABORATORY

## 2024-05-22 PROCEDURE — 3078F DIAST BP <80 MM HG: CPT | Mod: ,,, | Performed by: NURSE PRACTITIONER

## 2024-05-22 PROCEDURE — 82570 ASSAY OF URINE CREATININE: CPT | Mod: ,,, | Performed by: CLINICAL MEDICAL LABORATORY

## 2024-05-22 PROCEDURE — 4010F ACE/ARB THERAPY RXD/TAKEN: CPT | Mod: ,,, | Performed by: NURSE PRACTITIONER

## 2024-05-22 RX ORDER — ONDANSETRON 4 MG/1
4 TABLET, FILM COATED ORAL EVERY 8 HOURS PRN
Qty: 30 TABLET | Refills: 1 | Status: SHIPPED | OUTPATIENT
Start: 2024-05-22

## 2024-05-22 RX ORDER — OMEPRAZOLE 20 MG/1
20 CAPSULE, DELAYED RELEASE ORAL DAILY
Qty: 90 CAPSULE | Refills: 1 | Status: SHIPPED | OUTPATIENT
Start: 2024-05-22

## 2024-05-22 RX ORDER — ATORVASTATIN CALCIUM 40 MG/1
40 TABLET, FILM COATED ORAL DAILY
Qty: 90 TABLET | Refills: 1 | Status: SHIPPED | OUTPATIENT
Start: 2024-05-22

## 2024-05-22 RX ORDER — BUSPIRONE HYDROCHLORIDE 10 MG/1
10 TABLET ORAL 2 TIMES DAILY
Qty: 180 TABLET | Refills: 1 | Status: SHIPPED | OUTPATIENT
Start: 2024-05-22

## 2024-05-22 RX ORDER — LISINOPRIL 10 MG/1
10 TABLET ORAL DAILY
Qty: 90 TABLET | Refills: 1 | Status: SHIPPED | OUTPATIENT
Start: 2024-05-22

## 2024-05-22 RX ORDER — ONDANSETRON 4 MG/1
4 TABLET, FILM COATED ORAL EVERY 8 HOURS PRN
COMMUNITY
Start: 2023-12-15 | End: 2024-05-22 | Stop reason: SDUPTHER

## 2024-05-22 NOTE — PATIENT INSTRUCTIONS
Counseling and Referral of Other Preventative  (Italic type indicates deductible and co-insurance are waived)    Patient Name: Amador Callahan  Today's Date: 5/22/2024    Health Maintenance       Date Due Completion Date    TETANUS VACCINE Never done ---    Shingles Vaccine (1 of 2) Never done ---    RSV Vaccine (Age 60+ and Pregnant patients) (1 - 1-dose 60+ series) Never done ---    COVID-19 Vaccine (4 - 2023-24 season) 09/01/2023 10/6/2021    PROSTATE-SPECIFIC ANTIGEN 09/07/2023 9/7/2022    Pneumococcal Vaccines (Age 65+) (1 of 1 - PCV) Never done ---    Abdominal Aortic Aneurysm Screening Never done ---    HIV Screening 12/15/2027 (Originally 12/11/1975) ---    Lipid Panel 12/01/2024 12/1/2023    High Dose Statin 02/13/2025 2/13/2024    Colorectal Cancer Screening 05/17/2026 5/17/2021        Orders Placed This Encounter   Procedures    Comprehensive Metabolic Panel    CBC Auto Differential    Lipid Panel    Microalbumin/Creatinine Ratio, Urine    CBC with Differential    PSA, Screening    POCT URINALYSIS W/O SCOPE       The following information is provided to all patients.  This information is to help you find resources for any of the problems found today that may be affecting your health:                  Living healthy guide: [place].gov    Understanding Diabetes: www.diabetes.org      Eating healthy: www.cdc.gov/healthyweight      CDC home safety checklist: www.cdc.gov/steadi/patient.html      Agency on Aging: [place].gov    Alcoholics anonymous (AA): www.aa.org      Physical Activity: www.vero.nih.gov/vc9qkmj      Tobacco use: [place].gov

## 2024-08-21 ENCOUNTER — OFFICE VISIT (OUTPATIENT)
Dept: FAMILY MEDICINE | Facility: CLINIC | Age: 64
End: 2024-08-21
Payer: COMMERCIAL

## 2024-08-21 VITALS
OXYGEN SATURATION: 98 % | BODY MASS INDEX: 23.3 KG/M2 | TEMPERATURE: 98 F | HEIGHT: 66 IN | SYSTOLIC BLOOD PRESSURE: 136 MMHG | HEART RATE: 79 BPM | WEIGHT: 145 LBS | RESPIRATION RATE: 18 BRPM | DIASTOLIC BLOOD PRESSURE: 71 MMHG

## 2024-08-21 DIAGNOSIS — M75.21 BICEPS TENDINITIS OF BOTH SHOULDERS: Primary | ICD-10-CM

## 2024-08-21 DIAGNOSIS — E78.5 HYPERLIPIDEMIA, UNSPECIFIED HYPERLIPIDEMIA TYPE: ICD-10-CM

## 2024-08-21 DIAGNOSIS — I10 HYPERTENSION, UNSPECIFIED TYPE: ICD-10-CM

## 2024-08-21 DIAGNOSIS — M75.22 BICEPS TENDINITIS OF BOTH SHOULDERS: Primary | ICD-10-CM

## 2024-08-21 PROCEDURE — 1125F AMNT PAIN NOTED PAIN PRSNT: CPT | Mod: ,,, | Performed by: NURSE PRACTITIONER

## 2024-08-21 PROCEDURE — 99214 OFFICE O/P EST MOD 30 MIN: CPT | Mod: 25,,, | Performed by: NURSE PRACTITIONER

## 2024-08-21 PROCEDURE — 1159F MED LIST DOCD IN RCRD: CPT | Mod: ,,, | Performed by: NURSE PRACTITIONER

## 2024-08-21 PROCEDURE — 1160F RVW MEDS BY RX/DR IN RCRD: CPT | Mod: ,,, | Performed by: NURSE PRACTITIONER

## 2024-08-21 PROCEDURE — 3288F FALL RISK ASSESSMENT DOCD: CPT | Mod: ,,, | Performed by: NURSE PRACTITIONER

## 2024-08-21 PROCEDURE — 3075F SYST BP GE 130 - 139MM HG: CPT | Mod: ,,, | Performed by: NURSE PRACTITIONER

## 2024-08-21 PROCEDURE — 3061F NEG MICROALBUMINURIA REV: CPT | Mod: ,,, | Performed by: NURSE PRACTITIONER

## 2024-08-21 PROCEDURE — 3008F BODY MASS INDEX DOCD: CPT | Mod: ,,, | Performed by: NURSE PRACTITIONER

## 2024-08-21 PROCEDURE — 4010F ACE/ARB THERAPY RXD/TAKEN: CPT | Mod: ,,, | Performed by: NURSE PRACTITIONER

## 2024-08-21 PROCEDURE — 3066F NEPHROPATHY DOC TX: CPT | Mod: ,,, | Performed by: NURSE PRACTITIONER

## 2024-08-21 PROCEDURE — 3078F DIAST BP <80 MM HG: CPT | Mod: ,,, | Performed by: NURSE PRACTITIONER

## 2024-08-21 PROCEDURE — 1101F PT FALLS ASSESS-DOCD LE1/YR: CPT | Mod: ,,, | Performed by: NURSE PRACTITIONER

## 2024-08-21 PROCEDURE — 96372 THER/PROPH/DIAG INJ SC/IM: CPT | Mod: ,,, | Performed by: NURSE PRACTITIONER

## 2024-08-21 RX ORDER — KETOROLAC TROMETHAMINE 30 MG/ML
30 INJECTION, SOLUTION INTRAMUSCULAR; INTRAVENOUS
Status: COMPLETED | OUTPATIENT
Start: 2024-08-21 | End: 2024-08-21

## 2024-08-21 RX ADMIN — KETOROLAC TROMETHAMINE 30 MG: 30 INJECTION, SOLUTION INTRAMUSCULAR; INTRAVENOUS at 01:08

## 2024-08-21 NOTE — PROGRESS NOTES
Kaya Go DNP, FNP    [address] [place] [address]     PATIENT NAME: Amador Callahan  : 1960  DATE: 24  MRN: [id number]      Billing Provider: Kaya Go DNP, FNP  Level of Service:   Patient PCP Information       Provider PCP Type    Kaya Go DNP, FNP General            Reason for Visit / Chief Complaint: Hypertension, Hyperlipidemia, Gastroesophageal Reflux, Dizziness (Complains of having a recent dizzy spell. Thinks he may have been in the heat too long.), and Shoulder Pain (Pain in right shoulder starting about one week ago.)       Update PCP  Update Chief Complaint         History of Present Illness / Problem Focused Workflow     Amador Callahan presents to the clinic with Hypertension, Hyperlipidemia, Gastroesophageal Reflux, Dizziness (Complains of having a recent dizzy spell. Thinks he may have been in the heat too long.), and Shoulder Pain (Pain in right shoulder starting about one week ago.)     Hypertension  Pertinent negatives include no chest pain, headaches, palpitations or shortness of breath.   Hyperlipidemia  Pertinent negatives include no chest pain, myalgias or shortness of breath.   Gastroesophageal Reflux  He reports no abdominal pain, no chest pain, no coughing, no nausea, no sore throat or no wheezing. Pertinent negatives include no fatigue.   Dizziness: no hearing loss, no ear pain, no fever, no headaches, no nausea, no vomiting, no weakness, no palpitations and no chest pain.  Shoulder Pain   Pertinent negatives include no fever or headaches.       Review of Systems     Review of Systems   Constitutional:  Negative for activity change, appetite change, chills, fatigue and fever.   HENT:  Negative for nasal congestion, ear pain, hearing loss, postnasal drip and sore throat.    Respiratory:  Negative for cough, chest tightness, shortness of breath and wheezing.    Cardiovascular:  Negative for chest pain,  palpitations, leg swelling and claudication.   Gastrointestinal:  Negative for abdominal pain, change in bowel habit, constipation, diarrhea, nausea and vomiting.   Genitourinary:  Negative for dysuria.   Musculoskeletal:  Negative for arthralgias, back pain, gait problem and myalgias.   Integumentary:  Negative for rash.   Neurological:  Positive for dizziness. Negative for weakness and headaches.   Psychiatric/Behavioral:  Negative for suicidal ideas. The patient is not nervous/anxious.         Medical / Social / Family History     Past Medical History:   Diagnosis Date    Adenomatous polyp of descending colon 5/17/2021    Anxiety     Diverticula, colon 5/17/2021    GERD (gastroesophageal reflux disease)     History of cerebrovascular accident     Hyperlipidemia     Hypertension     Screening for malignant neoplasm of colon 5/17/2021       Past Surgical History:   Procedure Laterality Date    EPIDURAL STEROID INJECTION INTO CERVICAL SPINE N/A 12/02/2022    Procedure: Injection-steroid-epidural-cervical, C4/5 EDE;  Surgeon: Angelic Hdz MD;  Location: [facility];  Service: Pain Management;  Laterality: N/A;    EXTERNAL EAR SURGERY Left     heart cath      MASTOID SURGERY Left 12/15/2023    middle ear and mastoid surgery       Social History  Mr. Amador Callahan  reports that he quit smoking about 44 years ago. His smoking use included cigarettes. He started smoking about 46 years ago. He has a 2 pack-year smoking history. He has been exposed to tobacco smoke. He has never used smokeless tobacco. He reports that he does not drink alcohol and does not use drugs.    Family History  Mr. Amador Callahan's family history includes Cancer in his father and sister; Heart disease in his mother.    Medications and Allergies     Medications  Outpatient Medications Marked as Taking for the 8/21/24 encounter (Office Visit) with Kaya Go, VERONIKA, FNP   Medication Sig Dispense Refill    atorvastatin (LIPITOR) 40  "MG tablet Take 1 tablet (40 mg total) by mouth once daily. 90 tablet 1    busPIRone (BUSPAR) 10 MG tablet Take 1 tablet (10 mg total) by mouth 2 (two) times a day. 180 tablet 1    lisinopriL 10 MG tablet Take 1 tablet (10 mg total) by mouth once daily. 90 tablet 1    omeprazole (PRILOSEC) 20 MG capsule Take 1 capsule (20 mg total) by mouth once daily. 90 capsule 1     Current Facility-Administered Medications for the 8/21/24 encounter (Office Visit) with Kaya oG DNP, FNP   Medication Dose Route Frequency Provider Last Rate Last Admin    [COMPLETED] ketorolac injection 30 mg  30 mg Intramuscular 1 time in Clinic/HOD Kaya Go DNP, FNP   30 mg at 08/21/24 1358       Allergies  Review of patient's allergies indicates:  No Known Allergies    Physical Examination     Vitals:    08/21/24 1340   BP: 136/71   BP Location: Right arm   Patient Position: Sitting   BP Method: Medium (Automatic)   Pulse: 79   Resp: 18   Temp: 97.5 °F (36.4 °C)   TempSrc: Oral   SpO2: 98%   Weight: 65.8 kg (145 lb)   Height: 5' 6" (1.676 m)     Physical Exam  Vitals and nursing note reviewed.   Constitutional:       General: He is not in acute distress.     Appearance: Normal appearance. He is not ill-appearing.   Eyes:      Extraocular Movements: Extraocular movements intact.      Pupils: Pupils are equal, round, and reactive to light.   Cardiovascular:      Rate and Rhythm: Normal rate and regular rhythm.      Heart sounds: Normal heart sounds.   Pulmonary:      Effort: Pulmonary effort is normal.      Breath sounds: Normal breath sounds.   Abdominal:      General: Bowel sounds are normal.      Palpations: Abdomen is soft.   Musculoskeletal:         General: Normal range of motion.      Right shoulder: Tenderness present.      Left shoulder: Tenderness present.        Arms:       Comments: TTP over bilateral bicipital tendinitis   Skin:     Findings: No rash.   Neurological:      General: No focal deficit present.      Mental " Status: He is alert and oriented to person, place, and time. Mental status is at baseline.   Psychiatric:         Mood and Affect: Mood normal.         Behavior: Behavior normal.          Assessment and Plan (including Health Maintenance)      Problem List  Smart Sets  Document Outside HM   :    Plan:         Health Maintenance Due   Topic Date Due    TETANUS VACCINE  Never done    Shingles Vaccine (1 of 2) Never done    RSV Vaccine (Age 60+ and Pregnant patients) (1 - 1-dose 60+ series) Never done    COVID-19 Vaccine (4 - 2023-24 season) 09/01/2023    Pneumococcal Vaccines (Age 65+) (1 of 1 - PCV) Never done    Abdominal Aortic Aneurysm Screening  Never done       Problem List Items Addressed This Visit          Cardiac/Vascular    Hyperlipidemia (Chronic)    Hypertension (Chronic)     Other Visit Diagnoses       Biceps tendinitis of both shoulders    -  Primary    Relevant Medications    ketorolac injection 30 mg (Completed)          Biceps tendinitis of both shoulders  -     ketorolac injection 30 mg    Hypertension, unspecified type    Hyperlipidemia, unspecified hyperlipidemia type       Health Maintenance Topics with due status: Not Due       Topic Last Completion Date    Colorectal Cancer Screening 05/17/2021    Influenza Vaccine 11/07/2023    PROSTATE-SPECIFIC ANTIGEN 05/22/2024    High Dose Statin 05/22/2024    Lipid Panel 05/22/2024         Future Appointments   Date Time Provider Department Center   5/21/2025  2:00 PM AWV NURSE, [facility] FAMILY MEDICINE [facility] [place]        Follow up if symptoms worsen or fail to improve.     Signature:  Kaya Go, VERONIKA, FNP  [address] [place] [address]  Phone #: [phone]  Fax #: [phone]    Date of encounter: 8/21/24    Patient Instructions   Rest, ice and take ibuprofen 600 mg 3 times a day for 3 days, then as needed. Follow up if symptoms persist.

## 2024-08-21 NOTE — PATIENT INSTRUCTIONS
Rest, ice and take ibuprofen 600 mg 3 times a day for 3 days, then as needed. Follow up if symptoms persist.

## 2024-08-31 ENCOUNTER — EXTERNAL CHRONIC CARE MANAGEMENT (OUTPATIENT)
Dept: FAMILY MEDICINE | Facility: CLINIC | Age: 64
End: 2024-08-31
Payer: COMMERCIAL

## 2024-08-31 PROCEDURE — G0511 CCM/BHI BY RHC/FQHC 20MIN MO: HCPCS | Mod: ,,, | Performed by: NURSE PRACTITIONER

## 2024-09-23 ENCOUNTER — OFFICE VISIT (OUTPATIENT)
Dept: FAMILY MEDICINE | Facility: CLINIC | Age: 64
End: 2024-09-23
Payer: COMMERCIAL

## 2024-09-23 VITALS
OXYGEN SATURATION: 96 % | TEMPERATURE: 98 F | BODY MASS INDEX: 23.24 KG/M2 | DIASTOLIC BLOOD PRESSURE: 83 MMHG | RESPIRATION RATE: 18 BRPM | HEART RATE: 85 BPM | WEIGHT: 144.63 LBS | HEIGHT: 66 IN | SYSTOLIC BLOOD PRESSURE: 137 MMHG

## 2024-09-23 DIAGNOSIS — M75.101 NONTRAUMATIC TEAR OF RIGHT SUPRASPINATUS TENDON: Primary | ICD-10-CM

## 2024-09-23 DIAGNOSIS — Z23 FLU VACCINE NEED: ICD-10-CM

## 2024-09-23 PROCEDURE — 4010F ACE/ARB THERAPY RXD/TAKEN: CPT | Mod: ,,, | Performed by: NURSE PRACTITIONER

## 2024-09-23 PROCEDURE — 1101F PT FALLS ASSESS-DOCD LE1/YR: CPT | Mod: ,,, | Performed by: NURSE PRACTITIONER

## 2024-09-23 PROCEDURE — 3061F NEG MICROALBUMINURIA REV: CPT | Mod: ,,, | Performed by: NURSE PRACTITIONER

## 2024-09-23 PROCEDURE — 90653 IIV ADJUVANT VACCINE IM: CPT | Mod: ,,, | Performed by: NURSE PRACTITIONER

## 2024-09-23 PROCEDURE — 1160F RVW MEDS BY RX/DR IN RCRD: CPT | Mod: ,,, | Performed by: NURSE PRACTITIONER

## 2024-09-23 PROCEDURE — 3288F FALL RISK ASSESSMENT DOCD: CPT | Mod: ,,, | Performed by: NURSE PRACTITIONER

## 2024-09-23 PROCEDURE — 3008F BODY MASS INDEX DOCD: CPT | Mod: ,,, | Performed by: NURSE PRACTITIONER

## 2024-09-23 PROCEDURE — 3075F SYST BP GE 130 - 139MM HG: CPT | Mod: ,,, | Performed by: NURSE PRACTITIONER

## 2024-09-23 PROCEDURE — 3066F NEPHROPATHY DOC TX: CPT | Mod: ,,, | Performed by: NURSE PRACTITIONER

## 2024-09-23 PROCEDURE — 1125F AMNT PAIN NOTED PAIN PRSNT: CPT | Mod: ,,, | Performed by: NURSE PRACTITIONER

## 2024-09-23 PROCEDURE — 1159F MED LIST DOCD IN RCRD: CPT | Mod: ,,, | Performed by: NURSE PRACTITIONER

## 2024-09-23 PROCEDURE — 99214 OFFICE O/P EST MOD 30 MIN: CPT | Mod: 25,,, | Performed by: NURSE PRACTITIONER

## 2024-09-23 PROCEDURE — 3079F DIAST BP 80-89 MM HG: CPT | Mod: ,,, | Performed by: NURSE PRACTITIONER

## 2024-09-23 PROCEDURE — G0008 ADMIN INFLUENZA VIRUS VAC: HCPCS | Mod: ,,, | Performed by: NURSE PRACTITIONER

## 2024-09-23 RX ORDER — CYCLOBENZAPRINE HCL 10 MG
10 TABLET ORAL 2 TIMES DAILY PRN
COMMUNITY
Start: 2024-09-01

## 2024-09-23 NOTE — PROGRESS NOTES
"   Kaya Go DNP, FNP    [address] [place] [address]     PATIENT NAME: Amador Callahan  : 1960  DATE: 24  MRN: [id number]      Billing Provider: Kaya Go DNP, FNP  Level of Service:   Patient PCP Information       Provider PCP Type    Kaya Go DNP, FNP General            Reason for Visit / Chief Complaint: Shoulder Pain (Right shoulder pain that has "been going on a while.")       Update PCP  Update Chief Complaint         History of Present Illness / Problem Focused Workflow     Amador Callahan presents to the clinic with Shoulder Pain (Right shoulder pain that has "been going on a while.")     Shoulder Pain   Pertinent negatives include no fever or headaches.       Review of Systems     Review of Systems   Constitutional:  Negative for activity change, appetite change, chills, fatigue and fever.   HENT:  Negative for nasal congestion, ear pain, hearing loss, postnasal drip and sore throat.    Respiratory:  Negative for cough, chest tightness, shortness of breath and wheezing.    Cardiovascular:  Negative for chest pain, palpitations, leg swelling and claudication.   Gastrointestinal:  Negative for abdominal pain, change in bowel habit, constipation, diarrhea, nausea and vomiting.   Genitourinary:  Negative for dysuria.   Musculoskeletal:  Positive for arthralgias and myalgias. Negative for back pain and gait problem.   Integumentary:  Negative for rash.   Neurological:  Negative for weakness and headaches.   Psychiatric/Behavioral:  Negative for suicidal ideas. The patient is not nervous/anxious.         Medical / Social / Family History     Past Medical History:   Diagnosis Date    Adenomatous polyp of descending colon 2021    Anxiety     Diverticula, colon 2021    GERD (gastroesophageal reflux disease)     History of cerebrovascular accident     Hyperlipidemia     Hypertension     Screening for malignant neoplasm of colon " 5/17/2021       Past Surgical History:   Procedure Laterality Date    EPIDURAL STEROID INJECTION INTO CERVICAL SPINE N/A 12/02/2022    Procedure: Injection-steroid-epidural-cervical, C4/5 EDE;  Surgeon: Angelic Hdz MD;  Location: [facility];  Service: Pain Management;  Laterality: N/A;    EXTERNAL EAR SURGERY Left     heart cath      MASTOID SURGERY Left 12/15/2023    middle ear and mastoid surgery       Social History  Mr. Amador Callahan  reports that he quit smoking about 44 years ago. His smoking use included cigarettes. He started smoking about 46 years ago. He has a 2 pack-year smoking history. He has been exposed to tobacco smoke. He has never used smokeless tobacco. He reports that he does not drink alcohol and does not use drugs.    Family History  Mr. Amador Callahan's family history includes Cancer in his father and sister; Heart disease in his mother.    Medications and Allergies     Medications  Outpatient Medications Marked as Taking for the 9/23/24 encounter (Office Visit) with Kaya Go DNP, FNP   Medication Sig Dispense Refill    atorvastatin (LIPITOR) 40 MG tablet Take 1 tablet (40 mg total) by mouth once daily. 90 tablet 1    busPIRone (BUSPAR) 10 MG tablet Take 1 tablet (10 mg total) by mouth 2 (two) times a day. 180 tablet 1    cyclobenzaprine (FLEXERIL) 10 MG tablet Take 10 mg by mouth 2 (two) times daily as needed.      lisinopriL 10 MG tablet Take 1 tablet (10 mg total) by mouth once daily. 90 tablet 1    omeprazole (PRILOSEC) 20 MG capsule Take 1 capsule (20 mg total) by mouth once daily. 90 capsule 1    ondansetron (ZOFRAN) 4 MG tablet Take 1 tablet (4 mg total) by mouth every 8 (eight) hours as needed for Nausea. 30 tablet 1     Current Facility-Administered Medications for the 9/23/24 encounter (Office Visit) with Kaya Go DNP, FNP   Medication Dose Route Frequency Provider Last Rate Last Admin    [COMPLETED] influenza (adjuvanted) (Fluad) 45 mcg/0.5 mL IM  "vaccine (> or = 66 yo) 0.5 mL  0.5 mL Intramuscular 1 time in Clinic/HOD Kaya Go, DNP, FNP   0.5 mL at 09/23/24 1414       Allergies  Review of patient's allergies indicates:  No Known Allergies    Physical Examination     Vitals:    09/23/24 1412   BP: 137/83   BP Location: Left arm   Patient Position: Sitting   BP Method: Medium (Automatic)   Pulse: 85   Resp: 18   Temp: 97.6 °F (36.4 °C)   TempSrc: Oral   SpO2: 96%   Weight: 65.6 kg (144 lb 9.6 oz)   Height: 5' 6" (1.676 m)     Physical Exam  Vitals and nursing note reviewed.   Constitutional:       General: He is not in acute distress.     Appearance: Normal appearance. He is normal weight. He is not ill-appearing.   HENT:      Mouth/Throat:      Mouth: Mucous membranes are moist.   Eyes:      Extraocular Movements: Extraocular movements intact.      Pupils: Pupils are equal, round, and reactive to light.   Cardiovascular:      Rate and Rhythm: Normal rate and regular rhythm.      Pulses: Normal pulses.      Heart sounds: Normal heart sounds. No murmur heard.  Pulmonary:      Effort: Pulmonary effort is normal. No respiratory distress.      Breath sounds: Normal breath sounds. No wheezing, rhonchi or rales.   Chest:      Chest wall: No tenderness.   Abdominal:      General: Bowel sounds are normal. There is no distension.      Palpations: Abdomen is soft.      Tenderness: There is no abdominal tenderness. There is no right CVA tenderness, left CVA tenderness, guarding or rebound.   Musculoskeletal:         General: No swelling. Normal range of motion.      Right shoulder: Tenderness present. Normal strength. Normal pulse.        Arms:       Cervical back: Normal range of motion and neck supple.      Right lower leg: No edema.      Left lower leg: No edema.   Skin:     General: Skin is warm and dry.      Findings: No rash.   Neurological:      General: No focal deficit present.      Mental Status: He is alert and oriented to person, place, and time. Mental " status is at baseline.   Psychiatric:         Mood and Affect: Mood normal.         Behavior: Behavior normal.         Thought Content: Thought content normal.         Judgment: Judgment normal.          Assessment and Plan (including Health Maintenance)      Problem List  Smart Sets  Document Outside HM   :    Plan:         Health Maintenance Due   Topic Date Due    TETANUS VACCINE  Never done    Shingles Vaccine (1 of 2) Never done    RSV Vaccine (Age 60+ and Pregnant patients) (1 - 1-dose 60+ series) Never done    Pneumococcal Vaccines (Age 65+) (1 of 1 - PCV) Never done    Abdominal Aortic Aneurysm Screening  Never done    COVID-19 Vaccine (4 - 2023-24 season) 09/01/2024       Problem List Items Addressed This Visit    None  Visit Diagnoses       Nontraumatic tear of right supraspinatus tendon    -  Primary    Relevant Orders    Ambulatory referral/consult to Orthopedics    Flu vaccine need        Relevant Medications    influenza (adjuvanted) (Fluad) 45 mcg/0.5 mL IM vaccine (> or = 64 yo) 0.5 mL (Completed)          Nontraumatic tear of right supraspinatus tendon  -     Ambulatory referral/consult to Orthopedics; Future; Expected date: 09/23/2024    Flu vaccine need  -     influenza (adjuvanted) (Fluad) 45 mcg/0.5 mL IM vaccine (> or = 64 yo) 0.5 mL       Health Maintenance Topics with due status: Not Due       Topic Last Completion Date    Colorectal Cancer Screening 05/17/2021    PROSTATE-SPECIFIC ANTIGEN 05/22/2024    Lipid Panel 05/22/2024    High Dose Statin 08/21/2024         Future Appointments   Date Time Provider Department Center   5/21/2025  2:00 PM AWV NURSE, [facility] FAMILY MEDICINE [facility] [place]        Follow up if symptoms worsen or fail to improve.     Signature:  Kaya Go, VERONIKA, FNP  [address] [place] [address]  Phone #: [phone]  Fax #: [phone]    Date of encounter: 9/23/24    Patient Instructions   Will refer to ortho  for evaluation. Ice to affected area. Continue NSAIDS and muscle relaxers as needed.

## 2024-09-23 NOTE — PATIENT INSTRUCTIONS
Will refer to ortho for evaluation. Ice to affected area. Continue NSAIDS and muscle relaxers as needed.

## 2024-09-30 ENCOUNTER — EXTERNAL CHRONIC CARE MANAGEMENT (OUTPATIENT)
Dept: FAMILY MEDICINE | Facility: CLINIC | Age: 64
End: 2024-09-30
Payer: COMMERCIAL

## 2024-09-30 DIAGNOSIS — M25.511 RIGHT SHOULDER PAIN, UNSPECIFIED CHRONICITY: Primary | ICD-10-CM

## 2024-09-30 PROCEDURE — G0511 CCM/BHI BY RHC/FQHC 20MIN MO: HCPCS | Mod: ,,, | Performed by: NURSE PRACTITIONER

## 2024-10-01 ENCOUNTER — HOSPITAL ENCOUNTER (OUTPATIENT)
Dept: RADIOLOGY | Facility: HOSPITAL | Age: 64
Discharge: HOME | End: 2024-10-01
Attending: ORTHOPAEDIC SURGERY
Payer: COMMERCIAL

## 2024-10-01 ENCOUNTER — OFFICE VISIT (OUTPATIENT)
Dept: ORTHOPEDICS | Facility: CLINIC | Age: 64
End: 2024-10-01
Payer: COMMERCIAL

## 2024-10-01 DIAGNOSIS — M25.511 RIGHT SHOULDER PAIN, UNSPECIFIED CHRONICITY: ICD-10-CM

## 2024-10-01 DIAGNOSIS — M75.101 NONTRAUMATIC TEAR OF RIGHT SUPRASPINATUS TENDON: Primary | ICD-10-CM

## 2024-10-01 PROCEDURE — 99999 PR PBB SHADOW E&M-EST. PATIENT-LVL III: CPT | Mod: PBBFAC,,, | Performed by: ORTHOPAEDIC SURGERY

## 2024-10-01 PROCEDURE — 20610 DRAIN/INJ JOINT/BURSA W/O US: CPT | Mod: PBBFAC | Performed by: ORTHOPAEDIC SURGERY

## 2024-10-01 PROCEDURE — 73030 X-RAY EXAM OF SHOULDER: CPT | Mod: TC,RT

## 2024-10-01 PROCEDURE — 99213 OFFICE O/P EST LOW 20 MIN: CPT | Mod: PBBFAC,25 | Performed by: ORTHOPAEDIC SURGERY

## 2024-10-01 PROCEDURE — 73030 X-RAY EXAM OF SHOULDER: CPT | Mod: 26,RT,, | Performed by: ORTHOPAEDIC SURGERY

## 2024-10-01 PROCEDURE — 99999PBSHW PR PBB SHADOW TECHNICAL ONLY FILED TO HB: Mod: PBBFAC,,,

## 2024-10-01 RX ORDER — TRIAMCINOLONE ACETONIDE 40 MG/ML
80 INJECTION, SUSPENSION INTRA-ARTICULAR; INTRAMUSCULAR
Status: DISCONTINUED | OUTPATIENT
Start: 2024-10-01 | End: 2024-10-01 | Stop reason: HOSPADM

## 2024-10-01 RX ADMIN — TRIAMCINOLONE ACETONIDE 80 MG: 400 INJECTION, SUSPENSION INTRA-ARTICULAR; INTRAMUSCULAR at 11:10

## 2024-10-01 NOTE — PROGRESS NOTES
HPI:   Amador Callahan is a pleasant 63 y.o. patient who reports to clinic for evaluation of right shoulder pain.     Injury onset and description: Pain has been present for several months. No specific injury  Patient's occupation:   This is not a work related injury.   he has not had formal physical therapy  he has not had previous shoulder injections.   he has had advanced imaging such as MRI. 7/2022  He reports he had no surgery after this MRI and his shoulder got better.   The shoulder pain worsens with activity and overhead motion. Pain is disruptive to sleep at night.   The pain is better with rest. Treatment thus far has included rest and activity modification.   Here today to discuss diagnosis and treatment options.   VAS Pain Scale:  6  SANE Score: 60/D    PAST MEDICAL HISTORY:   Past Medical History:   Diagnosis Date    Adenomatous polyp of descending colon 5/17/2021    Anxiety     Diverticula, colon 5/17/2021    GERD (gastroesophageal reflux disease)     History of cerebrovascular accident     Hyperlipidemia     Hypertension     Screening for malignant neoplasm of colon 5/17/2021     PAST SURGICAL HISTORY:   Past Surgical History:   Procedure Laterality Date    EPIDURAL STEROID INJECTION INTO CERVICAL SPINE N/A 12/02/2022    Procedure: Injection-steroid-epidural-cervical, C4/5 EDE;  Surgeon: Angelic Hdz MD;  Location: [facility];  Service: Pain Management;  Laterality: N/A;    EXTERNAL EAR SURGERY Left     heart cath      MASTOID SURGERY Left 12/15/2023    middle ear and mastoid surgery     MEDICATIONS:    Current Outpatient Medications:     atorvastatin (LIPITOR) 40 MG tablet, Take 1 tablet (40 mg total) by mouth once daily., Disp: 90 tablet, Rfl: 1    busPIRone (BUSPAR) 10 MG tablet, Take 1 tablet (10 mg total) by mouth 2 (two) times a day., Disp: 180 tablet, Rfl: 1    cyclobenzaprine (FLEXERIL) 10 MG tablet, Take 10 mg by mouth 2 (two) times daily as needed., Disp: , Rfl:      lisinopriL 10 MG tablet, Take 1 tablet (10 mg total) by mouth once daily., Disp: 90 tablet, Rfl: 1    omeprazole (PRILOSEC) 20 MG capsule, Take 1 capsule (20 mg total) by mouth once daily., Disp: 90 capsule, Rfl: 1    ondansetron (ZOFRAN) 4 MG tablet, Take 1 tablet (4 mg total) by mouth every 8 (eight) hours as needed for Nausea., Disp: 30 tablet, Rfl: 1  ALLERGIES:   Review of patient's allergies indicates:  No Known Allergies  REVIEW OF SYSTEMS:  Constitution: Negative. Negative for chills, fever and night sweats. HENT: Negative for congestion and headaches.  Eyes: Negative for blurred vision, left vision loss and right vision loss. Cardiovascular: Negative for chest pain and syncope. Respiratory: Negative for cough and shortness of breath.       PHYSICAL EXAM:  VITAL SIGNS: There were no vitals taken for this visit.  General: Well-developed well-nourished 63 y.o. malein no acute distress;Cardiovascular: Regular rhythm by palpation of distal pulse, normal color and temperature, no concerning varicosities on symptomatic side Lungs: No labored breathing or wheezing appreciated Neuro: Alert and oriented ×3 Psychiatric: well oriented to person, place and time, demonstrates normal mood and affect Skin: No rashes, lesions or ulcers, normal temperature, turgor, and texture on uninvolved extremity    General    Nursing note and vitals reviewed.  Constitutional: He is oriented to person, place, and time. He appears well-developed and well-nourished. No distress.   HENT:   Head: Normocephalic and atraumatic.   Eyes: Pupils are equal, round, and reactive to light.   Neck: Neck supple.   Cardiovascular:  Normal rate and intact distal pulses.            Pulmonary/Chest: Breath sounds normal. No respiratory distress. He exhibits no tenderness.   Abdominal: Soft. Bowel sounds are normal.   Neurological: He is alert and oriented to person, place, and time. He has normal reflexes. He displays normal reflexes. No cranial nerve  deficit. He exhibits normal muscle tone.   Psychiatric: He has a normal mood and affect. His behavior is normal. Judgment and thought content normal.         Right Shoulder Exam     Inspection/Observation   Swelling: absent  Bruising: absent  Scapular Dyskinesia: positive    Tenderness   The patient is tender to palpation of the greater tuberosity, acromion and acromioclavicular joint.    Range of Motion   Active abduction:  abnormal   Passive abduction:  abnormal   Forward Flexion:  abnormal   Forward Elevation: abnormal  External Rotation 90 degrees: normal    Tests & Signs   Apprehension: negative  Cross arm: positive  Drop arm: negative  Hensley test: positive  Impingement: positive  Rotator Cuff Painful Arc/Range: mild  Lag Sign 90 degrees: negative  Lift Off Sign: positive  Belly Press: positive  Active Compression Test (Smyth's Sign): positive  Jerk Test: negative    Other   Sensation: normal    Comments:  Pain with dynamic labral shear test.  There is pain and weakness with Whipple testing.     Left Shoulder Exam   Left shoulder exam is normal.       Muscle Strength   Right Upper Extremity   Shoulder External Rotation: 4/5   Supraspinatus: 4/5   Subscapularis: 4/5   Biceps: 4/5     Reflexes     Right Side   Biceps:  2+  Right Vidales's Sign:  absent    Vascular Exam     Right Pulses      Radial:                    2+      Capillary Refill  Right Hand: normal capillary refill            IMAGING:  X-ray Shoulder 2 or More Views Right    Result Date: 10/1/2024  See Procedure Notes for results. IMPRESSION: Please see Ortho procedure notes for report.  This procedure was auto-finalized by: Virtual Radiologist  Three views right shoulder demonstrate a type 1 acromion there is a slight irregularity seen along the greater tuberosity which may represent a age indeterminate cyst.  Glenohumeral joint appears well-preserved.      MRI from 2 years prior was reviewed demonstrating no discernible full-thickness rotator  cuff tear.      ASSESSMENT:      ICD-10-CM ICD-9-CM   1. Nontraumatic tear of right supraspinatus tendon  M75.101 [id number]       PLAN:     -Findings and treatment options were discussed with the patient  -All questions answered  Recommend physical therapy and an injection.  Please see the attached procedure note.  Prior MRI was reviewed.  He voices understanding with the treatment plan.  See back in 6-8 weeks may consider repeat MRI at that time.    There are no Patient Instructions on file for this visit.  Orders Placed This Encounter   Procedures    Large Joint Aspiration/Injection: R subacromial bursa     This order was created via procedure documentation    Ambulatory referral/consult to Physical/Occupational Therapy     Standing Status:   Future     Standing Expiration Date:   11/1/2025     Referral Priority:   Routine     Referral Type:   Physical Medicine     Referral Reason:   Specialty Services Required     Requested Specialty:   Physical Therapy     Number of Visits Requested:   1     Large Joint Aspiration/Injection: R subacromial bursa    Date/Time: 10/1/2024 11:15 AM    Performed by: Olu Vaughn MD  Authorized by: Olu Vaughn MD    Consent Done?:  Yes (Verbal)  Indications:  Pain  Site marked: the procedure site was marked    Local anesthetic:  Bupivacaine 0.25% without epinephrine (4 cc's of 0.25% bupivicaine)    Details:  Needle Size:  22 G  Approach:  Posterior  Location:  Shoulder  Site:  R subacromial bursa  Medications:  80 mg triamcinolone acetonide 40 mg/mL  Patient tolerance:  Patient tolerated the procedure well with no immediate complications

## 2024-10-08 ENCOUNTER — CLINICAL SUPPORT (OUTPATIENT)
Dept: REHABILITATION | Facility: HOSPITAL | Age: 64
End: 2024-10-08
Payer: COMMERCIAL

## 2024-10-08 DIAGNOSIS — M25.611 DECREASED ROM OF RIGHT SHOULDER: Primary | ICD-10-CM

## 2024-10-08 DIAGNOSIS — M75.101 NONTRAUMATIC TEAR OF RIGHT SUPRASPINATUS TENDON: ICD-10-CM

## 2024-10-08 PROCEDURE — 97161 PT EVAL LOW COMPLEX 20 MIN: CPT | Mod: PN

## 2024-10-08 PROCEDURE — 97110 THERAPEUTIC EXERCISES: CPT | Mod: PN

## 2024-10-08 NOTE — PLAN OF CARE
[facility]   Physical Therapy Initial Evaluation    Date: 10/8/2024   Name: Amador Callahan  Clinic Number: [id number]    Therapy Diagnosis:   Encounter Diagnosis   Name Primary?    Nontraumatic tear of right supraspinatus tendon      Physician: Olu Vaughn MD    Physician Orders: PT Eval and Treat    Medical Diagnosis from Referral: right suprapsinatous nontraumatic tear, right shoulder pain   Evaluation Date: 10/8/2024  Updated Plan of Care Due : 12/8/2024   Authorization Period Expiration: [employer]   Plan of Care Expiration: 12/8/2024   Visit # / Visits authorized: 1/ 15    Time In: 1400  Time Out: 1445   Total Appointment Time (timed & untimed codes): 45  minutes    Precautions: Standard    Subjective   Date of onset: pt states he has been having pretty bad pain for about half a month . Pt is right hand dominant. Pt has hx of cva . Pt voices he has severe pain with simple tasks such shaving.  Pt voices he has severe pain with lifting his arm up to dress. Pt voices pain is 7/10 with all movements . Pt states he is unable to do yardwork with his shoulder in this shape.   History of current condition - Amador reports: hx below      Medical History:   Past Medical History:   Diagnosis Date    Adenomatous polyp of descending colon 5/17/2021    Anxiety     Diverticula, colon 5/17/2021    GERD (gastroesophageal reflux disease)     History of cerebrovascular accident     Hyperlipidemia     Hypertension     Screening for malignant neoplasm of colon 5/17/2021       Surgical History:   Amador Callahan  has a past surgical history that includes heart cath; Epidural steroid injection into cervical spine (N/A, 12/02/2022); Mastoid surgery (Left, 12/15/2023); and External ear surgery (Left).    Medications:   Amadro has a current medication list which includes the following prescription(s): atorvastatin, buspirone, cyclobenzaprine, lisinopril, omeprazole, and ondansetron.    Allergies:   Review of patient's  allergies indicates:  No Known Allergies     Imaging, MRI studies:  old images show complete tear of suprspinatous     Prior Therapy: none   Social History:   lives alone  Occupation: retired   Prior Level of Function: independent   Current Level of Function: right shoulder pain with movement     Pain:  Current 7/10, worst 10/10, best 4/10   Location: right generalized       Description: Aching, Dull, Grabbing, Tight, and Deep  Aggravating Factors: Night Time, Flexing, Lifting, and Getting out of bed/chair  Easing Factors: nothing    Patients goals: be able to use his right shoulder and not have to have sx     Objective       Observation :     Forward Head/Rounded Shoulders :  [x] Yes   [] No   Scapular Winging :  [x] Yes   [] No   Incision :        Comments :     Cervical Spine Clearing :wnl         Range of Motion/Strength :                  Left Extremity                                                                        Right Extremity     AROM   PROM  Strength                  Location   AROM    PROM   Strength    140   4   Shoulder    Flexion 85 95 2+   150  4                      Abduction   80 95 2+                                                  ER in Adduction                              IR in Adduction      t6  4                      Functional IR Greater troch  2+   90  4                      ER in 90 Scaption 80  3                           IR in 90 Scaption      140  4   Elbow         Flexion 140  4   0  4                      Extension 0  4                           Pron/Supination          Functional Impairments :       Clinical Tests :     Ligamentous Stability :    Rotator Cuff : weak rotator cuff in all planes     Labrum :    Elbow :     Other :    Palpation :  forward head , tight pecs , tight upper trap                      Limitation/Restriction for FOTO shoulder  Survey    Therapist reviewed FOTO scores for Amador Callahan on 10/8/2024.   FOTO documents entered into EPIC - see Media  section.    Limitation Score: 45%         TREATMENT       Amador received the treatments listed below:  THERAPEUTIC EXERCISES to develop strength, endurance, ROM, flexibility, and posture for 20  minutes including home ex program         Home Exercises and Patient Education Provided    Education provided:   - range of motion and home ex program     Written Home Exercises Provided: Patient instructed to cont prior HEP.  Exercises were reviewed and Amador was able to demonstrate them prior to the end of the session.  Amador demonstrated good  understanding of the education provided.     See EMR under Patient Instructions for exercises provided 10/8/2024.    Assessment   Amador is a 63 y.o. male referred to outpatient Physical Therapy with a medical diagnosis of right supraspinatous tear , right shoulder pain . Patient presents with decreased range of motion right shoulder , pain in all planes of movement , dominant shoulder weakness with decrease range of motion and pain with movement     Patient prognosis is Good.   Patientt will benefit from skilled outpatient Physical Therapy to address the deficits stated above and in the chart below, provide patient /family education, and to maximize patientt's level of independence.     Plan of care discussed with patient: Yes  Patient's spiritual, cultural and educational needs considered and patient is agreeable to the plan of care and goals as stated below:     Anticipated Barriers for therapy:  medical diagnosis of right supraspinatous tear , right shoulder pain . Patient presents with decreased range of motion right shoulder , pain in all planes of movement , dominant shoulder weakness with decrease range of motion and pain with movement     Goals:  Short Term Goals: 4 weeks   Pt will be independent with home ex program   Pt will be able to increase arom to 125 degrees flexion and abd   Be able to reach into cabinet pain free   Decrease worse pain to 5/10     Long Term Goals: 8   weeks   Be able to sleep at night without awaking with shoulder pain   Be able to increase shoulder strength to 4/5   Be able to increase shoulder range of motion to 160 degrees flexion and abd     Plan   Plan of care Certification: 10/8/2024 to 12/8/2024 .    Outpatient Physical Therapy 2 times weekly for 8 weeks to include the following interventions: Manual Therapy, Neuromuscular Re-ed, Patient Education, Therapeutic Activities, Therapeutic Exercise, and modalities as needed  .     Plan of care has been reestablished with Patsy WILDER, Hoa Blackmon, PT

## 2024-10-11 ENCOUNTER — CLINICAL SUPPORT (OUTPATIENT)
Dept: REHABILITATION | Facility: HOSPITAL | Age: 64
End: 2024-10-11
Payer: COMMERCIAL

## 2024-10-11 DIAGNOSIS — Z74.09 DECREASED FUNCTIONAL MOBILITY AND ENDURANCE: ICD-10-CM

## 2024-10-11 DIAGNOSIS — M25.611 DECREASED ROM OF RIGHT SHOULDER: Primary | ICD-10-CM

## 2024-10-11 DIAGNOSIS — M25.612 DECREASED ROM OF LEFT SHOULDER: ICD-10-CM

## 2024-10-11 PROCEDURE — 97112 NEUROMUSCULAR REEDUCATION: CPT | Mod: PN

## 2024-10-11 PROCEDURE — 97140 MANUAL THERAPY 1/> REGIONS: CPT | Mod: PN

## 2024-10-11 PROCEDURE — 97530 THERAPEUTIC ACTIVITIES: CPT | Mod: PN

## 2024-10-11 NOTE — PROGRESS NOTES
Physical Therapy Treatment Note     Name: Amador Callahan  Clinic Number: [id number]    Therapy Diagnosis: No diagnosis found.  Physician: Olu Vaughn MD    Visit Date: 10/11/2024   Physician Orders: PT Eval and Treat    Medical Diagnosis from Referral: right suprapsinatous nontraumatic tear, right shoulder pain   Evaluation Date: 10/8/2024  Updated Plan of Care Due : 12/8/2024   Authorization Period Expiration: [employer]   Plan of Care Expiration: 12/8/2024   Visit # / Visits authorized: 2/ 15  PTA Visit #: 0    Time In: 1006  Time Out: 1051  Total Billable Time: 45 minutes    Precautions: Standard       Subjective     Pt reports: pt voices he is doing a little better today .  He was compliant with home exercise program.  Response to previous treatment: improved range of motion        Pain: 4/10  Location: right shoulder         Objective       Amador Callahan received the following manual therapy techniques: Joint mobilizations were applied to the: scapula and gleno humeral joint  for 15  minutes, including:  Joint mobs left inferior glides and slides x 5 min   Scapular mobility pnf patterns in both planes x 5 min   Prom to shoulder in all plane stretching to end range x 5 min      Amdaor Callahan participated in neuromuscular re-education activities to improve: stretching range of motion to activate muscle length to increase range of motion  for 15 minutes. The following activities were included:   Upper body ergometer x 5' (2.5' forward and 2.5' backwards)  Pulleys with stretch at end range pain free x 5 min   Scapular retraction blue x 10   corner stretch  x 10 reps    Amador Callahan performed therapeutic activities to promote functional performance for 15  to include:  Dowel flexion stretching to end range x 15 to promote reaching into cabinet    Red theraband  reaching to front  flexion x 20 reps   Red theraband reaching behind her back  x 20 reps     Red theraband external rotation x 20 reps reaching  out to side similation of reaching into back seat  Red theraband internal rotation x 20 reps  improving reaching such as tucking in pants       Right shoulder range of motion   flexion  133/160                                                       Abd  115 /155                                                       ER   90/95    Home Exercises Provided and Patient Education Provided     Education provided: pt to cont home ex program     Written Home Exercises Provided: Patient instructed to cont prior HEP.  Exercises were reviewed and Amador was able to demonstrate them prior to the end of the session.  Amador demonstrated good  understanding of the education provided.     See EMR under Patient Instructions for exercises provided 10/11/2024.    Assessment     Pt improving with range of motion   Amador Is progressing well towards his goals.   Pt prognosis is Excellent.     Pt will continue to benefit from skilled outpatient physical therapy to address the deficits listed in the problem list box on initial evaluation, provide pt/family education and to maximize pt's level of independence in the home and community environment.     Pt's spiritual, cultural and educational needs considered and pt agreeable to plan of care and goals.     Anticipated Barriers for therapy:  medical diagnosis of right supraspinatous tear , right shoulder pain . Patient presents with decreased range of motion right shoulder , pain in all planes of movement , dominant shoulder weakness with decrease range of motion and pain with movement      Goals:  Short Term Goals: 4 weeks   Pt will be independent with home ex program   Pt will be able to increase arom to 125 degrees flexion and abd   Be able to reach into cabinet pain free   Decrease worse pain to 5/10      Long Term Goals: 8  weeks   Be able to sleep at night without awaking with shoulder pain   Be able to increase shoulder strength to 4/5   Be able to increase shoulder range of motion to 160  degrees flexion and abd      Plan   Plan of care Certification: 10/8/2024 to 12/8/2024 .     Outpatient Physical Therapy 2 times weekly for 8 weeks to include the following interventions: Manual Therapy, Neuromuscular Re-ed, Patient Education, Therapeutic Activities, Therapeutic Exercise, and modalities as needed  .      Plan of care has been reestablished with Patsy WILDER, Hoa Blackmon, PT  10/11/2024

## 2024-10-15 ENCOUNTER — CLINICAL SUPPORT (OUTPATIENT)
Dept: REHABILITATION | Facility: HOSPITAL | Age: 64
End: 2024-10-15
Payer: COMMERCIAL

## 2024-10-15 DIAGNOSIS — M75.101 NONTRAUMATIC TEAR OF RIGHT SUPRASPINATUS TENDON: ICD-10-CM

## 2024-10-15 DIAGNOSIS — M25.611 DECREASED ROM OF RIGHT SHOULDER: Primary | ICD-10-CM

## 2024-10-15 PROCEDURE — 97112 NEUROMUSCULAR REEDUCATION: CPT | Mod: PN,CQ

## 2024-10-15 PROCEDURE — 97140 MANUAL THERAPY 1/> REGIONS: CPT | Mod: PN,CQ

## 2024-10-15 PROCEDURE — 97530 THERAPEUTIC ACTIVITIES: CPT | Mod: PN,CQ

## 2024-10-15 NOTE — PROGRESS NOTES
Physical Therapy Treatment Note     Name: Amador Callahan  Clinic Number: [id number]    Therapy Diagnosis:   Encounter Diagnoses   Name Primary?    Decreased ROM of right shoulder Yes    Nontraumatic tear of right supraspinatus tendon      Physician: Olu Vaughn MD    Visit Date: 10/15/2024   Physician Orders: PT Eval and Treat    Medical Diagnosis from Referral: right suprapsinatous nontraumatic tear, right shoulder pain   Evaluation Date: 10/8/2024  Updated Plan of Care Due : 12/8/2024   Authorization Period Expiration: [employer]   Plan of Care Expiration: 12/8/2024   Visit # / Visits authorized: 3/ 15  PTA Visit #: 1    Time In: 1230  Time Out: 110  Total Billable Time: 40 minutes    Precautions: Standard       Subjective     Pt reports: I've been a little sore  He was compliant with home exercise program.  Response to previous treatment: improved range of motion      Pain: 5/10  Location: right shoulder         Objective       Amador Callahan received the following manual therapy techniques: Joint mobilizations were applied to the: scapula and gleno humeral joint  for 10  minutes, including:  Joint mobs left inferior glides and slides x 5 min   Scapular mobility pnf patterns in both planes x 5 min   Prom to shoulder in all plane stretching to end range x 5 min      Amador Callahan participated in neuromuscular re-education activities to improve: stretching range of motion to activate muscle length to increase range of motion  for 15 minutes. The following activities were included:   Upper body ergometer x 5' (2.5' forward and 2.5' backwards)  Pulleys with stretch at end range pain free x 5 min   Scapular retraction blue x 10  Prone pulls, extension and horizontal abduction x 10       Amador Callahan performed therapeutic activities to promote functional performance for 15  to include:  Dowel flexion stretching to end range x 15 to promote reaching into cabinet    Red theraband  reaching to front  flexion x  20 reps   Red theraband reaching behind her back  x 20 reps     Red theraband external rotation x 20 reps reaching out to side similation of reaching into back seat  Red theraband internal rotation x 20 reps  improving reaching such as tucking in pants   Bicep curls 15 x 3# to simulate lifting grocery bags  Supine external rotation 10 x 1# to promote combing hair      Right shoulder range of motion   flexion  130/160                                                       Abd  110 /155                                                       ER   86/90    Home Exercises Provided and Patient Education Provided     Education provided: pt to cont home ex program     Written Home Exercises Provided: Patient instructed to cont prior HEP.  Exercises were reviewed and Amador was able to demonstrate them prior to the end of the session.  Amador demonstrated good  understanding of the education provided.     See EMR under Patient Instructions for exercises provided 10/11/2024.    Assessment     Pt progressing with posterior cuff strengthening.  Amador Is progressing well towards his goals.   Pt prognosis is Excellent.     Pt will continue to benefit from skilled outpatient physical therapy to address the deficits listed in the problem list box on initial evaluation, provide pt/family education and to maximize pt's level of independence in the home and community environment.     Pt's spiritual, cultural and educational needs considered and pt agreeable to plan of care and goals.     Anticipated Barriers for therapy:  medical diagnosis of right supraspinatous tear , right shoulder pain . Patient presents with decreased range of motion right shoulder , pain in all planes of movement , dominant shoulder weakness with decrease range of motion and pain with movement      Goals:  Short Term Goals: 4 weeks   Pt will be independent with home ex program   Pt will be able to increase arom to 125 degrees flexion and abd   Be able to reach into  cabinet pain free   Decrease worse pain to 5/10      Long Term Goals: 8  weeks   Be able to sleep at night without awaking with shoulder pain   Be able to increase shoulder strength to 4/5   Be able to increase shoulder range of motion to 160 degrees flexion and abd      Plan   Plan of care Certification: 10/8/2024 to 12/8/2024 .     Outpatient Physical Therapy 2 times weekly for 8 weeks to include the following interventions: Manual Therapy, Neuromuscular Re-ed, Patient Education, Therapeutic Activities, Therapeutic Exercise, and modalities as needed  .      Plan of care has been reestablished with Patsy WILDER, Hoa Parra, PTA  10/15/2024

## 2024-10-17 ENCOUNTER — CLINICAL SUPPORT (OUTPATIENT)
Dept: REHABILITATION | Facility: HOSPITAL | Age: 64
End: 2024-10-17
Payer: COMMERCIAL

## 2024-10-17 DIAGNOSIS — M75.101 NONTRAUMATIC TEAR OF RIGHT SUPRASPINATUS TENDON: ICD-10-CM

## 2024-10-17 DIAGNOSIS — R29.898 DECREASED STRENGTH OF UPPER EXTREMITY: ICD-10-CM

## 2024-10-17 DIAGNOSIS — M25.611 DECREASED ROM OF RIGHT SHOULDER: Primary | ICD-10-CM

## 2024-10-17 PROCEDURE — 97112 NEUROMUSCULAR REEDUCATION: CPT | Mod: PN,CQ

## 2024-10-17 PROCEDURE — 97140 MANUAL THERAPY 1/> REGIONS: CPT | Mod: PN,CQ

## 2024-10-17 PROCEDURE — 97530 THERAPEUTIC ACTIVITIES: CPT | Mod: PN,CQ

## 2024-10-17 NOTE — PROGRESS NOTES
Physical Therapy Treatment Note     Name: Amador Callahan  Clinic Number: [id number]    Therapy Diagnosis:   Encounter Diagnoses   Name Primary?    Decreased ROM of right shoulder Yes    Nontraumatic tear of right supraspinatus tendon     Decreased strength of upper extremity      Physician: Olu Vaughn MD    Visit Date: 10/17/2024   Physician Orders: PT Eval and Treat    Medical Diagnosis from Referral: right suprapsinatous nontraumatic tear, right shoulder pain   Evaluation Date: 10/8/2024  Updated Plan of Care Due : 12/8/2024   Authorization Period Expiration: [employer]   Plan of Care Expiration: 12/8/2024   Visit # / Visits authorized: 3/ 15  PTA Visit #: 1    Time In: 1230  Time Out: 110  Total Billable Time: 40 minutes    Precautions: Standard     Subjective     Pt reports: I'm doing a little better  He was compliant with home exercise program.  Response to previous treatment: improved range of motion      Pain: 4/10  Location: right shoulder         Objective       Amador Callahan received the following manual therapy techniques: Joint mobilizations were applied to the: scapula and gleno humeral joint  for 10  minutes, including:  Joint mobs left inferior glides and slides x 5 min   Scapular mobility pnf patterns in both planes x 5 min   Prom to shoulder in all plane stretching to end range x 5 min      Amador Callahan participated in neuromuscular re-education activities to improve: stretching range of motion to activate muscle length to increase range of motion  for 15 minutes. The following activities were included:   Upper body ergometer x 5' (2.5' forward and 2.5' backwards)  Pulleys with stretch at end range pain free x 5 min   Scapular retraction blue x 10  Prone pulls, extension and horizontal abduction x 10     Amador Callahan performed therapeutic activities to promote functional performance for 15  to include:  Dowel flexion stretching to end range x 15 to promote reaching into cabinet  Wall  slides towel and blue ball x 10 to promote reaching overhead    Red theraband  reaching to front  flexion x 20 reps   Red theraband reaching behind her back  x 20 reps     Red theraband external rotation x 20 reps reaching out to side similation of reaching into back seat  Red theraband internal rotation x 20 reps  improving reaching such as tucking in pants   Bicep curls 15 x 3# to simulate lifting grocery bags  Supine external rotation 10 x 1# to promote combing hair      Right shoulder range of motion   flexion  134/160                                                       Abd  125 /158                                                       ER   86/90    Home Exercises Provided and Patient Education Provided     Education provided: pt to cont home ex program     Written Home Exercises Provided: Patient instructed to cont prior HEP.  Exercises were reviewed and Amador was able to demonstrate them prior to the end of the session.  Amador demonstrated good  understanding of the education provided.     See EMR under Patient Instructions for exercises provided 10/11/2024.    Assessment     Pt progressing with range of motion   Amador Is progressing well towards his goals.   Pt prognosis is Excellent.     Pt will continue to benefit from skilled outpatient physical therapy to address the deficits listed in the problem list box on initial evaluation, provide pt/family education and to maximize pt's level of independence in the home and community environment.     Pt's spiritual, cultural and educational needs considered and pt agreeable to plan of care and goals.     Anticipated Barriers for therapy:  medical diagnosis of right supraspinatous tear , right shoulder pain . Patient presents with decreased range of motion right shoulder , pain in all planes of movement , dominant shoulder weakness with decrease range of motion and pain with movement      Goals:  Short Term Goals: 4 weeks   Pt will be independent with home ex  program   Pt will be able to increase arom to 125 degrees flexion and abd   Be able to reach into cabinet pain free   Decrease worse pain to 5/10      Long Term Goals: 8  weeks   Be able to sleep at night without awaking with shoulder pain   Be able to increase shoulder strength to 4/5   Be able to increase shoulder range of motion to 160 degrees flexion and abd      Plan   Plan of care Certification: 10/8/2024 to 12/8/2024 .     Outpatient Physical Therapy 2 times weekly for 8 weeks to include the following interventions: Manual Therapy, Neuromuscular Re-ed, Patient Education, Therapeutic Activities, Therapeutic Exercise, and modalities as needed  .      Plan of care has been reestablished with Patsy WILDER, Hoa Parra, PTA  10/17/2024

## 2024-10-22 ENCOUNTER — CLINICAL SUPPORT (OUTPATIENT)
Dept: REHABILITATION | Facility: HOSPITAL | Age: 64
End: 2024-10-22
Payer: COMMERCIAL

## 2024-10-22 DIAGNOSIS — R29.898 DECREASED STRENGTH OF UPPER EXTREMITY: Primary | ICD-10-CM

## 2024-10-22 PROCEDURE — 97530 THERAPEUTIC ACTIVITIES: CPT | Mod: PN

## 2024-10-22 PROCEDURE — 97112 NEUROMUSCULAR REEDUCATION: CPT | Mod: PN

## 2024-10-22 NOTE — PROGRESS NOTES
Physical Therapy Treatment Note     Name: Amador Callahan  Clinic Number: [id number]    Therapy Diagnosis:   Encounter Diagnosis   Name Primary?    Decreased strength of upper extremity Yes     Physician: Olu Vaughn MD    Visit Date: 10/22/2024   Physician Orders: PT Eval and Treat    Medical Diagnosis from Referral: right suprapsinatous nontraumatic tear, right shoulder pain   Evaluation Date: 10/8/2024  Updated Plan of Care Due : 12/8/2024   Authorization Period Expiration: [employer]   Plan of Care Expiration: 12/8/2024   Visit # / Visits authorized: 5/ 15  PTA Visit #:     Time In: 1230  Time Out: 110  Total Billable Time: 40 minutes    Precautions: Standard     Subjective     Pt reports: I'm doing a little better  He was compliant with home exercise program.  Response to previous treatment: improved range of motion      Pain: 4/10  Location: right shoulder         Objective       Amador Callahan received the following manual therapy techniques: Joint mobilizations were applied to the: scapula and gleno humeral joint  for 10  minutes, including:  Joint mobs left inferior glides and slides x 5 min   Scapular mobility pnf patterns in both planes x 5 min   Prom to shoulder in all plane stretching to end range x 5 min      Amador Callahan participated in neuromuscular re-education activities to improve: stretching range of motion to activate muscle length to increase range of motion  for 15 minutes. The following activities were included:   Upper body ergometer x 5' (2.5' forward and 2.5' backwards)  Pulleys with stretch at end range pain free x 5 min   Scapular retraction blue x 10  Prone pulls, extension and horizontal abduction x 10     Amador Callahan performed therapeutic activities to promote functional performance for 15  to include:  Dowel flexion stretching to end range x 15 to promote reaching into cabinet  Wall slides towel and blue ball x 10 to promote reaching overhead    Red theraband  reaching to  front  flexion x 20 reps   Red theraband reaching behind her back  x 20 reps     Red theraband external rotation x 20 reps reaching out to side similation of reaching into back seat  Red theraband internal rotation x 20 reps  improving reaching such as tucking in pants   Bicep curls 15 x 3# to simulate lifting grocery bags  Supine external rotation 10 x 1# to promote combing hair      Right shoulder range of motion   flexion  [id number]                                                       Abd  137 /180                                                       ER   86/105    Home Exercises Provided and Patient Education Provided     Education provided: pt to cont home ex program     Written Home Exercises Provided: Patient instructed to cont prior HEP.  Exercises were reviewed and Amador was able to demonstrate them prior to the end of the session.  Amador demonstrated good  understanding of the education provided.     See EMR under Patient Instructions for exercises provided 10/11/2024.    Assessment     Pt progressing with range of motion   Amador Is progressing well towards his goals.   Pt prognosis is Excellent.     Pt will continue to benefit from skilled outpatient physical therapy to address the deficits listed in the problem list box on initial evaluation, provide pt/family education and to maximize pt's level of independence in the home and community environment.     Pt's spiritual, cultural and educational needs considered and pt agreeable to plan of care and goals.     Anticipated Barriers for therapy:  medical diagnosis of right supraspinatous tear , right shoulder pain . Patient presents with decreased range of motion right shoulder , pain in all planes of movement , dominant shoulder weakness with decrease range of motion and pain with movement      Goals:  Short Term Goals: 4 weeks   Pt will be independent with home ex program   Pt will be able to increase arom to 125 degrees flexion and abd   Be able to reach  into cabinet pain free   Decrease worse pain to 5/10      Long Term Goals: 8  weeks   Be able to sleep at night without awaking with shoulder pain   Be able to increase shoulder strength to 4/5   Be able to increase shoulder range of motion to 160 degrees flexion and abd      Plan   Plan of care Certification: 10/8/2024 to 12/8/2024 .     Outpatient Physical Therapy 2 times weekly for 8 weeks to include the following interventions: Manual Therapy, Neuromuscular Re-ed, Patient Education, Therapeutic Activities, Therapeutic Exercise, and modalities as needed  .      Plan of care has been reestablished with Patsy WILDER, Hoa Blackmon, PT  10/22/2024

## 2024-10-25 ENCOUNTER — CLINICAL SUPPORT (OUTPATIENT)
Dept: REHABILITATION | Facility: HOSPITAL | Age: 64
End: 2024-10-25
Payer: COMMERCIAL

## 2024-10-25 DIAGNOSIS — R29.898 DECREASED STRENGTH OF UPPER EXTREMITY: Primary | ICD-10-CM

## 2024-10-25 DIAGNOSIS — M75.101 NONTRAUMATIC TEAR OF RIGHT SUPRASPINATUS TENDON: ICD-10-CM

## 2024-10-25 DIAGNOSIS — M25.611 DECREASED ROM OF RIGHT SHOULDER: ICD-10-CM

## 2024-10-25 PROCEDURE — 97530 THERAPEUTIC ACTIVITIES: CPT | Mod: PN,CQ

## 2024-10-25 PROCEDURE — 97112 NEUROMUSCULAR REEDUCATION: CPT | Mod: PN,CQ

## 2024-10-25 NOTE — PROGRESS NOTES
Physical Therapy Treatment Note     Name: Amador Callahan  Clinic Number: [id number]    Therapy Diagnosis:   Encounter Diagnoses   Name Primary?    Decreased strength of upper extremity Yes    Decreased ROM of right shoulder     Nontraumatic tear of right supraspinatus tendon      Physician: Olu Vaughn MD    Visit Date: 10/25/2024   Physician Orders: PT Eval and Treat    Medical Diagnosis from Referral: right suprapsinatous nontraumatic tear, right shoulder pain   Evaluation Date: 10/8/2024  Updated Plan of Care Due : 12/8/2024   Authorization Period Expiration: [employer]   Plan of Care Expiration: 12/8/2024   Visit # / Visits authorized: 6/ 15  PTA Visit #: 1    Time In: 115  Time Out: 155  Total Billable Time: 40 minutes    Precautions: Standard     Subjective     Pt reports: I'm ok today  He was compliant with home exercise program.  Response to previous treatment: improved range of motion      Pain: 6/10  Location: right shoulder         Objective       Amador Callahan received the following manual therapy techniques: Joint mobilizations were applied to the: scapula and gleno humeral joint  for 5  minutes, including:       Amador Callahan participated in neuromuscular re-education activities to improve: stretching range of motion to activate muscle length to increase range of motion  for 15 minutes. The following activities were included:   Upper body ergometer x 5' (2.5' forward and 2.5' backwards)  Pulleys with stretch at end range pain free x 5 min   Scapular retraction blue x 10  Supine external rotation/internal rotation 15 x 1#  Prone pulls, extension  10 x 1#   horizontal abduction x 10     Amador Callahan performed therapeutic activities to promote functional performance for 20  to include:  Dowel flexion stretching to end range x 15 to promote reaching into cabinet  Wall slides towel and blue ball x 10 to promote reaching overhead    Red theraband  reaching to front  flexion x 20 reps   Red  theraband reaching behind her back  x 20 reps     Red theraband external rotation x 20 reps reaching out to side similation of reaching into back seat  Red theraband internal rotation x 20 reps  improving reaching such as tucking in pants   Bicep curls 15 x 3# to simulate lifting grocery bags  Supine external rotation 10 x 1# to promote combing hair      Right shoulder range of motion   flexion  150/170                                                       Abd  135 /170                                                       ER   56/66    Home Exercises Provided and Patient Education Provided     Education provided: pt to cont home ex program     Written Home Exercises Provided: Patient instructed to cont prior HEP.  Exercises were reviewed and Amador was able to demonstrate them prior to the end of the session.  Amador demonstrated good  understanding of the education provided.     See EMR under Patient Instructions for exercises provided 10/11/2024.    Assessment   Patient had difficulty with range of motion this session against gravity. Patient was able to perform prone extension and pulls with 1#   Amador Is progressing well towards his goals.   Pt prognosis is Excellent.     Pt will continue to benefit from skilled outpatient physical therapy to address the deficits listed in the problem list box on initial evaluation, provide pt/family education and to maximize pt's level of independence in the home and community environment.     Pt's spiritual, cultural and educational needs considered and pt agreeable to plan of care and goals.     Anticipated Barriers for therapy:  medical diagnosis of right supraspinatous tear , right shoulder pain . Patient presents with decreased range of motion right shoulder , pain in all planes of movement , dominant shoulder weakness with decrease range of motion and pain with movement      Goals:  Short Term Goals: 4 weeks   Pt will be independent with home ex program   Pt will be able  to increase arom to 125 degrees flexion and abd   Be able to reach into cabinet pain free   Decrease worse pain to 5/10      Long Term Goals: 8  weeks   Be able to sleep at night without awaking with shoulder pain   Be able to increase shoulder strength to 4/5   Be able to increase shoulder range of motion to 160 degrees flexion and abd      Plan   Plan of care Certification: 10/8/2024 to 12/8/2024 .     Outpatient Physical Therapy 2 times weekly for 8 weeks to include the following interventions: Manual Therapy, Neuromuscular Re-ed, Patient Education, Therapeutic Activities, Therapeutic Exercise, and modalities as needed  .      Plan of care has been reestablished with Patsy WILDER, Hoa Parra, PTA  10/25/2024

## 2024-10-29 ENCOUNTER — CLINICAL SUPPORT (OUTPATIENT)
Dept: REHABILITATION | Facility: HOSPITAL | Age: 64
End: 2024-10-29
Payer: COMMERCIAL

## 2024-10-29 DIAGNOSIS — R29.898 DECREASED STRENGTH OF UPPER EXTREMITY: Primary | ICD-10-CM

## 2024-10-29 PROCEDURE — 97112 NEUROMUSCULAR REEDUCATION: CPT | Mod: PN

## 2024-10-29 PROCEDURE — 97014 ELECTRIC STIMULATION THERAPY: CPT | Mod: PN

## 2024-10-29 PROCEDURE — 97530 THERAPEUTIC ACTIVITIES: CPT | Mod: PN

## 2024-10-31 ENCOUNTER — EXTERNAL CHRONIC CARE MANAGEMENT (OUTPATIENT)
Dept: FAMILY MEDICINE | Facility: CLINIC | Age: 64
End: 2024-10-31
Payer: COMMERCIAL

## 2024-10-31 PROCEDURE — G0511 CCM/BHI BY RHC/FQHC 20MIN MO: HCPCS | Mod: ,,, | Performed by: NURSE PRACTITIONER

## 2024-11-01 ENCOUNTER — CLINICAL SUPPORT (OUTPATIENT)
Dept: REHABILITATION | Facility: HOSPITAL | Age: 64
End: 2024-11-01
Payer: COMMERCIAL

## 2024-11-01 DIAGNOSIS — M25.611 DECREASED ROM OF RIGHT SHOULDER: Primary | ICD-10-CM

## 2024-11-01 DIAGNOSIS — R29.898 DECREASED STRENGTH OF UPPER EXTREMITY: ICD-10-CM

## 2024-11-01 DIAGNOSIS — M75.101 NONTRAUMATIC TEAR OF RIGHT SUPRASPINATUS TENDON: ICD-10-CM

## 2024-11-01 PROCEDURE — 97530 THERAPEUTIC ACTIVITIES: CPT | Mod: PN,CQ

## 2024-11-01 PROCEDURE — 97112 NEUROMUSCULAR REEDUCATION: CPT | Mod: PN,CQ

## 2024-11-05 ENCOUNTER — CLINICAL SUPPORT (OUTPATIENT)
Dept: REHABILITATION | Facility: HOSPITAL | Age: 64
End: 2024-11-05
Payer: COMMERCIAL

## 2024-11-05 DIAGNOSIS — M75.101 NONTRAUMATIC TEAR OF RIGHT SUPRASPINATUS TENDON: ICD-10-CM

## 2024-11-05 DIAGNOSIS — R29.898 DECREASED STRENGTH OF UPPER EXTREMITY: Primary | ICD-10-CM

## 2024-11-05 DIAGNOSIS — M25.611 DECREASED ROM OF RIGHT SHOULDER: ICD-10-CM

## 2024-11-05 PROCEDURE — 97112 NEUROMUSCULAR REEDUCATION: CPT | Mod: PN,CQ

## 2024-11-05 PROCEDURE — 97530 THERAPEUTIC ACTIVITIES: CPT | Mod: PN,CQ

## 2024-11-05 PROCEDURE — 97140 MANUAL THERAPY 1/> REGIONS: CPT | Mod: PN,CQ

## 2024-11-05 NOTE — PROGRESS NOTES
Physical Therapy Treatment Note     Name: Amador Callahan  Clinic Number: [id number]    Therapy Diagnosis:   Encounter Diagnoses   Name Primary?    Decreased strength of upper extremity Yes    Decreased ROM of right shoulder     Nontraumatic tear of right supraspinatus tendon      Physician: Olu Vaughn MD    Visit Date: 11/5/2024   Physician Orders: PT Eval and Treat    Medical Diagnosis from Referral: right suprapsinatous nontraumatic tear, right shoulder pain   Evaluation Date: 10/8/2024  Updated Plan of Care Due : 12/8/2024   Authorization Period Expiration: [employer]   Plan of Care Expiration: 12/8/2024   Visit # / Visits authorized: 9/ 15  PTA Visit #: 2    Time In: 150  Time Out: 230  Total Billable Time: 40 minutes    Precautions: Standard     Subjective     Pt reports: I'm doing better  He was compliant with home exercise program.  Response to previous treatment: improved range of motion      Pain: 3/10  Location: right shoulder         Objective       Amador Callahan received the following manual therapy techniques: Joint mobilizations were applied to the: scapula and gleno humeral joint  for 5  minutes, including:       Amador Callahan participated in neuromuscular re-education activities to improve: stretching range of motion to activate muscle length to increase range of motion  for 15 minutes. The following activities were included:   Upper body ergometer x 5' (2.5' forward and 2.5' backwards)  Pulleys with stretch at end range pain free x 5 min   Scapular retraction blue x 10  Prone pulls, extension  10 x 2#   horizontal abduction , lower traps x 10     Amador Callahan performed therapeutic activities to promote functional performance for 25  to include:  Dowel flexion stretching to end range x 15 to promote reaching into cabinet  Wall slides towel and blue ball x 10 to promote reaching overhead    Red theraband  reaching to front  flexion x 20 reps   Red theraband reaching behind her back  x 20  reps     Red theraband external rotation x 20 reps reaching out to side similation of reaching into back seat  Red theraband internal rotation x 20 reps  improving reaching such as tucking in pants   Bicep curls 15 x 4# to simulate lifting grocery bags  Supine external rotation 10 x 1# to promote combing hair  Supine internal rotation 10 x 1# to promote reaching into back pocket      Right shoulder range of motion   flexion  160/170                                                       Abd  146 /170                                                       ER   82/89    Home Exercises Provided and Patient Education Provided     Education provided: pt to cont home ex program     Written Home Exercises Provided: Patient instructed to cont prior HEP.  Exercises were reviewed and Amador was able to demonstrate them prior to the end of the session.  Amador demonstrated good  understanding of the education provided.     See EMR under Patient Instructions for exercises provided 10/11/2024.    Assessment   Patient had improved external rotation range of motion and able to advance to blue theraband.  Amador Is progressing well towards his goals.   Pt prognosis is Excellent.     Pt will continue to benefit from skilled outpatient physical therapy to address the deficits listed in the problem list box on initial evaluation, provide pt/family education and to maximize pt's level of independence in the home and community environment.     Pt's spiritual, cultural and educational needs considered and pt agreeable to plan of care and goals.     Anticipated Barriers for therapy:  medical diagnosis of right supraspinatous tear , right shoulder pain . Patient presents with decreased range of motion right shoulder , pain in all planes of movement , dominant shoulder weakness with decrease range of motion and pain with movement      Goals:  Short Term Goals: 4 weeks   Pt will be independent with home ex program   Pt will be able to increase  arom to 125 degrees flexion and abd   Be able to reach into cabinet pain free   Decrease worse pain to 5/10      Long Term Goals: 8  weeks   Be able to sleep at night without awaking with shoulder pain   Be able to increase shoulder strength to 4/5   Be able to increase shoulder range of motion to 160 degrees flexion and abd      Plan   Plan of care Certification: 10/8/2024 to 12/8/2024 .     Outpatient Physical Therapy 2 times weekly for 8 weeks to include the following interventions: Manual Therapy, Neuromuscular Re-ed, Patient Education, Therapeutic Activities, Therapeutic Exercise, and modalities as needed  .      Plan of care has been reestablished with Patsy WILDER, Hoa Parra, PTA  11/5/2024

## 2024-11-07 ENCOUNTER — CLINICAL SUPPORT (OUTPATIENT)
Dept: REHABILITATION | Facility: HOSPITAL | Age: 64
End: 2024-11-07
Payer: COMMERCIAL

## 2024-11-07 DIAGNOSIS — R29.898 DECREASED STRENGTH OF UPPER EXTREMITY: Primary | ICD-10-CM

## 2024-11-07 PROCEDURE — 97530 THERAPEUTIC ACTIVITIES: CPT | Mod: PN

## 2024-11-07 PROCEDURE — 97112 NEUROMUSCULAR REEDUCATION: CPT | Mod: PN

## 2024-11-07 NOTE — PROGRESS NOTES
Physical Therapy Treatment Note     Name: Amador Callahan  Clinic Number: [id number]    Therapy Diagnosis:   Encounter Diagnosis   Name Primary?    Decreased strength of upper extremity Yes     Physician: Olu Vaughn MD    Visit Date: 11/7/2024   Physician Orders: PT Eval and Treat    Medical Diagnosis from Referral: right suprapsinatous nontraumatic tear, right shoulder pain   Evaluation Date: 10/8/2024  Updated Plan of Care Due : 12/8/2024   Authorization Period Expiration: [employer]   Plan of Care Expiration: 12/8/2024   Visit # / Visits authorized: 10/ 15  PTA Visit #:     Time In: 1309  Time Out: 1400  Total Billable Time: 45 minutes    Precautions: Standard     Subjective     Pt reports: I'm doing better  He was compliant with home exercise program.  Response to previous treatment: improved range of motion      Pain: 3/10  Location: right shoulder         Objective       Amador Callahan received the following manual therapy techniques: Joint mobilizations were applied to the: scapula and gleno humeral joint  for 5  minutes, including:       Amador Callahan participated in neuromuscular re-education activities to improve: stretching range of motion to activate muscle length to increase range of motion  for 15 minutes. The following activities were included:   Upper body ergometer x 5' (2.5' forward and 2.5' backwards)  Pulleys with stretch at end range pain free x 5 min   Scapular retraction blue x 10  Prone pulls, extension  10 x 2#   horizontal abduction , lower traps x 10     Amador Callahan performed therapeutic activities to promote functional performance for 25  to include:  Dowel flexion stretching to end range x 15 to promote reaching into cabinet  Wall slides towel and blue ball x 10 to promote reaching overhead    Red theraband  reaching to front  flexion x 20 reps   Red theraband reaching behind her back  x 20 reps     Red theraband external rotation x 20 reps reaching out to side similation of  reaching into back seat  Red theraband internal rotation x 20 reps  improving reaching such as tucking in pants   Bicep curls 15 x 4# to simulate lifting grocery bags  Supine external rotation 10 x 1# to promote combing hair  Supine internal rotation 10 x 1# to promote reaching into back pocket      Right shoulder range of motion   flexion  160/170                                                       Abd  150/170                                                       ER   90/100    Home Exercises Provided and Patient Education Provided     Education provided: pt to cont home ex program     Written Home Exercises Provided: Patient instructed to cont prior HEP.  Exercises were reviewed and Amador was able to demonstrate them prior to the end of the session.  Amador demonstrated good  understanding of the education provided.     See EMR under Patient Instructions for exercises provided 10/11/2024.    Assessment   Patient had improved external rotation range of motion and able to advance to blue theraband.  Amador Is progressing well towards his goals.   Pt prognosis is Excellent.     Pt will continue to benefit from skilled outpatient physical therapy to address the deficits listed in the problem list box on initial evaluation, provide pt/family education and to maximize pt's level of independence in the home and community environment.     Pt's spiritual, cultural and educational needs considered and pt agreeable to plan of care and goals.     Anticipated Barriers for therapy:  medical diagnosis of right supraspinatous tear , right shoulder pain . Patient presents with decreased range of motion right shoulder , pain in all planes of movement , dominant shoulder weakness with decrease range of motion and pain with movement      Goals:  Short Term Goals: 4 weeks   Pt will be independent with home ex program   Pt will be able to increase arom to 125 degrees flexion and abd   Be able to reach into cabinet pain free   Decrease  worse pain to 5/10      Long Term Goals: 8  weeks   Be able to sleep at night without awaking with shoulder pain   Be able to increase shoulder strength to 4/5   Be able to increase shoulder range of motion to 160 degrees flexion and abd      Plan   Plan of care Certification: 10/8/2024 to 12/8/2024 .     Outpatient Physical Therapy 2 times weekly for 8 weeks to include the following interventions: Manual Therapy, Neuromuscular Re-ed, Patient Education, Therapeutic Activities, Therapeutic Exercise, and modalities as needed  .      Plan of care has been reestablished with Patsy WILDER, Hoa Blackmon, PT  11/7/2024

## 2024-11-10 DIAGNOSIS — E78.5 HYPERLIPIDEMIA, UNSPECIFIED HYPERLIPIDEMIA TYPE: ICD-10-CM

## 2024-11-10 DIAGNOSIS — I10 HYPERTENSION, UNSPECIFIED TYPE: ICD-10-CM

## 2024-11-10 DIAGNOSIS — F41.9 ANXIETY: ICD-10-CM

## 2024-11-12 ENCOUNTER — CLINICAL SUPPORT (OUTPATIENT)
Dept: REHABILITATION | Facility: HOSPITAL | Age: 64
End: 2024-11-12
Payer: COMMERCIAL

## 2024-11-12 DIAGNOSIS — M75.101 NONTRAUMATIC TEAR OF RIGHT SUPRASPINATUS TENDON: ICD-10-CM

## 2024-11-12 DIAGNOSIS — R29.898 DECREASED STRENGTH OF UPPER EXTREMITY: Primary | ICD-10-CM

## 2024-11-12 DIAGNOSIS — M25.611 DECREASED ROM OF RIGHT SHOULDER: ICD-10-CM

## 2024-11-12 PROCEDURE — 97530 THERAPEUTIC ACTIVITIES: CPT | Mod: PN,CQ

## 2024-11-12 PROCEDURE — 97112 NEUROMUSCULAR REEDUCATION: CPT | Mod: PN,CQ

## 2024-11-12 RX ORDER — BUSPIRONE HYDROCHLORIDE 10 MG/1
10 TABLET ORAL 2 TIMES DAILY
Qty: 60 TABLET | Refills: 0 | Status: SHIPPED | OUTPATIENT
Start: 2024-11-12

## 2024-11-12 RX ORDER — LISINOPRIL 10 MG/1
10 TABLET ORAL
Qty: 30 TABLET | Refills: 0 | Status: SHIPPED | OUTPATIENT
Start: 2024-11-12

## 2024-11-12 RX ORDER — ATORVASTATIN CALCIUM 40 MG/1
40 TABLET, FILM COATED ORAL
Qty: 30 TABLET | Refills: 0 | Status: SHIPPED | OUTPATIENT
Start: 2024-11-12

## 2024-11-12 NOTE — PROGRESS NOTES
Physical Therapy Treatment Note     Name: Amador Callahan  Clinic Number: [id number]    Therapy Diagnosis:   Encounter Diagnoses   Name Primary?    Decreased strength of upper extremity Yes    Decreased ROM of right shoulder     Nontraumatic tear of right supraspinatus tendon      Physician: Olu Vaughn MD    Visit Date: 11/12/2024   Physician Orders: PT Eval and Treat    Medical Diagnosis from Referral: right suprapsinatous nontraumatic tear, right shoulder pain   Evaluation Date: 10/8/2024  Updated Plan of Care Due : 12/8/2024   Authorization Period Expiration: [employer]   Plan of Care Expiration: 12/8/2024   Visit # / Visits authorized: 11/ 15  PTA Visit #: 1    Time In: 108  Time Out: 148  Total Billable Time: 40 minutes    Precautions: Standard     Subjective     Pt reports: I'm ok, shoulder getting better  He was compliant with home exercise program.  Response to previous treatment: improved range of motion      Pain: 3/10  Location: right shoulder         Objective       Amador Callahan received the following manual therapy techniques: Joint mobilizations were applied to the: scapula and gleno humeral joint  for 5  minutes, including:    Amador Callahan participated in neuromuscular re-education activities to improve: stretching range of motion to activate muscle length to increase range of motion  for 15 minutes. The following activities were included:   Upper body ergometer x 5' (2.5' forward and 2.5' backwards)  Pulleys with stretch at end range pain free x 5 min   Scapular retraction blue x 10  Prone pulls, extension  10 x 2#   horizontal abduction , lower traps x 10  Supine pnf x 10     Amador Callahan performed therapeutic activities to promote functional performance for 25  to include:  Supine flexion  blue ball stretching to end range x 15 to promote reaching into cabinet  Wall slides towel and blue ball x 10 to promote reaching overhead   blue theraband  reaching to front  flexion x 20 reps    blue theraband reaching behind her back  x 20 reps     Blue theraband external rotation x 20 reps reaching out to side similation of reaching into back seat  Blue theraband internal rotation x 20 reps  improving reaching such as tucking in pants   Supine external rotation 10 x 2# to promote combing hair  Supine internal rotation 10 x 2# to promote reaching into back pocket      Right shoulder range of motion   flexion  160/170                                                       Abd  150/170                                                       ER   90/92    Home Exercises Provided and Patient Education Provided     Education provided: pt to cont home ex program     Written Home Exercises Provided: Patient instructed to cont prior HEP.  Exercises were reviewed and Amador was able to demonstrate them prior to the end of the session.  Amador demonstrated good  understanding of the education provided.     See EMR under Patient Instructions for exercises provided 10/11/2024.    Assessment   Patient given blue theraband to advance home exercise program. Patient able to increase weight to 2# with supine external rotation/internal rotation.  Amador Is progressing well towards his goals.   Pt prognosis is Excellent.     Pt will continue to benefit from skilled outpatient physical therapy to address the deficits listed in the problem list box on initial evaluation, provide pt/family education and to maximize pt's level of independence in the home and community environment.     Pt's spiritual, cultural and educational needs considered and pt agreeable to plan of care and goals.     Anticipated Barriers for therapy:  medical diagnosis of right supraspinatous tear , right shoulder pain . Patient presents with decreased range of motion right shoulder , pain in all planes of movement , dominant shoulder weakness with decrease range of motion and pain with movement      Goals:  Short Term Goals: 4 weeks   Pt will be independent  with home ex program   Pt will be able to increase arom to 125 degrees flexion and abd   Be able to reach into cabinet pain free   Decrease worse pain to 5/10      Long Term Goals: 8  weeks   Be able to sleep at night without awaking with shoulder pain   Be able to increase shoulder strength to 4/5   Be able to increase shoulder range of motion to 160 degrees flexion and abd      Plan   Plan of care Certification: 10/8/2024 to 12/8/2024 .     Outpatient Physical Therapy 2 times weekly for 8 weeks to include the following interventions: Manual Therapy, Neuromuscular Re-ed, Patient Education, Therapeutic Activities, Therapeutic Exercise, and modalities as needed  .      Plan of care has been reestablished with Patsy WILDER, Hoa Parra, PTA  11/12/2024

## 2024-11-14 ENCOUNTER — CLINICAL SUPPORT (OUTPATIENT)
Dept: REHABILITATION | Facility: HOSPITAL | Age: 64
End: 2024-11-14
Payer: COMMERCIAL

## 2024-11-14 DIAGNOSIS — M25.611 DECREASED ROM OF RIGHT SHOULDER: ICD-10-CM

## 2024-11-14 DIAGNOSIS — R29.898 DECREASED STRENGTH OF UPPER EXTREMITY: Primary | ICD-10-CM

## 2024-11-14 DIAGNOSIS — M75.101 NONTRAUMATIC TEAR OF RIGHT SUPRASPINATUS TENDON: ICD-10-CM

## 2024-11-14 PROCEDURE — 97112 NEUROMUSCULAR REEDUCATION: CPT | Mod: PN,CQ

## 2024-11-14 PROCEDURE — 97530 THERAPEUTIC ACTIVITIES: CPT | Mod: PN,CQ

## 2024-11-14 NOTE — PROGRESS NOTES
Physical Therapy Treatment Note     Name: Amador Callahan  Clinic Number: [id number]    Therapy Diagnosis:   Encounter Diagnoses   Name Primary?    Decreased strength of upper extremity Yes    Decreased ROM of right shoulder     Nontraumatic tear of right supraspinatus tendon      Physician: Olu Vaughn MD    Visit Date: 11/14/2024   Physician Orders: PT Eval and Treat    Medical Diagnosis from Referral: right suprapsinatous nontraumatic tear, right shoulder pain   Evaluation Date: 10/8/2024  Updated Plan of Care Due : 12/8/2024   Authorization Period Expiration: [employer]   Plan of Care Expiration: 12/8/2024   Visit # / Visits authorized: 12/ 15  PTA Visit #: 2    Time In: 106  Time Out: 151  Total Billable Time: 40 minutes    Precautions: Standard     Subjective     Pt reports: I'm doing good.  He was compliant with home exercise program.  Response to previous treatment: improved range of motion      Pain: 0/10  Location: right shoulder         Objective       Amador Callahan received the following manual therapy techniques: Joint mobilizations were applied to the: scapula and gleno humeral joint  for 5  minutes, including:    Amador Callahan participated in neuromuscular re-education activities to improve: stretching range of motion to activate muscle length to increase range of motion  for 15 minutes. The following activities were included:   Upper body ergometer  intensity 2 x 5' (2.5' forward and 2.5' backwards)  Pulleys with stretch at end range pain free x 5 min   Scapular retraction blue x 10  Scapular retraction thumbs out blue x 10  Doorway pectoralis stretch x 5  Prone pulls, extension, horizontal abduction  10 x 2#  Prone lower traps x 10  Supine pnf x 10     Amador Callahan performed therapeutic activities to promote functional performance for 25  to include:  Supine flexion with dowel 15 x 3# to promote reaching into cabinet   blue theraband  reaching to front  flexion x 20 reps   blue theraband  reaching behind her back  x 20 reps     Blue theraband external rotation x 20 reps reaching out to side similation of reaching into back seat  Blue theraband internal rotation x 20 reps  improving reaching such as tucking in pants   Supine external rotation 10 x 2# to promote combing hair  Supine internal rotation 10 x 2# to promote reaching into back pocket      Right shoulder range of motion   flexion  160/172                                                       Abd  160/171                                                       ER   90/92    Home Exercises Provided and Patient Education Provided     Education provided: pt to cont home ex program     Written Home Exercises Provided: Patient instructed to cont prior HEP.  Exercises were reviewed and Amador was able to demonstrate them prior to the end of the session.  Amador demonstrated good  understanding of the education provided.     See EMR under Patient Instructions for exercises provided 10/11/2024.    Assessment   Patient progressing with posterior cuff stability, and had improved active abduction range of motion.  Amador Is progressing well towards his goals.   Pt prognosis is Excellent.     Pt will continue to benefit from skilled outpatient physical therapy to address the deficits listed in the problem list box on initial evaluation, provide pt/family education and to maximize pt's level of independence in the home and community environment.     Pt's spiritual, cultural and educational needs considered and pt agreeable to plan of care and goals.     Anticipated Barriers for therapy:  medical diagnosis of right supraspinatous tear , right shoulder pain . Patient presents with decreased range of motion right shoulder , pain in all planes of movement , dominant shoulder weakness with decrease range of motion and pain with movement      Goals:  Short Term Goals: 4 weeks   Pt will be independent with home ex program -met  Pt will be able to increase arom to 125  degrees flexion and abd -met  Be able to reach into cabinet pain free- met  Decrease worse pain to 5/10 -met     Long Term Goals: 8  weeks   Be able to sleep at night without awaking with shoulder pain   Be able to increase shoulder strength to 4/5   Be able to increase shoulder range of motion to 160 degrees flexion and abd      Plan   Plan of care Certification: 10/8/2024 to 12/8/2024 .     Outpatient Physical Therapy 2 times weekly for 8 weeks to include the following interventions: Manual Therapy, Neuromuscular Re-ed, Patient Education, Therapeutic Activities, Therapeutic Exercise, and modalities as needed  .      Plan of care has been reestablished with Patsy WILDER, Hoa Parra, PTA  11/14/2024

## 2024-11-19 ENCOUNTER — CLINICAL SUPPORT (OUTPATIENT)
Dept: REHABILITATION | Facility: HOSPITAL | Age: 64
End: 2024-11-19
Payer: COMMERCIAL

## 2024-11-19 DIAGNOSIS — R29.898 DECREASED STRENGTH OF UPPER EXTREMITY: Primary | ICD-10-CM

## 2024-11-19 PROCEDURE — 97530 THERAPEUTIC ACTIVITIES: CPT | Mod: PN

## 2024-11-19 PROCEDURE — 97112 NEUROMUSCULAR REEDUCATION: CPT | Mod: PN

## 2024-11-19 NOTE — PROGRESS NOTES
Physical Therapy Treatment Note     Name: Amador Callahan  Clinic Number: [id number]    Therapy Diagnosis:   No diagnosis found.    Physician: Olu Vaughn MD    Visit Date: 11/19/2024   Physician Orders: PT Eval and Treat    Medical Diagnosis from Referral: right suprapsinatous nontraumatic tear, right shoulder pain   Evaluation Date: 10/8/2024  Updated Plan of Care Due : 12/8/2024   Authorization Period Expiration: [employer]   Plan of Care Expiration: 12/8/2024   Visit # / Visits authorized: 13/ 15  PTA Visit #:     Time In: 1303  Time Out: 1350  Total Billable Time: 47 minutes    Precautions: Standard     Subjective     Pt reports: I'm doing good no pain   He was compliant with home exercise program.  Response to previous treatment: improved range of motion      Pain: 0/10  Location: right shoulder         Objective       Amador Callahan received the following manual therapy techniques: Joint mobilizations were applied to the: scapula and gleno humeral joint  for 5  minutes, including:    mAador Callahan participated in neuromuscular re-education activities to improve: stretching range of motion to activate muscle length to increase range of motion  for 15 minutes. The following activities were included:   Upper body ergometer  intensity 2 x 5' (2.5' forward and 2.5' backwards)  Pulleys with stretch at end range pain free x 5 min   Scapular retraction blue x 10  Scapular retraction thumbs out blue x 10  Doorway pectoralis stretch x 5  Prone pulls, extension, horizontal abduction  10 x 2#  Prone lower traps x 10  Supine pnf x 10     Amador Callahan performed therapeutic activities to promote functional performance for 25  to include:  Supine flexion with dowel 15 x 3# to promote reaching into cabinet   blue theraband  reaching to front  flexion x 20 reps   blue theraband reaching behind  back  x 20 reps     Blue theraband external rotation x 20 reps reaching out to side similation of reaching into back  seat  Blue theraband internal rotation x 20 reps  improving reaching such as tucking in pants   Supine external rotation 10 x 2# to promote combing hair  Supine internal rotation 10 x 2# to promote reaching into back pocket      Right shoulder range of motion   flexion  165/172                                                       Abd  158/171                                                       ER   90/92    Home Exercises Provided and Patient Education Provided     Education provided: pt to cont home ex program     Written Home Exercises Provided: Patient instructed to cont prior HEP.  Exercises were reviewed and Amador was able to demonstrate them prior to the end of the session.  Amador demonstrated good  understanding of the education provided.     See EMR under Patient Instructions for exercises provided 10/11/2024.    Assessment   Patient progressing with posterior cuff stability, and had improved active abduction range of motion.  Amador Is progressing well towards his goals.   Pt prognosis is Excellent.     Pt will continue to benefit from skilled outpatient physical therapy to address the deficits listed in the problem list box on initial evaluation, provide pt/family education and to maximize pt's level of independence in the home and community environment.     Pt's spiritual, cultural and educational needs considered and pt agreeable to plan of care and goals.     Anticipated Barriers for therapy:  medical diagnosis of right supraspinatous tear , right shoulder pain . Patient presents with decreased range of motion right shoulder , pain in all planes of movement , dominant shoulder weakness with decrease range of motion and pain with movement      Goals:  Short Term Goals: 4 weeks   Pt will be independent with home ex program -met  Pt will be able to increase arom to 125 degrees flexion and abd -met  Be able to reach into cabinet pain free- met  Decrease worse pain to 5/10 -met     Long Term Goals: 8   weeks   Be able to sleep at night without awaking with shoulder pain   Be able to increase shoulder strength to 4/5   Be able to increase shoulder range of motion to 160 degrees flexion and abd      Plan   Plan of care Certification: 10/8/2024 to 12/8/2024 .     Outpatient Physical Therapy 2 times weekly for 8 weeks to include the following interventions: Manual Therapy, Neuromuscular Re-ed, Patient Education, Therapeutic Activities, Therapeutic Exercise, and modalities as needed  .      Plan of care has been reestablished with Patsy WILDER, Hoa Blackmon, PT  11/19/2024

## 2024-11-21 ENCOUNTER — CLINICAL SUPPORT (OUTPATIENT)
Dept: REHABILITATION | Facility: HOSPITAL | Age: 64
End: 2024-11-21
Payer: COMMERCIAL

## 2024-11-21 DIAGNOSIS — M25.611 DECREASED ROM OF RIGHT SHOULDER: ICD-10-CM

## 2024-11-21 DIAGNOSIS — R29.898 DECREASED STRENGTH OF UPPER EXTREMITY: Primary | ICD-10-CM

## 2024-11-21 PROCEDURE — 97530 THERAPEUTIC ACTIVITIES: CPT | Mod: PN,CQ

## 2024-11-21 PROCEDURE — 97112 NEUROMUSCULAR REEDUCATION: CPT | Mod: PN,CQ

## 2024-11-21 NOTE — PROGRESS NOTES
Physical Therapy Treatment Note     Name: Amador Callahan  Clinic Number: [id number]    Therapy Diagnosis:   Encounter Diagnoses   Name Primary?    Decreased strength of upper extremity Yes    Decreased ROM of right shoulder        Physician: Olu Vaughn MD    Visit Date: 11/21/2024   Physician Orders: PT Eval and Treat    Medical Diagnosis from Referral: right suprapsinatous nontraumatic tear, right shoulder pain   Evaluation Date: 10/8/2024  Updated Plan of Care Due : 12/8/2024   Authorization Period Expiration: [employer]   Plan of Care Expiration: 12/8/2024   Visit # / Visits authorized: 14/ 15  PTA Visit #: 1    Time In: 100  Time Out: 145  Total Billable Time: 45 minutes    Precautions: Standard     Subjective     Pt reports: I'm doing good no pain   He was compliant with home exercise program.  Response to previous treatment: improved range of motion      Pain: 0/10  Location: right shoulder         Objective       Amador Callahan received the following manual therapy techniques: Joint mobilizations were applied to the: scapula and gleno humeral joint  for 5  minutes, including:    Amador Callahan participated in neuromuscular re-education activities to improve: stretching range of motion to activate muscle length to increase range of motion  for 15 minutes. The following activities were included:   Upper body ergometer  intensity 2 x 5' (2.5' forward and 2.5' backwards)  Pulleys with stretch at end range pain free x 5 min   Scapular retraction blue x 10  Scapular retraction thumbs out blue x 10  Doorway pectoralis stretch x 5  Prone pulls, extension, horizontal abduction  10 x 2#  Prone lower traps x 10  Sitting serratus press right x 10     Amador Callahan performed therapeutic activities to promote functional performance for 25  to include:  Supine flexion with dowel 15 x 3# to promote reaching into cabinet   blue theraband  reaching to front  flexion x 20 reps   blue theraband reaching behind  back  x  20 reps     Blue theraband external rotation x 20 reps reaching out to side similation of reaching into back seat  Blue theraband internal rotation x 20 reps  improving reaching such as tucking in pants   Supine external rotation 10 x 2# to promote combing hair  Supine internal rotation 10 x 2# to promote reaching into back pocket      Right shoulder range of motion   flexion  165/175                                                       Abd  165/175                                                       ER   90/92    Home Exercises Provided and Patient Education Provided     Education provided: pt to cont home ex program     Written Home Exercises Provided: Patient instructed to cont prior HEP.  Exercises were reviewed and Amador was able to demonstrate them prior to the end of the session.  Amador demonstrated good  understanding of the education provided.     See EMR under Patient Instructions for exercises provided 10/11/2024.    Assessment   Patient progressing with range of motion, fatigued quickly with serratus presses.  Amador Is progressing well towards his goals.   Pt prognosis is Excellent.     Pt will continue to benefit from skilled outpatient physical therapy to address the deficits listed in the problem list box on initial evaluation, provide pt/family education and to maximize pt's level of independence in the home and community environment.     Pt's spiritual, cultural and educational needs considered and pt agreeable to plan of care and goals.     Anticipated Barriers for therapy:  medical diagnosis of right supraspinatous tear , right shoulder pain . Patient presents with decreased range of motion right shoulder , pain in all planes of movement , dominant shoulder weakness with decrease range of motion and pain with movement      Goals:  Short Term Goals: 4 weeks   Pt will be independent with home ex program -met  Pt will be able to increase arom to 125 degrees flexion and abd -met  Be able to reach  into cabinet pain free- met  Decrease worse pain to 5/10 -met     Long Term Goals: 8  weeks   Be able to sleep at night without awaking with shoulder pain   Be able to increase shoulder strength to 4/5   Be able to increase shoulder range of motion to 160 degrees flexion and abd      Plan   Plan of care Certification: 10/8/2024 to 12/8/2024 .     Outpatient Physical Therapy 2 times weekly for 8 weeks to include the following interventions: Manual Therapy, Neuromuscular Re-ed, Patient Education, Therapeutic Activities, Therapeutic Exercise, and modalities as needed  .      Plan of care has been reestablished with Patsy WILDER, Hoa Parra, PTA  11/21/2024

## 2024-11-26 ENCOUNTER — CLINICAL SUPPORT (OUTPATIENT)
Dept: REHABILITATION | Facility: HOSPITAL | Age: 64
End: 2024-11-26
Payer: COMMERCIAL

## 2024-11-26 DIAGNOSIS — R29.898 DECREASED STRENGTH OF UPPER EXTREMITY: Primary | ICD-10-CM

## 2024-11-26 DIAGNOSIS — M25.611 DECREASED ROM OF RIGHT SHOULDER: ICD-10-CM

## 2024-11-26 PROCEDURE — 97530 THERAPEUTIC ACTIVITIES: CPT | Mod: PN,CQ

## 2024-11-26 PROCEDURE — 97112 NEUROMUSCULAR REEDUCATION: CPT | Mod: PN,CQ

## 2024-11-26 NOTE — PROGRESS NOTES
Physical Therapy Treatment Note     Name: Amador Callahan  Clinic Number: [id number]    Therapy Diagnosis:   Encounter Diagnoses   Name Primary?    Decreased strength of upper extremity Yes    Decreased ROM of right shoulder        Physician: Olu Vaughn MD    Visit Date: 11/26/2024   Physician Orders: PT Eval and Treat    Medical Diagnosis from Referral: right suprapsinatous nontraumatic tear, right shoulder pain   Evaluation Date: 10/8/2024  Updated Plan of Care Due : 12/8/2024   Authorization Period Expiration: [employer]   Plan of Care Expiration: 12/8/2024   Visit # / Visits authorized: 15/ 15  PTA Visit #: 2    Time In: 105  Time Out: 145  Total Billable Time: 40 minutes    Precautions: Standard     Subjective     Pt reports: I'm doing good no pain   He was compliant with home exercise program.  Response to previous treatment: improved range of motion      Pain: 0/10  Location: right shoulder         Objective     Amador Callahan participated in neuromuscular re-education activities to improve: stretching range of motion to activate muscle length to increase range of motion  for 15 minutes. The following activities were included:   Upper body ergometer  intensity 2 x 5' (2.5' forward and 2.5' backwards)  Pulleys with stretch at end range pain free x 5 min   Scapular retraction blue x 10  Scapular retraction thumbs out blue x 10  Prone pulls, extension, horizontal abduction  10 x 2#  Prone lower traps x 10  Sitting serratus press right x 10     Amador Callahan performed therapeutic activities to promote functional performance for 25  to include:  Supine flexion with blue ball x 15 to promote reaching into cabinet   blue theraband  reaching to front  flexion x 20 reps   blue theraband reaching behind  back  x 20 reps     Blue theraband external rotation x 20 reps reaching out to side similation of reaching into back seat  Blue theraband internal rotation x 20 reps  improving reaching such as tucking in pants    Supine external rotation 10 x 2# to promote combing hair  Supine internal rotation 10 x 2# to promote reaching into back pocket      Right shoulder range of motion   flexion  172/178                                                       Abd  174/180                                                       ER   90/92    Home Exercises Provided and Patient Education Provided     Education provided: pt to cont home ex program     Written Home Exercises Provided: Patient instructed to cont prior HEP.  Exercises were reviewed and Amador was able to demonstrate them prior to the end of the session.  Amador demonstrated good  understanding of the education provided.     See EMR under Patient Instructions for exercises provided 10/11/2024.    Assessment   Patient instructed to be consistent with home exercise program. Patient has progressed well with therapy and met all goals outlined in evaluation.  Amador Is progressing well towards his goals.   Pt prognosis is Excellent.     Pt will continue to benefit from skilled outpatient physical therapy to address the deficits listed in the problem list box on initial evaluation, provide pt/family education and to maximize pt's level of independence in the home and community environment.     Pt's spiritual, cultural and educational needs considered and pt agreeable to plan of care and goals.     Anticipated Barriers for therapy:  medical diagnosis of right supraspinatous tear , right shoulder pain . Patient presents with decreased range of motion right shoulder , pain in all planes of movement , dominant shoulder weakness with decrease range of motion and pain with movement      Goals:  Short Term Goals: 4 weeks   Pt will be independent with home ex program -met  Pt will be able to increase arom to 125 degrees flexion and abd -met  Be able to reach into cabinet pain free- met  Decrease worse pain to 5/10 -met     Long Term Goals: 8  weeks   Be able to sleep at night without awaking  with shoulder pain -met  Be able to increase shoulder strength to 4/5 -met  Be able to increase shoulder range of motion to 160 degrees flexion and abd -met     Plan   Will d/c to home exercise program, see physical therapist d/c note     Mary Parra, PTA  11/26/2024

## 2024-11-30 ENCOUNTER — EXTERNAL CHRONIC CARE MANAGEMENT (OUTPATIENT)
Dept: FAMILY MEDICINE | Facility: CLINIC | Age: 64
End: 2024-11-30
Payer: COMMERCIAL

## 2024-11-30 PROCEDURE — G0511 CCM/BHI BY RHC/FQHC 20MIN MO: HCPCS | Mod: ,,, | Performed by: NURSE PRACTITIONER

## 2024-12-02 ENCOUNTER — OFFICE VISIT (OUTPATIENT)
Dept: FAMILY MEDICINE | Facility: CLINIC | Age: 64
End: 2024-12-02
Payer: COMMERCIAL

## 2024-12-02 VITALS
HEIGHT: 66 IN | RESPIRATION RATE: 18 BRPM | DIASTOLIC BLOOD PRESSURE: 67 MMHG | BODY MASS INDEX: 22.98 KG/M2 | WEIGHT: 143 LBS | SYSTOLIC BLOOD PRESSURE: 133 MMHG | OXYGEN SATURATION: 99 % | HEART RATE: 84 BPM | TEMPERATURE: 98 F

## 2024-12-02 DIAGNOSIS — G89.29 CHRONIC RIGHT SHOULDER PAIN: ICD-10-CM

## 2024-12-02 DIAGNOSIS — K21.9 GASTROESOPHAGEAL REFLUX DISEASE, UNSPECIFIED WHETHER ESOPHAGITIS PRESENT: ICD-10-CM

## 2024-12-02 DIAGNOSIS — M75.22 BICEPS TENDINITIS OF BOTH SHOULDERS: ICD-10-CM

## 2024-12-02 DIAGNOSIS — I10 HYPERTENSION, UNSPECIFIED TYPE: Primary | ICD-10-CM

## 2024-12-02 DIAGNOSIS — M75.21 BICEPS TENDINITIS OF BOTH SHOULDERS: ICD-10-CM

## 2024-12-02 DIAGNOSIS — F41.9 ANXIETY: ICD-10-CM

## 2024-12-02 DIAGNOSIS — M25.511 CHRONIC RIGHT SHOULDER PAIN: ICD-10-CM

## 2024-12-02 DIAGNOSIS — E78.5 HYPERLIPIDEMIA, UNSPECIFIED HYPERLIPIDEMIA TYPE: ICD-10-CM

## 2024-12-02 LAB
ALBUMIN SERPL BCP-MCNC: 3.9 G/DL (ref 3.4–4.8)
ALBUMIN/GLOB SERPL: 1 {RATIO}
ALP SERPL-CCNC: 115 U/L (ref 40–150)
ALT SERPL W P-5'-P-CCNC: 46 U/L
ANION GAP SERPL CALCULATED.3IONS-SCNC: 11 MMOL/L (ref 7–16)
AST SERPL W P-5'-P-CCNC: 29 U/L (ref 5–34)
BASOPHILS # BLD AUTO: 0.04 K/UL (ref 0–0.2)
BASOPHILS NFR BLD AUTO: 0.5 % (ref 0–1)
BILIRUB SERPL-MCNC: 0.8 MG/DL
BUN SERPL-MCNC: 16 MG/DL (ref 8–26)
BUN/CREAT SERPL: 15 (ref 6–20)
CALCIUM SERPL-MCNC: 9.2 MG/DL (ref 8.8–10)
CHLORIDE SERPL-SCNC: 104 MMOL/L (ref 98–107)
CHOLEST SERPL-MCNC: 181 MG/DL
CHOLEST/HDLC SERPL: 4.5 {RATIO}
CO2 SERPL-SCNC: 26 MMOL/L (ref 23–31)
CREAT SERPL-MCNC: 1.05 MG/DL (ref 0.72–1.25)
DIFFERENTIAL METHOD BLD: ABNORMAL
EGFR (NO RACE VARIABLE) (RUSH/TITUS): 79 ML/MIN/1.73M2
EOSINOPHIL # BLD AUTO: 0.27 K/UL (ref 0–0.5)
EOSINOPHIL NFR BLD AUTO: 3.2 % (ref 1–4)
ERYTHROCYTE [DISTWIDTH] IN BLOOD BY AUTOMATED COUNT: 12.8 % (ref 11.5–14.5)
GLOBULIN SER-MCNC: 4.1 G/DL (ref 2–4)
GLUCOSE SERPL-MCNC: 107 MG/DL (ref 82–115)
HCT VFR BLD AUTO: 46.4 % (ref 40–54)
HDLC SERPL-MCNC: 40 MG/DL (ref 35–60)
HGB BLD-MCNC: 14 G/DL (ref 13.5–18)
IMM GRANULOCYTES # BLD AUTO: 0.03 K/UL (ref 0–0.04)
IMM GRANULOCYTES NFR BLD: 0.4 % (ref 0–0.4)
LDLC SERPL CALC-MCNC: 111 MG/DL
LDLC/HDLC SERPL: 2.8 {RATIO}
LYMPHOCYTES # BLD AUTO: 1.52 K/UL (ref 1–4.8)
LYMPHOCYTES NFR BLD AUTO: 17.9 % (ref 27–41)
MCH RBC QN AUTO: 27.1 PG (ref 27–31)
MCHC RBC AUTO-ENTMCNC: 30.2 G/DL (ref 32–36)
MCV RBC AUTO: 89.7 FL (ref 80–96)
MONOCYTES # BLD AUTO: 0.61 K/UL (ref 0–0.8)
MONOCYTES NFR BLD AUTO: 7.2 % (ref 2–6)
MPC BLD CALC-MCNC: 10.4 FL (ref 9.4–12.4)
NEUTROPHILS # BLD AUTO: 6.01 K/UL (ref 1.8–7.7)
NEUTROPHILS NFR BLD AUTO: 70.8 % (ref 53–65)
NONHDLC SERPL-MCNC: 141 MG/DL
NRBC # BLD AUTO: 0 X10E3/UL
NRBC, AUTO (.00): 0 %
PLATELET # BLD AUTO: 215 K/UL (ref 150–400)
POTASSIUM SERPL-SCNC: 4.7 MMOL/L (ref 3.5–5.1)
PROT SERPL-MCNC: 8 G/DL (ref 5.8–7.6)
RBC # BLD AUTO: 5.17 M/UL (ref 4.6–6.2)
SODIUM SERPL-SCNC: 136 MMOL/L (ref 136–145)
TRIGL SERPL-MCNC: 149 MG/DL (ref 34–140)
VLDLC SERPL-MCNC: 30 MG/DL
WBC # BLD AUTO: 8.48 K/UL (ref 4.5–11)

## 2024-12-02 PROCEDURE — 3061F NEG MICROALBUMINURIA REV: CPT | Mod: ,,, | Performed by: NURSE PRACTITIONER

## 2024-12-02 PROCEDURE — 3078F DIAST BP <80 MM HG: CPT | Mod: ,,, | Performed by: NURSE PRACTITIONER

## 2024-12-02 PROCEDURE — 1160F RVW MEDS BY RX/DR IN RCRD: CPT | Mod: ,,, | Performed by: NURSE PRACTITIONER

## 2024-12-02 PROCEDURE — 3288F FALL RISK ASSESSMENT DOCD: CPT | Mod: ,,, | Performed by: NURSE PRACTITIONER

## 2024-12-02 PROCEDURE — 85025 COMPLETE CBC W/AUTO DIFF WBC: CPT | Mod: ,,, | Performed by: CLINICAL MEDICAL LABORATORY

## 2024-12-02 PROCEDURE — 3066F NEPHROPATHY DOC TX: CPT | Mod: ,,, | Performed by: NURSE PRACTITIONER

## 2024-12-02 PROCEDURE — 4010F ACE/ARB THERAPY RXD/TAKEN: CPT | Mod: ,,, | Performed by: NURSE PRACTITIONER

## 2024-12-02 PROCEDURE — 99214 OFFICE O/P EST MOD 30 MIN: CPT | Mod: ,,, | Performed by: NURSE PRACTITIONER

## 2024-12-02 PROCEDURE — 1126F AMNT PAIN NOTED NONE PRSNT: CPT | Mod: ,,, | Performed by: NURSE PRACTITIONER

## 2024-12-02 PROCEDURE — 80053 COMPREHEN METABOLIC PANEL: CPT | Mod: ,,, | Performed by: CLINICAL MEDICAL LABORATORY

## 2024-12-02 PROCEDURE — 80061 LIPID PANEL: CPT | Mod: ,,, | Performed by: CLINICAL MEDICAL LABORATORY

## 2024-12-02 PROCEDURE — 1159F MED LIST DOCD IN RCRD: CPT | Mod: ,,, | Performed by: NURSE PRACTITIONER

## 2024-12-02 PROCEDURE — 3008F BODY MASS INDEX DOCD: CPT | Mod: ,,, | Performed by: NURSE PRACTITIONER

## 2024-12-02 PROCEDURE — 3075F SYST BP GE 130 - 139MM HG: CPT | Mod: ,,, | Performed by: NURSE PRACTITIONER

## 2024-12-02 PROCEDURE — 1101F PT FALLS ASSESS-DOCD LE1/YR: CPT | Mod: ,,, | Performed by: NURSE PRACTITIONER

## 2024-12-02 RX ORDER — ONDANSETRON 4 MG/1
4 TABLET, FILM COATED ORAL EVERY 8 HOURS PRN
Qty: 30 TABLET | Refills: 1 | Status: SHIPPED | OUTPATIENT
Start: 2024-12-02

## 2024-12-02 RX ORDER — OMEPRAZOLE 20 MG/1
20 CAPSULE, DELAYED RELEASE ORAL DAILY
Qty: 90 CAPSULE | Refills: 1 | Status: SHIPPED | OUTPATIENT
Start: 2024-12-02

## 2024-12-02 RX ORDER — LISINOPRIL 10 MG/1
10 TABLET ORAL DAILY
Qty: 90 TABLET | Refills: 1 | Status: SHIPPED | OUTPATIENT
Start: 2024-12-02

## 2024-12-02 RX ORDER — BUSPIRONE HYDROCHLORIDE 10 MG/1
10 TABLET ORAL 2 TIMES DAILY
Qty: 180 TABLET | Refills: 1 | Status: SHIPPED | OUTPATIENT
Start: 2024-12-02

## 2024-12-02 RX ORDER — ATORVASTATIN CALCIUM 40 MG/1
40 TABLET, FILM COATED ORAL NIGHTLY
Qty: 90 TABLET | Refills: 1 | Status: SHIPPED | OUTPATIENT
Start: 2024-12-02

## 2024-12-02 RX ORDER — CYCLOBENZAPRINE HCL 10 MG
10 TABLET ORAL 2 TIMES DAILY PRN
Qty: 30 TABLET | Refills: 1 | Status: SHIPPED | OUTPATIENT
Start: 2024-12-02

## 2024-12-02 NOTE — PROGRESS NOTES
Kaya Go DNP, FNP    [address] [place] [address]     PATIENT NAME: Amador Callahan  : 1960  DATE: 24  MRN: [id number]      Billing Provider: Kaya Go DNP, FNP  Level of Service:   Patient PCP Information       Provider PCP Type    Kaya Go DNP, FNP General            Reason for Visit / Chief Complaint: Medication Refill (He is not fasting)       Update PCP  Update Chief Complaint         History of Present Illness / Problem Focused Workflow     Amador Callahan presents to the clinic with Medication Refill (He is not fasting)   Denies any complaints other than difficulty sleeping. Looses breath when sleeping at night and sleeps most of the time sitting up.   Never had sleep study.     Medication Refill  Pertinent negatives include no abdominal pain, arthralgias, change in bowel habit, chest pain, chills, congestion, coughing, fatigue, fever, headaches, myalgias, nausea, rash, sore throat, vomiting or weakness.       Review of Systems     Review of Systems   Constitutional:  Negative for activity change, appetite change, chills, fatigue and fever.   HENT:  Negative for nasal congestion, ear pain, hearing loss, postnasal drip and sore throat.    Respiratory:  Negative for cough, chest tightness, shortness of breath and wheezing.    Cardiovascular:  Negative for chest pain, palpitations, leg swelling and claudication.   Gastrointestinal:  Negative for abdominal pain, change in bowel habit, constipation, diarrhea, nausea and vomiting.   Genitourinary:  Negative for dysuria.   Musculoskeletal:  Negative for arthralgias, back pain, gait problem and myalgias.   Integumentary:  Negative for rash.   Neurological:  Negative for weakness and headaches.   Psychiatric/Behavioral:  Positive for sleep disturbance. Negative for suicidal ideas. The patient is not nervous/anxious.         Medical / Social / Family History     Past Medical History:    Diagnosis Date    Adenomatous polyp of descending colon 5/17/2021    Anxiety     Diverticula, colon 5/17/2021    GERD (gastroesophageal reflux disease)     History of cerebrovascular accident     Hyperlipidemia     Hypertension     Screening for malignant neoplasm of colon 5/17/2021       Past Surgical History:   Procedure Laterality Date    EPIDURAL STEROID INJECTION INTO CERVICAL SPINE N/A 12/02/2022    Procedure: Injection-steroid-epidural-cervical, C4/5 EDE;  Surgeon: Angelic Hdz MD;  Location: [facility];  Service: Pain Management;  Laterality: N/A;    EXTERNAL EAR SURGERY Left     heart cath      MASTOID SURGERY Left 12/15/2023    middle ear and mastoid surgery       Social History  Mr. Amador Callahan  reports that he quit smoking about 44 years ago. His smoking use included cigarettes. He started smoking about 46 years ago. He has a 2 pack-year smoking history. He has been exposed to tobacco smoke. He has never used smokeless tobacco. He reports that he does not drink alcohol and does not use drugs.    Family History  Mr. Amador Callahan's family history includes Cancer in his father and sister; Heart disease in his mother.    Medications and Allergies     Medications  Outpatient Medications Marked as Taking for the 12/2/24 encounter (Office Visit) with Kaya Go, VERONIKA, FNP   Medication Sig Dispense Refill    [DISCONTINUED] atorvastatin (LIPITOR) 40 MG tablet Take 1 tablet by mouth once daily 30 tablet 0    [DISCONTINUED] busPIRone (BUSPAR) 10 MG tablet Take 1 tablet by mouth twice daily 60 tablet 0    [DISCONTINUED] cyclobenzaprine (FLEXERIL) 10 MG tablet Take 10 mg by mouth 2 (two) times daily as needed.      [DISCONTINUED] lisinopriL 10 MG tablet Take 1 tablet by mouth once daily 30 tablet 0    [DISCONTINUED] omeprazole (PRILOSEC) 20 MG capsule Take 1 capsule (20 mg total) by mouth once daily. 90 capsule 1    [DISCONTINUED] ondansetron (ZOFRAN) 4 MG tablet Take 1 tablet (4 mg total)  "by mouth every 8 (eight) hours as needed for Nausea. 30 tablet 1       Allergies  Review of patient's allergies indicates:  No Known Allergies    Physical Examination     Vitals:    12/02/24 0816   BP: 133/67   BP Location: Left arm   Patient Position: Sitting   Pulse: 84   Resp: 18   Temp: 97.7 °F (36.5 °C)   TempSrc: Oral   SpO2: 99%   Weight: 64.9 kg (143 lb)   Height: 5' 6" (1.676 m)     Physical Exam  Vitals and nursing note reviewed.   Constitutional:       General: He is not in acute distress.     Appearance: Normal appearance. He is normal weight.   HENT:      Mouth/Throat:      Mouth: Mucous membranes are moist.      Comments: Mouth drawn--chronic  Eyes:      Pupils: Pupils are equal, round, and reactive to light.   Cardiovascular:      Rate and Rhythm: Normal rate and regular rhythm.      Pulses: Normal pulses.      Heart sounds: No murmur heard.  Pulmonary:      Effort: Pulmonary effort is normal. No respiratory distress.      Breath sounds: Normal breath sounds. No wheezing, rhonchi or rales.   Chest:      Chest wall: No tenderness.   Abdominal:      General: Bowel sounds are normal. There is no distension.      Palpations: Abdomen is soft.      Tenderness: There is no abdominal tenderness. There is no right CVA tenderness, left CVA tenderness, guarding or rebound.   Musculoskeletal:         General: No swelling or tenderness. Normal range of motion.      Cervical back: Normal range of motion and neck supple.      Right lower leg: No edema.      Left lower leg: No edema.   Skin:     General: Skin is warm and dry.   Neurological:      General: No focal deficit present.      Mental Status: He is alert and oriented to person, place, and time.   Psychiatric:         Mood and Affect: Mood normal.         Behavior: Behavior normal.         Thought Content: Thought content normal.         Judgment: Judgment normal.          Assessment and Plan (including Health Maintenance)      Problem List  Smart Sets  " Document Outside HM   :    Plan:         Health Maintenance Due   Topic Date Due    TETANUS VACCINE  Never done    Shingles Vaccine (1 of 2) Never done    RSV Vaccine (Age 60+ and Pregnant patients) (1 - Risk 60-74 years 1-dose series) Never done    Pneumococcal Vaccines (Age 65+) (1 of 1 - PCV) Never done    Abdominal Aortic Aneurysm Screening  Never done    COVID-19 Vaccine (4 - 2024-25 season) 09/01/2024       Problem List Items Addressed This Visit          Psychiatric    Anxiety (Chronic)    Relevant Medications    busPIRone (BUSPAR) 10 MG tablet       Cardiac/Vascular    Hyperlipidemia (Chronic)    Relevant Medications    atorvastatin (LIPITOR) 40 MG tablet    Other Relevant Orders    Comprehensive Metabolic Panel    Lipid Panel    Hypertension - Primary (Chronic)    Relevant Medications    lisinopriL 10 MG tablet    Other Relevant Orders    Comprehensive Metabolic Panel    CBC Auto Differential    Lipid Panel    Ambulatory referral/consult to Sleep Disorders       GI    GERD (gastroesophageal reflux disease) (Chronic)    Relevant Medications    omeprazole (PRILOSEC) 20 MG capsule       Orthopedic    Chronic right shoulder pain (Chronic)    Relevant Medications    cyclobenzaprine (FLEXERIL) 10 MG tablet     Other Visit Diagnoses       Biceps tendinitis of both shoulders        Relevant Medications    cyclobenzaprine (FLEXERIL) 10 MG tablet          Hypertension, unspecified type  -     lisinopriL 10 MG tablet; Take 1 tablet (10 mg total) by mouth once daily.  Dispense: 90 tablet; Refill: 1  -     Comprehensive Metabolic Panel; Future; Expected date: 12/02/2024  -     CBC Auto Differential; Future; Expected date: 12/02/2024  -     Lipid Panel; Future; Expected date: 12/02/2024  -     Ambulatory referral/consult to Sleep Disorders; Future; Expected date: 12/02/2024    Hyperlipidemia, unspecified hyperlipidemia type  -     atorvastatin (LIPITOR) 40 MG tablet; Take 1 tablet (40 mg total) by mouth every evening.   Dispense: 90 tablet; Refill: 1  -     Comprehensive Metabolic Panel; Future; Expected date: 12/02/2024  -     Lipid Panel; Future; Expected date: 12/02/2024    Anxiety  -     busPIRone (BUSPAR) 10 MG tablet; Take 1 tablet (10 mg total) by mouth 2 (two) times daily.  Dispense: 180 tablet; Refill: 1    Gastroesophageal reflux disease, unspecified whether esophagitis present  -     omeprazole (PRILOSEC) 20 MG capsule; Take 1 capsule (20 mg total) by mouth once daily.  Dispense: 90 capsule; Refill: 1    Biceps tendinitis of both shoulders  -     cyclobenzaprine (FLEXERIL) 10 MG tablet; Take 1 tablet (10 mg total) by mouth 2 (two) times daily as needed for Muscle spasms.  Dispense: 30 tablet; Refill: 1    Chronic right shoulder pain  -     cyclobenzaprine (FLEXERIL) 10 MG tablet; Take 1 tablet (10 mg total) by mouth 2 (two) times daily as needed for Muscle spasms.  Dispense: 30 tablet; Refill: 1    Other orders  -     ondansetron (ZOFRAN) 4 MG tablet; Take 1 tablet (4 mg total) by mouth every 8 (eight) hours as needed for Nausea.  Dispense: 30 tablet; Refill: 1       Health Maintenance Topics with due status: Not Due       Topic Last Completion Date    Colorectal Cancer Screening 05/17/2021    PROSTATE-SPECIFIC ANTIGEN 05/22/2024    Lipid Panel 05/22/2024    High Dose Statin 11/12/2024           Future Appointments   Date Time Provider Department Center   5/21/2025  2:00 PM AWV NURSE, [facility] [place]   6/2/2025  1:20 PM Kaya Go DNP, FNP [facility] [place]        Follow up in about 6 months (around 6/2/2025).     Signature:  Kaya Go DNP, FNP  [address] [place] [address]  Phone #: [phone]  Fax #: [phone]    Date of encounter: 12/2/24    Patient Instructions   Continue current medication regimen. Will call pt with lab results. Recommend exercise 30 minutes per day most days of the week. Follow up in 6 months  for chronic medical problems.

## 2024-12-05 DIAGNOSIS — Z13.6 SCREENING FOR AAA (ABDOMINAL AORTIC ANEURYSM): Primary | ICD-10-CM

## 2024-12-05 NOTE — PROGRESS NOTES
Received this message from Jose Johnson:  Hello! This is Anette with Jose Johnson. I spoke to the patient today and we reviewed the care gaps on his chart. He is agreeable to having an Abdominal Aortic Aneurysm Screening. Do you mind to reach out to him to have this scheduled? Thank you for allowing us to be a part of the patient's care team. Please let me know if I could be of any assistance.     Respectfully,   Anette ESTEBAN RN   Care Coordinator/[facility]   [phone] ext [phone]     Discussed with HERVE Snyder and order placed for AAA screening u/s.

## 2024-12-10 ENCOUNTER — HOSPITAL ENCOUNTER (OUTPATIENT)
Dept: RADIOLOGY | Facility: HOSPITAL | Age: 64
Discharge: HOME | End: 2024-12-10
Attending: NURSE PRACTITIONER
Payer: COMMERCIAL

## 2024-12-10 DIAGNOSIS — Z13.6 SCREENING FOR AAA (ABDOMINAL AORTIC ANEURYSM): ICD-10-CM

## 2024-12-10 PROCEDURE — 76706 US ABDL AORTA SCREEN AAA: CPT | Mod: 26,,, | Performed by: RADIOLOGY

## 2024-12-10 PROCEDURE — 76706 US ABDL AORTA SCREEN AAA: CPT | Mod: TC

## 2024-12-31 ENCOUNTER — EXTERNAL CHRONIC CARE MANAGEMENT (OUTPATIENT)
Dept: FAMILY MEDICINE | Facility: CLINIC | Age: 64
End: 2024-12-31
Payer: COMMERCIAL

## 2024-12-31 PROCEDURE — G0511 CCM/BHI BY RHC/FQHC 20MIN MO: HCPCS | Mod: ,,, | Performed by: NURSE PRACTITIONER

## 2025-01-31 ENCOUNTER — EXTERNAL CHRONIC CARE MANAGEMENT (OUTPATIENT)
Dept: FAMILY MEDICINE | Facility: CLINIC | Age: 65
End: 2025-01-31
Payer: COMMERCIAL

## 2025-01-31 PROCEDURE — G0511 CCM/BHI BY RHC/FQHC 20MIN MO: HCPCS | Mod: ,,, | Performed by: NURSE PRACTITIONER

## 2025-02-28 ENCOUNTER — EXTERNAL CHRONIC CARE MANAGEMENT (OUTPATIENT)
Dept: FAMILY MEDICINE | Facility: CLINIC | Age: 65
End: 2025-02-28
Payer: COMMERCIAL

## 2025-02-28 PROCEDURE — G0511 CCM/BHI BY RHC/FQHC 20MIN MO: HCPCS | Mod: ,,, | Performed by: NURSE PRACTITIONER

## 2025-03-31 ENCOUNTER — EXTERNAL CHRONIC CARE MANAGEMENT (OUTPATIENT)
Dept: FAMILY MEDICINE | Facility: CLINIC | Age: 65
End: 2025-03-31
Payer: COMMERCIAL

## 2025-03-31 PROCEDURE — G0511 CCM/BHI BY RHC/FQHC 20MIN MO: HCPCS | Mod: ,,, | Performed by: NURSE PRACTITIONER

## 2025-04-07 RX ORDER — ONDANSETRON 4 MG/1
4 TABLET, FILM COATED ORAL EVERY 8 HOURS PRN
Qty: 30 TABLET | Refills: 0 | OUTPATIENT
Start: 2025-04-07

## 2025-04-07 RX ORDER — ONDANSETRON 4 MG/1
4 TABLET, FILM COATED ORAL EVERY 8 HOURS PRN
Qty: 30 TABLET | Refills: 1 | Status: SHIPPED | OUTPATIENT
Start: 2025-04-07

## 2025-04-07 NOTE — TELEPHONE ENCOUNTER
----- Message from Coty sent at 4/7/2025 10:44 AM CDT -----  VIVIANE YODER [[id number]] is asking for a refill of his ondansetron (ZOFRAN) 4 MG to [facility] in [place]

## 2025-04-30 ENCOUNTER — EXTERNAL CHRONIC CARE MANAGEMENT (OUTPATIENT)
Dept: FAMILY MEDICINE | Facility: CLINIC | Age: 65
End: 2025-04-30
Payer: COMMERCIAL

## 2025-04-30 PROCEDURE — 99490 CHRNC CARE MGMT STAFF 1ST 20: CPT | Mod: ,,, | Performed by: NURSE PRACTITIONER

## 2025-05-21 ENCOUNTER — OFFICE VISIT (OUTPATIENT)
Dept: FAMILY MEDICINE | Facility: CLINIC | Age: 65
End: 2025-05-21
Payer: COMMERCIAL

## 2025-05-21 VITALS
BODY MASS INDEX: 22.95 KG/M2 | WEIGHT: 142.81 LBS | RESPIRATION RATE: 14 BRPM | HEIGHT: 66 IN | SYSTOLIC BLOOD PRESSURE: 120 MMHG | OXYGEN SATURATION: 98 % | HEART RATE: 74 BPM | TEMPERATURE: 98 F | DIASTOLIC BLOOD PRESSURE: 70 MMHG

## 2025-05-21 DIAGNOSIS — Z12.5 SCREENING FOR MALIGNANT NEOPLASM OF PROSTATE: ICD-10-CM

## 2025-05-21 DIAGNOSIS — E78.5 HYPERLIPIDEMIA, UNSPECIFIED HYPERLIPIDEMIA TYPE: ICD-10-CM

## 2025-05-21 DIAGNOSIS — Z00.00 ENCOUNTER FOR SUBSEQUENT ANNUAL WELLNESS VISIT (AWV) IN MEDICARE PATIENT: Primary | ICD-10-CM

## 2025-05-21 DIAGNOSIS — M75.21 BICEPS TENDINITIS OF BOTH SHOULDERS: ICD-10-CM

## 2025-05-21 DIAGNOSIS — M25.511 CHRONIC RIGHT SHOULDER PAIN: ICD-10-CM

## 2025-05-21 DIAGNOSIS — K21.9 GASTROESOPHAGEAL REFLUX DISEASE, UNSPECIFIED WHETHER ESOPHAGITIS PRESENT: ICD-10-CM

## 2025-05-21 DIAGNOSIS — Z13.31 POSITIVE DEPRESSION SCREENING: ICD-10-CM

## 2025-05-21 DIAGNOSIS — G89.29 CHRONIC RIGHT SHOULDER PAIN: ICD-10-CM

## 2025-05-21 DIAGNOSIS — M75.22 BICEPS TENDINITIS OF BOTH SHOULDERS: ICD-10-CM

## 2025-05-21 DIAGNOSIS — I10 HYPERTENSION, UNSPECIFIED TYPE: ICD-10-CM

## 2025-05-21 DIAGNOSIS — F41.9 ANXIETY: ICD-10-CM

## 2025-05-21 LAB
ALBUMIN SERPL BCP-MCNC: 4.3 G/DL (ref 3.4–4.8)
ALBUMIN/GLOB SERPL: 1 {RATIO}
ALP SERPL-CCNC: 99 U/L (ref 40–150)
ALT SERPL W P-5'-P-CCNC: 35 U/L
ANION GAP SERPL CALCULATED.3IONS-SCNC: 15 MMOL/L (ref 7–16)
AST SERPL W P-5'-P-CCNC: 32 U/L (ref 11–45)
BASOPHILS # BLD AUTO: 0.06 K/UL (ref 0–0.2)
BASOPHILS NFR BLD AUTO: 0.6 % (ref 0–1)
BILIRUB SERPL-MCNC: 0.6 MG/DL
BILIRUB SERPL-MCNC: ABNORMAL MG/DL
BLOOD URINE, POC: ABNORMAL
BUN SERPL-MCNC: 18 MG/DL (ref 8–26)
BUN/CREAT SERPL: 18 (ref 6–20)
CALCIUM SERPL-MCNC: 9.2 MG/DL (ref 8.8–10)
CHLORIDE SERPL-SCNC: 104 MMOL/L (ref 98–107)
CHOLEST SERPL-MCNC: 168 MG/DL
CHOLEST/HDLC SERPL: 4.4 {RATIO}
CLARITY, UA: CLEAR
CO2 SERPL-SCNC: 24 MMOL/L (ref 23–31)
COLOR, UA: YELLOW
CREAT SERPL-MCNC: 1.01 MG/DL (ref 0.72–1.25)
CREAT UR-MCNC: 162 MG/DL (ref 23–375)
DIFFERENTIAL METHOD BLD: ABNORMAL
EGFR (NO RACE VARIABLE) (RUSH/TITUS): 82 ML/MIN/1.73M2
EOSINOPHIL # BLD AUTO: 0.26 K/UL (ref 0–0.5)
EOSINOPHIL NFR BLD AUTO: 2.6 % (ref 1–4)
ERYTHROCYTE [DISTWIDTH] IN BLOOD BY AUTOMATED COUNT: 12.3 % (ref 11.5–14.5)
GLOBULIN SER-MCNC: 4.2 G/DL (ref 2–4)
GLUCOSE SERPL-MCNC: 85 MG/DL (ref 82–115)
GLUCOSE UR QL STRIP: ABNORMAL
HCT VFR BLD AUTO: 47.2 % (ref 40–54)
HDLC SERPL-MCNC: 38 MG/DL (ref 35–60)
HGB BLD-MCNC: 14.3 G/DL (ref 13.5–18)
IMM GRANULOCYTES # BLD AUTO: 0.05 K/UL (ref 0–0.04)
IMM GRANULOCYTES NFR BLD: 0.5 % (ref 0–0.4)
KETONES UR QL STRIP: ABNORMAL
LDLC SERPL CALC-MCNC: 109 MG/DL
LDLC/HDLC SERPL: 2.9 {RATIO}
LEUKOCYTE ESTERASE URINE, POC: ABNORMAL
LYMPHOCYTES # BLD AUTO: 1.76 K/UL (ref 1–4.8)
LYMPHOCYTES NFR BLD AUTO: 17.7 % (ref 27–41)
MCH RBC QN AUTO: 26.4 PG (ref 27–31)
MCHC RBC AUTO-ENTMCNC: 30.3 G/DL (ref 32–36)
MCV RBC AUTO: 87.2 FL (ref 80–96)
MICROALBUMIN UR-MCNC: 2.3 MG/DL
MICROALBUMIN/CREAT RATIO PNL UR: 14.2 MG/G (ref 0–30)
MONOCYTES # BLD AUTO: 0.8 K/UL (ref 0–0.8)
MONOCYTES NFR BLD AUTO: 8 % (ref 2–6)
MPC BLD CALC-MCNC: 10.5 FL (ref 9.4–12.4)
NEUTROPHILS # BLD AUTO: 7.01 K/UL (ref 1.8–7.7)
NEUTROPHILS NFR BLD AUTO: 70.6 % (ref 53–65)
NITRITE, POC UA: ABNORMAL
NONHDLC SERPL-MCNC: 130 MG/DL
NRBC # BLD AUTO: 0 X10E3/UL
NRBC, AUTO (.00): 0 %
PH, POC UA: 5.5
PLATELET # BLD AUTO: 269 K/UL (ref 150–400)
POTASSIUM SERPL-SCNC: 4.4 MMOL/L (ref 3.5–5.1)
PROT SERPL-MCNC: 8.5 G/DL (ref 5.8–7.6)
PROTEIN, POC: ABNORMAL
PSA SERPL-MCNC: 0.78 NG/ML
RBC # BLD AUTO: 5.41 M/UL (ref 4.6–6.2)
SODIUM SERPL-SCNC: 139 MMOL/L (ref 136–145)
SPECIFIC GRAVITY, POC UA: 1.02
TRIGL SERPL-MCNC: 106 MG/DL (ref 34–140)
UROBILINOGEN, POC UA: 1
VLDLC SERPL-MCNC: 21 MG/DL
WBC # BLD AUTO: 9.94 K/UL (ref 4.5–11)

## 2025-05-21 PROCEDURE — 3078F DIAST BP <80 MM HG: CPT | Mod: ,,, | Performed by: NURSE PRACTITIONER

## 2025-05-21 PROCEDURE — 1170F FXNL STATUS ASSESSED: CPT | Mod: ,,, | Performed by: NURSE PRACTITIONER

## 2025-05-21 PROCEDURE — 80053 COMPREHEN METABOLIC PANEL: CPT | Mod: ,,, | Performed by: CLINICAL MEDICAL LABORATORY

## 2025-05-21 PROCEDURE — 1101F PT FALLS ASSESS-DOCD LE1/YR: CPT | Mod: ,,, | Performed by: NURSE PRACTITIONER

## 2025-05-21 PROCEDURE — 4010F ACE/ARB THERAPY RXD/TAKEN: CPT | Mod: ,,, | Performed by: NURSE PRACTITIONER

## 2025-05-21 PROCEDURE — 1160F RVW MEDS BY RX/DR IN RCRD: CPT | Mod: ,,, | Performed by: NURSE PRACTITIONER

## 2025-05-21 PROCEDURE — 80061 LIPID PANEL: CPT | Mod: ,,, | Performed by: CLINICAL MEDICAL LABORATORY

## 2025-05-21 PROCEDURE — G0103 PSA SCREENING: HCPCS | Mod: ,,, | Performed by: CLINICAL MEDICAL LABORATORY

## 2025-05-21 PROCEDURE — 1158F ADVNC CARE PLAN TLK DOCD: CPT | Mod: ,,, | Performed by: NURSE PRACTITIONER

## 2025-05-21 PROCEDURE — 1159F MED LIST DOCD IN RCRD: CPT | Mod: ,,, | Performed by: NURSE PRACTITIONER

## 2025-05-21 PROCEDURE — 1125F AMNT PAIN NOTED PAIN PRSNT: CPT | Mod: ,,, | Performed by: NURSE PRACTITIONER

## 2025-05-21 PROCEDURE — 85025 COMPLETE CBC W/AUTO DIFF WBC: CPT | Mod: ,,, | Performed by: CLINICAL MEDICAL LABORATORY

## 2025-05-21 PROCEDURE — 82043 UR ALBUMIN QUANTITATIVE: CPT | Mod: ,,, | Performed by: CLINICAL MEDICAL LABORATORY

## 2025-05-21 PROCEDURE — 82570 ASSAY OF URINE CREATININE: CPT | Mod: ,,, | Performed by: CLINICAL MEDICAL LABORATORY

## 2025-05-21 PROCEDURE — 3074F SYST BP LT 130 MM HG: CPT | Mod: ,,, | Performed by: NURSE PRACTITIONER

## 2025-05-21 PROCEDURE — 3288F FALL RISK ASSESSMENT DOCD: CPT | Mod: ,,, | Performed by: NURSE PRACTITIONER

## 2025-05-21 PROCEDURE — G0439 PPPS, SUBSEQ VISIT: HCPCS | Mod: ,,, | Performed by: NURSE PRACTITIONER

## 2025-05-21 RX ORDER — CYCLOBENZAPRINE HCL 10 MG
10 TABLET ORAL 2 TIMES DAILY PRN
Qty: 30 TABLET | Refills: 1 | Status: SHIPPED | OUTPATIENT
Start: 2025-05-21

## 2025-05-21 RX ORDER — OMEPRAZOLE 20 MG/1
20 CAPSULE, DELAYED RELEASE ORAL DAILY
Qty: 90 CAPSULE | Refills: 1 | Status: SHIPPED | OUTPATIENT
Start: 2025-05-21

## 2025-05-21 RX ORDER — LISINOPRIL 10 MG/1
10 TABLET ORAL DAILY
Qty: 90 TABLET | Refills: 1 | Status: SHIPPED | OUTPATIENT
Start: 2025-05-21

## 2025-05-21 RX ORDER — ATORVASTATIN CALCIUM 40 MG/1
40 TABLET, FILM COATED ORAL NIGHTLY
Qty: 90 TABLET | Refills: 1 | Status: SHIPPED | OUTPATIENT
Start: 2025-05-21

## 2025-05-21 RX ORDER — BUSPIRONE HYDROCHLORIDE 10 MG/1
10 TABLET ORAL 2 TIMES DAILY
Qty: 180 TABLET | Refills: 1 | Status: SHIPPED | OUTPATIENT
Start: 2025-05-21

## 2025-05-21 NOTE — PROGRESS NOTES
"  Amador Callahan presented for a follow-up Medicare AWV today. The following components were reviewed and updated:    Medical history  Family History  Social history  Allergies and Current Medications  Health Risk Assessment  Health Maintenance  Care Team    **See Completed Assessments for Annual Wellness visit with in the encounter summary    The following assessments were completed:  Depression Screening  Cognitive function Screening  Timed Get Up Test  Whisper Test      Opioid documentation:      Patient does not have a current opioid prescription.          Vitals:    05/21/25 1410   BP: 120/70   Pulse: 74   Resp: 14   Temp: 97.9 °F (36.6 °C)   SpO2: 98%   Weight: 64.8 kg (142 lb 12.8 oz)   Height: 5' 6" (1.676 m)     Body mass index is 23.05 kg/m².       Physical Exam  Vitals and nursing note reviewed.   HENT:      Head: Normocephalic.      Nose: Nose normal.      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      Pupils: Pupils are equal, round, and reactive to light.   Cardiovascular:      Rate and Rhythm: Normal rate and regular rhythm.      Pulses: Normal pulses.      Heart sounds: Normal heart sounds.   Pulmonary:      Effort: Pulmonary effort is normal.      Breath sounds: Normal breath sounds.   Abdominal:      General: Bowel sounds are normal.   Musculoskeletal:      Cervical back: Normal range of motion and neck supple.   Skin:     General: Skin is warm and dry.   Neurological:      Mental Status: He is alert and oriented to person, place, and time.           Diagnoses and health risks identified today and associated recommendations/orders:  1. Encounter for subsequent annual wellness visit (AWV) in Medicare patient  Follow up in 1 year for AWV.   2. Hyperlipidemia, unspecified hyperlipidemia type    - Comprehensive Metabolic Panel; Future  - Lipid Panel; Future  - atorvastatin (LIPITOR) 40 MG tablet; Take 1 tablet (40 mg total) by " mouth every evening.  Dispense: 90 tablet; Refill: 1  - Comprehensive Metabolic Panel  - Lipid Panel  Low fat diet  3. Hypertension, unspecified type    - Comprehensive Metabolic Panel; Future  - CBC Auto Differential; Future  - Lipid Panel; Future  - Microalbumin/Creatinine Ratio, Urine  - POCT URINALYSIS W/O SCOPE  - lisinopriL 10 MG tablet; Take 1 tablet (10 mg total) by mouth once daily.  Dispense: 90 tablet; Refill: 1  - Comprehensive Metabolic Panel  - CBC Auto Differential  - Lipid Panel  Low salt diet  4. Gastroesophageal reflux disease, unspecified whether esophagitis present    - omeprazole (PRILOSEC) 20 MG capsule; Take 1 capsule (20 mg total) by mouth once daily.  Dispense: 90 capsule; Refill: 1    5. Anxiety    - busPIRone (BUSPAR) 10 MG tablet; Take 1 tablet (10 mg total) by mouth 2 (two) times daily.  Dispense: 180 tablet; Refill: 1    6. Screening for malignant neoplasm of prostate    - PSA, Screening; Future  - PSA, Screening    7. BMI 23.0-23.9, adult      8. Biceps tendinitis of both shoulders    - cyclobenzaprine (FLEXERIL) 10 MG tablet; Take 1 tablet (10 mg total) by mouth 2 (two) times daily as needed for Muscle spasms.  Dispense: 30 tablet; Refill: 1    9. Chronic right shoulder pain    - cyclobenzaprine (FLEXERIL) 10 MG tablet; Take 1 tablet (10 mg total) by mouth 2 (two) times daily as needed for Muscle spasms.  Dispense: 30 tablet; Refill: 1    10. Positive depression screening    Buspar 10 mg 2 times a day.     Provided Amador with a 5-10 year written screening schedule and personal prevention plan. Recommendations were developed using the USPSTF age appropriate recommendations. Education, counseling, and referrals were provided as needed.  After Visit Summary printed and given to patient which includes a list of additional screenings\tests needed.  Health Maintenance Due   Topic Date Due    Shingles Vaccine (1 of 2) Never done    Pneumococcal Vaccines (Age 50+) (1 of 1 - PCV) Never done     RSV Vaccine (Age 60+ and Pregnant patients) (1 - Risk 60-74 years 1-dose series) Never done    COVID-19 Vaccine (4 - 2024-25 season) 09/01/2024    TETANUS VACCINE  11/18/2024      Declined all the vaccines not taken.  Kaya  ordered PSA, CMP, CBC, Lipid, Micro urine, POCT w/o scope and PSA.  Refilled Medication  Follow up for 1 year for Annual Wellness Visit.      Kaya Go, DNP, FNP      I offered to discuss advanced care planning, including how to pick a person who would make decisions for you if you were unable to make them for yourself, called a health care power of , and what kind of decisions you might make such as use of life sustaining treatments such as ventilators and tube feeding when faced with a life limiting illness recorded on a living will that they will need to know. (How you want to be cared for as you near the end of your natural life)     X Patient is interested in learning more about how to make advanced directives.  I provided them paperwork and offered to discuss this with them.

## 2025-05-21 NOTE — PATIENT INSTRUCTIONS
Counseling and Referral of Other Preventative  (Italic type indicates deductible and co-insurance are waived)    Patient Name: Amador Callahan  Today's Date: 5/21/2025    Health Maintenance       Date Due Completion Date    Shingles Vaccine (1 of 2) Never done ---    Pneumococcal Vaccines (Age 50+) (1 of 1 - PCV) Never done ---    RSV Vaccine (Age 60+ and Pregnant patients) (1 - Risk 60-74 years 1-dose series) Never done ---    COVID-19 Vaccine (4 - 2024-25 season) 09/01/2024 10/6/2021    TETANUS VACCINE 11/18/2024 11/18/2014    PROSTATE-SPECIFIC ANTIGEN 05/22/2025 5/22/2024    High Dose Statin 12/02/2025 12/2/2024    Lipid Panel 12/02/2025 12/2/2024    Colorectal Cancer Screening 05/17/2026 5/17/2021        Orders Placed This Encounter   Procedures    Comprehensive Metabolic Panel    CBC Auto Differential    Lipid Panel    Microalbumin/Creatinine Ratio, Urine    PSA, Screening       The following information is provided to all patients.  This information is to help you find resources for any of the problems found today that may be affecting your health:                  Living healthy guide: [place].gov    Understanding Diabetes: www.diabetes.org      Eating healthy: www.cdc.gov/healthyweight      CDC home safety checklist: www.cdc.gov/steadi/patient.html      Agency on Aging: [place].gov    Alcoholics anonymous (AA): www.aa.org      Physical Activity: www.vero.nih.gov/be4cpci      Tobacco use: [place].gov

## 2025-05-21 NOTE — PROGRESS NOTES
I have used clinical judgement based on duration and functional status to consider definite necessity for treatment.

## 2025-05-27 ENCOUNTER — RESULTS FOLLOW-UP (OUTPATIENT)
Dept: FAMILY MEDICINE | Facility: CLINIC | Age: 65
End: 2025-05-27

## 2025-05-31 ENCOUNTER — EXTERNAL CHRONIC CARE MANAGEMENT (OUTPATIENT)
Dept: FAMILY MEDICINE | Facility: CLINIC | Age: 65
End: 2025-05-31
Payer: COMMERCIAL

## 2025-05-31 PROCEDURE — 99490 CHRNC CARE MGMT STAFF 1ST 20: CPT | Mod: ,,, | Performed by: NURSE PRACTITIONER

## 2025-06-16 ENCOUNTER — OFFICE VISIT (OUTPATIENT)
Dept: FAMILY MEDICINE | Facility: CLINIC | Age: 65
End: 2025-06-16
Payer: COMMERCIAL

## 2025-06-16 VITALS
HEART RATE: 81 BPM | TEMPERATURE: 98 F | HEIGHT: 66 IN | SYSTOLIC BLOOD PRESSURE: 106 MMHG | OXYGEN SATURATION: 97 % | RESPIRATION RATE: 18 BRPM | BODY MASS INDEX: 22.5 KG/M2 | DIASTOLIC BLOOD PRESSURE: 65 MMHG | WEIGHT: 140 LBS

## 2025-06-16 DIAGNOSIS — M62.838 MUSCLE SPASM: Primary | ICD-10-CM

## 2025-06-16 DIAGNOSIS — K21.9 GASTROESOPHAGEAL REFLUX DISEASE, UNSPECIFIED WHETHER ESOPHAGITIS PRESENT: ICD-10-CM

## 2025-06-16 PROCEDURE — 1160F RVW MEDS BY RX/DR IN RCRD: CPT | Mod: ,,, | Performed by: NURSE PRACTITIONER

## 2025-06-16 PROCEDURE — 3288F FALL RISK ASSESSMENT DOCD: CPT | Mod: ,,, | Performed by: NURSE PRACTITIONER

## 2025-06-16 PROCEDURE — 3066F NEPHROPATHY DOC TX: CPT | Mod: ,,, | Performed by: NURSE PRACTITIONER

## 2025-06-16 PROCEDURE — 3008F BODY MASS INDEX DOCD: CPT | Mod: ,,, | Performed by: NURSE PRACTITIONER

## 2025-06-16 PROCEDURE — 99214 OFFICE O/P EST MOD 30 MIN: CPT | Mod: ,,, | Performed by: NURSE PRACTITIONER

## 2025-06-16 PROCEDURE — 4010F ACE/ARB THERAPY RXD/TAKEN: CPT | Mod: ,,, | Performed by: NURSE PRACTITIONER

## 2025-06-16 PROCEDURE — 3074F SYST BP LT 130 MM HG: CPT | Mod: ,,, | Performed by: NURSE PRACTITIONER

## 2025-06-16 PROCEDURE — 3078F DIAST BP <80 MM HG: CPT | Mod: ,,, | Performed by: NURSE PRACTITIONER

## 2025-06-16 PROCEDURE — 1159F MED LIST DOCD IN RCRD: CPT | Mod: ,,, | Performed by: NURSE PRACTITIONER

## 2025-06-16 PROCEDURE — 1126F AMNT PAIN NOTED NONE PRSNT: CPT | Mod: ,,, | Performed by: NURSE PRACTITIONER

## 2025-06-16 PROCEDURE — 1101F PT FALLS ASSESS-DOCD LE1/YR: CPT | Mod: ,,, | Performed by: NURSE PRACTITIONER

## 2025-06-16 PROCEDURE — 3061F NEG MICROALBUMINURIA REV: CPT | Mod: ,,, | Performed by: NURSE PRACTITIONER

## 2025-06-16 RX ORDER — TIZANIDINE 2 MG/1
2 TABLET ORAL EVERY 6 HOURS PRN
Qty: 40 TABLET | Refills: 2 | Status: SHIPPED | OUTPATIENT
Start: 2025-06-16 | End: 2025-06-16

## 2025-06-16 RX ORDER — OMEPRAZOLE 40 MG/1
40 CAPSULE, DELAYED RELEASE ORAL DAILY
Qty: 90 CAPSULE | Refills: 1 | Status: SHIPPED | OUTPATIENT
Start: 2025-06-16 | End: 2026-06-16

## 2025-06-16 RX ORDER — TIZANIDINE 2 MG/1
2 TABLET ORAL EVERY 6 HOURS PRN
Qty: 40 TABLET | Refills: 2 | Status: SHIPPED | OUTPATIENT
Start: 2025-06-16

## 2025-06-16 NOTE — PATIENT INSTRUCTIONS
Continue current medication regimen. Recommend exercise 30 minutes per day most days of the week. Follow up in 6 months for chronic medical problems.

## 2025-06-16 NOTE — PROGRESS NOTES
Kaya Go DNP, FNP    [address] [place] [address]     PATIENT NAME: Amador Callahan  : 1960  DATE: 25  MRN: [id number]      Billing Provider: Kaya Go DNP, FNP  Level of Service:   Patient PCP Information       Provider PCP Type    Kaya Go DNP, FNP General            Reason for Visit / Chief Complaint: Nausea and Neck Pain (Wanting a different muscle relaxer due to insurance denying payment on Flexeril)       Update PCP  Update Chief Complaint         History of Present Illness / Problem Focused Workflow     Amador Callahan presents to the clinic with Nausea and Neck Pain (Wanting a different muscle relaxer due to insurance denying payment on Flexeril)         Review of Systems     Review of Systems   Constitutional:  Negative for activity change and appetite change.   HENT:  Negative for dental problem, ear pain, sinus pressure/congestion and sore throat.    Respiratory:  Negative for cough, chest tightness and shortness of breath.    Cardiovascular:  Negative for chest pain and palpitations.   Gastrointestinal:  Positive for nausea and reflux. Negative for abdominal pain.   Genitourinary:  Negative for bladder incontinence and dysuria.   Musculoskeletal:  Positive for myalgias and neck pain. Negative for arthralgias.   Integumentary:  Negative for rash.   Neurological:  Negative for dizziness, weakness and numbness.        Medical / Social / Family History     Past Medical History:   Diagnosis Date    Adenomatous polyp of descending colon 2021    Anxiety     Diverticula, colon 2021    GERD (gastroesophageal reflux disease)     History of cerebrovascular accident     Hyperlipidemia     Hypertension     Screening for malignant neoplasm of colon 2021       Past Surgical History:   Procedure Laterality Date    EPIDURAL STEROID INJECTION INTO CERVICAL SPINE N/A 2022    Procedure: Injection-steroid-epidural-cervical,  "C4/5 EDE;  Surgeon: Angelic Hdz MD;  Location: [facility];  Service: Pain Management;  Laterality: N/A;    EXTERNAL EAR SURGERY Left     heart cath      MASTOID SURGERY Left 12/15/2023    middle ear and mastoid surgery       Social History  Mr. Amador Callahan  reports that he quit smoking about 45 years ago. His smoking use included cigarettes. He started smoking about 47 years ago. He has a 2 pack-year smoking history. He has been exposed to tobacco smoke. He has never used smokeless tobacco. He reports that he does not drink alcohol and does not use drugs.    Family History  Mr. Amador Callahan's family history includes Cancer in his father and sister; Heart disease in his mother.    Medications and Allergies     Medications  No outpatient medications have been marked as taking for the 6/16/25 encounter (Office Visit) with Kaya Go, VERONIKA, FNP.       Allergies  Review of patient's allergies indicates:  No Known Allergies    Physical Examination     Vitals:    06/16/25 1021   BP: 106/65   BP Location: Left arm   Patient Position: Sitting   Pulse: 81   Resp: 18   Temp: 98 °F (36.7 °C)   TempSrc: Oral   SpO2: 97%   Weight: 63.5 kg (140 lb)   Height: 5' 6" (1.676 m)     Physical Exam  Vitals and nursing note reviewed.   Constitutional:       General: He is not in acute distress.     Appearance: Normal appearance. He is normal weight.   HENT:      Mouth/Throat:      Mouth: Mucous membranes are moist.      Comments: Mouth drawn--chronic  Eyes:      Pupils: Pupils are equal, round, and reactive to light.   Cardiovascular:      Rate and Rhythm: Normal rate and regular rhythm.      Pulses: Normal pulses.      Heart sounds: No murmur heard.  Pulmonary:      Effort: Pulmonary effort is normal. No respiratory distress.      Breath sounds: Normal breath sounds. No wheezing, rhonchi or rales.   Chest:      Chest wall: No tenderness.   Abdominal:      General: Bowel sounds are normal. There is no distension. "      Palpations: Abdomen is soft.      Tenderness: There is no abdominal tenderness. There is no right CVA tenderness, left CVA tenderness, guarding or rebound.   Musculoskeletal:         General: No swelling. Normal range of motion.      Cervical back: Normal range of motion and neck supple. Tenderness present.      Right lower leg: No edema.      Left lower leg: No edema.   Skin:     General: Skin is warm and dry.   Neurological:      General: No focal deficit present.      Mental Status: He is alert and oriented to person, place, and time.   Psychiatric:         Mood and Affect: Mood normal.         Behavior: Behavior normal.         Thought Content: Thought content normal.         Judgment: Judgment normal.          Assessment and Plan (including Health Maintenance)      Problem List  [employer]  Document Outside HM   :    Plan:         Health Maintenance Due   Topic Date Due    Shingles Vaccine (1 of 2) Never done    Pneumococcal Vaccines (Age 50+) (1 of 1 - PCV) Never done    RSV Vaccine (Age 60+ and Pregnant patients) (1 - Risk 60-74 years 1-dose series) Never done    COVID-19 Vaccine (4 - 2024-25 season) 09/01/2024    TETANUS VACCINE  11/18/2024       Problem List Items Addressed This Visit          GI    GERD (gastroesophageal reflux disease) (Chronic)    Relevant Medications    omeprazole (PRILOSEC) 40 MG capsule     Other Visit Diagnoses         Muscle spasm    -  Primary    Relevant Medications    tiZANidine (ZANAFLEX) 2 MG tablet          Muscle spasm  -     Discontinue: tiZANidine (ZANAFLEX) 2 MG tablet; Take 1 tablet (2 mg total) by mouth every 6 (six) hours as needed (muscle spasms).  Dispense: 40 tablet; Refill: 2  -     tiZANidine (ZANAFLEX) 2 MG tablet; Take 1 tablet (2 mg total) by mouth every 6 (six) hours as needed (muscle spasms).  Dispense: 40 tablet; Refill: 2    Gastroesophageal reflux disease, unspecified whether esophagitis present  -     omeprazole (PRILOSEC) 40 MG capsule; Take 1  capsule (40 mg total) by mouth once daily.  Dispense: 90 capsule; Refill: 1       Health Maintenance Topics with due status: Not Due       Topic Last Completion Date    Colorectal Cancer Screening 05/17/2021    PROSTATE-SPECIFIC ANTIGEN 05/21/2025    High Dose Statin 05/21/2025    Lipid Panel 05/21/2025           Future Appointments   Date Time Provider Department Center   11/20/2025  1:40 PM Kaya Go DNP, FNP [facility]   5/20/2026  3:00 PM AWV NURSE, [facility]        Follow up if symptoms worsen or fail to improve.     Signature:  Kaya Go DNP, FNP  [address] [place] [address]  Phone #: [phone]  Fax #: [phone]    Date of encounter: 6/16/25    Patient Instructions   Continue current medication regimen. Recommend exercise 30 minutes per day most days of the week. Follow up in 6 months for chronic medical problems.

## 2025-06-30 ENCOUNTER — EXTERNAL CHRONIC CARE MANAGEMENT (OUTPATIENT)
Dept: FAMILY MEDICINE | Facility: CLINIC | Age: 65
End: 2025-06-30
Payer: COMMERCIAL

## 2025-06-30 PROCEDURE — 99490 CHRNC CARE MGMT STAFF 1ST 20: CPT | Mod: ,,, | Performed by: NURSE PRACTITIONER

## 2025-07-31 ENCOUNTER — EXTERNAL CHRONIC CARE MANAGEMENT (OUTPATIENT)
Dept: FAMILY MEDICINE | Facility: CLINIC | Age: 65
End: 2025-07-31
Payer: COMMERCIAL

## 2025-07-31 PROCEDURE — 99490 CHRNC CARE MGMT STAFF 1ST 20: CPT | Mod: ,,, | Performed by: NURSE PRACTITIONER

## (undated) DEVICE — TRAY EPIDURAL SINGLE SHOT PMA

## (undated) DEVICE — SOL CONTINU-FLO SET 2 LAV

## (undated) DEVICE — GLOVE PROTEXIS PI SYN SURG 6.5

## (undated) DEVICE — SET EXTENSION CLEARLINK 2INJ

## (undated) DEVICE — CATH IV 22G X 1 AUTOGUARD

## (undated) DEVICE — KIT IV START RUSH

## (undated) DEVICE — APPLICATOR CHLORAPREP ORN 26ML